# Patient Record
Sex: FEMALE | Race: WHITE | Employment: OTHER | URBAN - METROPOLITAN AREA
[De-identification: names, ages, dates, MRNs, and addresses within clinical notes are randomized per-mention and may not be internally consistent; named-entity substitution may affect disease eponyms.]

---

## 2018-07-18 ENCOUNTER — TELEPHONE (OUTPATIENT)
Dept: NON INVASIVE DIAGNOSTICS | Age: 83
End: 2018-07-18

## 2018-08-14 NOTE — TELEPHONE ENCOUNTER
Patient lives in [place] now and per her Rose Omer, she is getting device follow up in [place] and will not be coming back to this office.

## 2018-09-25 ENCOUNTER — HOSPITAL ENCOUNTER (INPATIENT)
Age: 83
LOS: 14 days | Discharge: NURSING, REHAB OR LONG-TERM CARE | DRG: 330 | End: 2018-10-09
Attending: EMERGENCY MEDICINE | Admitting: HOSPITALIST
Payer: MEDICARE

## 2018-09-25 ENCOUNTER — APPOINTMENT (OUTPATIENT)
Dept: CT IMAGING | Age: 83
DRG: 330 | End: 2018-09-25
Payer: MEDICARE

## 2018-09-25 DIAGNOSIS — Z79.01 CHRONIC ANTICOAGULATION: ICD-10-CM

## 2018-09-25 DIAGNOSIS — K92.2 GASTROINTESTINAL HEMORRHAGE, UNSPECIFIED GASTROINTESTINAL HEMORRHAGE TYPE: ICD-10-CM

## 2018-09-25 DIAGNOSIS — K63.89 COLONIC MASS: ICD-10-CM

## 2018-09-25 DIAGNOSIS — R10.84 GENERALIZED ABDOMINAL PAIN: ICD-10-CM

## 2018-09-25 DIAGNOSIS — R07.1 CHEST PAIN ON BREATHING: ICD-10-CM

## 2018-09-25 DIAGNOSIS — R79.1 SUPRATHERAPEUTIC INR: ICD-10-CM

## 2018-09-25 DIAGNOSIS — K50.011 TERMINAL ILEITIS WITH RECTAL BLEEDING (HCC): Primary | ICD-10-CM

## 2018-09-25 DIAGNOSIS — Z86.79 HISTORY OF ATRIAL FIBRILLATION: ICD-10-CM

## 2018-09-25 LAB
ALBUMIN SERPL-MCNC: 3.8 G/DL (ref 3.5–5.2)
ALP BLD-CCNC: 96 U/L (ref 35–104)
ALT SERPL-CCNC: 12 U/L (ref 0–32)
ANION GAP SERPL CALCULATED.3IONS-SCNC: 15 MMOL/L (ref 7–16)
AST SERPL-CCNC: 17 U/L (ref 0–31)
BASOPHILS ABSOLUTE: 0.04 E9/L (ref 0–0.2)
BASOPHILS RELATIVE PERCENT: 0.6 % (ref 0–2)
BILIRUB SERPL-MCNC: 0.4 MG/DL (ref 0–1.2)
BUN BLDV-MCNC: 25 MG/DL (ref 8–23)
CALCIUM SERPL-MCNC: 9.3 MG/DL (ref 8.6–10.2)
CHLORIDE BLD-SCNC: 104 MMOL/L (ref 98–107)
CO2: 20 MMOL/L (ref 22–29)
CREAT SERPL-MCNC: 0.8 MG/DL (ref 0.5–1)
EKG ATRIAL RATE: 144 BPM
EKG Q-T INTERVAL: 372 MS
EKG QRS DURATION: 98 MS
EKG QTC CALCULATION (BAZETT): 429 MS
EKG R AXIS: 130 DEGREES
EKG T AXIS: 17 DEGREES
EKG VENTRICULAR RATE: 80 BPM
EOSINOPHILS ABSOLUTE: 0 E9/L (ref 0.05–0.5)
EOSINOPHILS RELATIVE PERCENT: 0 % (ref 0–6)
GFR AFRICAN AMERICAN: >60
GFR NON-AFRICAN AMERICAN: >60 ML/MIN/1.73
GLUCOSE BLD-MCNC: 160 MG/DL (ref 74–109)
HCT VFR BLD CALC: 33.4 % (ref 34–48)
HEMOGLOBIN: 10.5 G/DL (ref 11.5–15.5)
IMMATURE GRANULOCYTES #: 0.02 E9/L
IMMATURE GRANULOCYTES %: 0.3 % (ref 0–5)
INR BLD: 5.3
LACTIC ACID: 1.2 MMOL/L (ref 0.5–2.2)
LIPASE: 11 U/L (ref 13–60)
LYMPHOCYTES ABSOLUTE: 1.09 E9/L (ref 1.5–4)
LYMPHOCYTES RELATIVE PERCENT: 15.9 % (ref 20–42)
MCH RBC QN AUTO: 30.3 PG (ref 26–35)
MCHC RBC AUTO-ENTMCNC: 31.4 % (ref 32–34.5)
MCV RBC AUTO: 96.5 FL (ref 80–99.9)
MONOCYTES ABSOLUTE: 1.3 E9/L (ref 0.1–0.95)
MONOCYTES RELATIVE PERCENT: 19 % (ref 2–12)
NEUTROPHILS ABSOLUTE: 4.4 E9/L (ref 1.8–7.3)
NEUTROPHILS RELATIVE PERCENT: 64.2 % (ref 43–80)
PDW BLD-RTO: 15 FL (ref 11.5–15)
PLATELET # BLD: 249 E9/L (ref 130–450)
PMV BLD AUTO: 10.2 FL (ref 7–12)
POTASSIUM SERPL-SCNC: 4.3 MMOL/L (ref 3.5–5)
PROTHROMBIN TIME: 59.6 SEC (ref 9.3–12.4)
RBC # BLD: 3.46 E12/L (ref 3.5–5.5)
SODIUM BLD-SCNC: 139 MMOL/L (ref 132–146)
TOTAL PROTEIN: 7.3 G/DL (ref 6.4–8.3)
WBC # BLD: 6.9 E9/L (ref 4.5–11.5)

## 2018-09-25 PROCEDURE — 6370000000 HC RX 637 (ALT 250 FOR IP): Performed by: EMERGENCY MEDICINE

## 2018-09-25 PROCEDURE — 80053 COMPREHEN METABOLIC PANEL: CPT

## 2018-09-25 PROCEDURE — 74176 CT ABD & PELVIS W/O CONTRAST: CPT

## 2018-09-25 PROCEDURE — 96374 THER/PROPH/DIAG INJ IV PUSH: CPT

## 2018-09-25 PROCEDURE — 2580000003 HC RX 258: Performed by: EMERGENCY MEDICINE

## 2018-09-25 PROCEDURE — 99285 EMERGENCY DEPT VISIT HI MDM: CPT

## 2018-09-25 PROCEDURE — 6360000002 HC RX W HCPCS

## 2018-09-25 PROCEDURE — 96375 TX/PRO/DX INJ NEW DRUG ADDON: CPT

## 2018-09-25 PROCEDURE — 83690 ASSAY OF LIPASE: CPT

## 2018-09-25 PROCEDURE — 85610 PROTHROMBIN TIME: CPT

## 2018-09-25 PROCEDURE — 85025 COMPLETE CBC W/AUTO DIFF WBC: CPT

## 2018-09-25 PROCEDURE — 2060000000 HC ICU INTERMEDIATE R&B

## 2018-09-25 PROCEDURE — 83605 ASSAY OF LACTIC ACID: CPT

## 2018-09-25 PROCEDURE — 6370000000 HC RX 637 (ALT 250 FOR IP): Performed by: HOSPITALIST

## 2018-09-25 PROCEDURE — 2580000003 HC RX 258: Performed by: HOSPITALIST

## 2018-09-25 PROCEDURE — 93005 ELECTROCARDIOGRAM TRACING: CPT | Performed by: EMERGENCY MEDICINE

## 2018-09-25 PROCEDURE — 6360000002 HC RX W HCPCS: Performed by: EMERGENCY MEDICINE

## 2018-09-25 RX ORDER — ACETAMINOPHEN 325 MG/1
650 TABLET ORAL EVERY 4 HOURS PRN
Status: DISCONTINUED | OUTPATIENT
Start: 2018-09-25 | End: 2018-10-09 | Stop reason: HOSPADM

## 2018-09-25 RX ORDER — SODIUM CHLORIDE 0.9 % (FLUSH) 0.9 %
10 SYRINGE (ML) INJECTION EVERY 12 HOURS SCHEDULED
Status: DISCONTINUED | OUTPATIENT
Start: 2018-09-25 | End: 2018-09-27 | Stop reason: SDUPTHER

## 2018-09-25 RX ORDER — ONDANSETRON 2 MG/ML
INJECTION INTRAMUSCULAR; INTRAVENOUS
Status: COMPLETED
Start: 2018-09-25 | End: 2018-09-25

## 2018-09-25 RX ORDER — M-VIT,TX,IRON,MINS/CALC/FOLIC 27MG-0.4MG
1 TABLET ORAL DAILY
Status: ON HOLD | COMMUNITY

## 2018-09-25 RX ORDER — TRAZODONE HYDROCHLORIDE 100 MG/1
100 TABLET ORAL NIGHTLY
COMMUNITY
End: 2022-11-02 | Stop reason: ALTCHOICE

## 2018-09-25 RX ORDER — ACETAMINOPHEN 160 MG
1 TABLET,DISINTEGRATING ORAL NIGHTLY
Status: ON HOLD | COMMUNITY
End: 2018-10-09 | Stop reason: HOSPADM

## 2018-09-25 RX ORDER — SODIUM CHLORIDE 0.9 % (FLUSH) 0.9 %
10 SYRINGE (ML) INJECTION PRN
Status: DISCONTINUED | OUTPATIENT
Start: 2018-09-25 | End: 2018-09-27 | Stop reason: SDUPTHER

## 2018-09-25 RX ORDER — MORPHINE SULFATE 15 MG/1
15 TABLET ORAL 2 TIMES DAILY
Status: ON HOLD | COMMUNITY
End: 2018-10-09

## 2018-09-25 RX ORDER — ACETAMINOPHEN 325 MG/1
650 TABLET ORAL ONCE
Status: COMPLETED | OUTPATIENT
Start: 2018-09-25 | End: 2018-09-25

## 2018-09-25 RX ORDER — ONDANSETRON 2 MG/ML
4 INJECTION INTRAMUSCULAR; INTRAVENOUS ONCE
Status: COMPLETED | OUTPATIENT
Start: 2018-09-25 | End: 2018-09-25

## 2018-09-25 RX ORDER — HYDROCODONE BITARTRATE AND ACETAMINOPHEN 10; 325 MG/1; MG/1
1 TABLET ORAL EVERY 6 HOURS PRN
Status: ON HOLD | COMMUNITY
End: 2018-10-09

## 2018-09-25 RX ORDER — 0.9 % SODIUM CHLORIDE 0.9 %
500 INTRAVENOUS SOLUTION INTRAVENOUS ONCE
Status: COMPLETED | OUTPATIENT
Start: 2018-09-25 | End: 2018-09-25

## 2018-09-25 RX ORDER — BIOTIN 1 MG
1 TABLET ORAL NIGHTLY
Status: ON HOLD | COMMUNITY
End: 2018-10-09 | Stop reason: HOSPADM

## 2018-09-25 RX ORDER — KETOROLAC TROMETHAMINE 30 MG/ML
15 INJECTION, SOLUTION INTRAMUSCULAR; INTRAVENOUS ONCE
Status: DISCONTINUED | OUTPATIENT
Start: 2018-09-25 | End: 2018-09-25

## 2018-09-25 RX ORDER — MORPHINE SULFATE 2 MG/ML
4 INJECTION, SOLUTION INTRAMUSCULAR; INTRAVENOUS ONCE
Status: COMPLETED | OUTPATIENT
Start: 2018-09-25 | End: 2018-09-25

## 2018-09-25 RX ADMIN — ACETAMINOPHEN 650 MG: 325 TABLET ORAL at 19:21

## 2018-09-25 RX ADMIN — MORPHINE SULFATE 4 MG: 2 INJECTION, SOLUTION INTRAMUSCULAR; INTRAVENOUS at 17:37

## 2018-09-25 RX ADMIN — ONDANSETRON 4 MG: 2 INJECTION INTRAMUSCULAR; INTRAVENOUS at 17:39

## 2018-09-25 RX ADMIN — ONDANSETRON HYDROCHLORIDE 4 MG: 2 INJECTION, SOLUTION INTRAMUSCULAR; INTRAVENOUS at 17:39

## 2018-09-25 RX ADMIN — Medication 10 ML: at 22:00

## 2018-09-25 RX ADMIN — SODIUM CHLORIDE 500 ML: 9 INJECTION, SOLUTION INTRAVENOUS at 15:56

## 2018-09-25 RX ADMIN — ACETAMINOPHEN 650 MG: 325 TABLET ORAL at 22:42

## 2018-09-25 ASSESSMENT — ENCOUNTER SYMPTOMS
VOMITING: 0
CONSTIPATION: 0
NAUSEA: 1
HEMATEMESIS: 0
SHORTNESS OF BREATH: 0
DIARRHEA: 1
ABDOMINAL PAIN: 1
HEMATOCHEZIA: 1
ANAL BLEEDING: 1
COUGH: 0
BLOOD IN STOOL: 0
BACK PAIN: 0

## 2018-09-25 ASSESSMENT — PAIN DESCRIPTION - DESCRIPTORS
DESCRIPTORS: ACHING;DISCOMFORT
DESCRIPTORS: CRAMPING;DISCOMFORT;SORE

## 2018-09-25 ASSESSMENT — PAIN SCALES - GENERAL
PAINLEVEL_OUTOF10: 7
PAINLEVEL_OUTOF10: 10
PAINLEVEL_OUTOF10: 10
PAINLEVEL_OUTOF10: 2
PAINLEVEL_OUTOF10: 9
PAINLEVEL_OUTOF10: 9
PAINLEVEL_OUTOF10: 7
PAINLEVEL_OUTOF10: 1

## 2018-09-25 ASSESSMENT — PAIN DESCRIPTION - FREQUENCY: FREQUENCY: CONTINUOUS

## 2018-09-25 ASSESSMENT — PAIN DESCRIPTION - ORIENTATION
ORIENTATION: RIGHT;LEFT
ORIENTATION: RIGHT;LEFT;MID

## 2018-09-25 ASSESSMENT — PAIN DESCRIPTION - PROGRESSION
CLINICAL_PROGRESSION: NOT CHANGED
CLINICAL_PROGRESSION: RAPIDLY IMPROVING

## 2018-09-25 ASSESSMENT — PAIN DESCRIPTION - LOCATION
LOCATION: ABDOMEN

## 2018-09-25 ASSESSMENT — PAIN DESCRIPTION - PAIN TYPE
TYPE: ACUTE PAIN

## 2018-09-25 ASSESSMENT — PAIN DESCRIPTION - ONSET: ONSET: ON-GOING

## 2018-09-25 NOTE — ED PROVIDER NOTES
70-year-old female from [place] with history of A fibrillation and pacemaker on Coumadin presents to the ED with chief complaint of abdominal pain and bloody stool. Patient has chronic abdominal pain. She was visiting her daughter who lives in [facility]. Daughters state that her pain is much worse than usual and she has had blood in the stool since yesterday. States she is nauseous as well. Abdominal Pain   Pain location:  Generalized  Pain quality: aching and cramping    Pain radiates to:  Does not radiate  Pain severity:  Moderate  Onset quality:  Unable to specify  Timing:  Constant  Progression:  Worsening  Chronicity:  Recurrent  Context: not recent illness, not recent travel, not sick contacts and not trauma    Relieved by:  Nothing  Worsened by:  Palpation  Ineffective treatments: Heating pad, morphine. Associated symptoms: anorexia, diarrhea, hematochezia and nausea    Associated symptoms: no chest pain, no chills, no constipation, no cough, no dysuria, no fatigue, no fever, no hematemesis, no hematuria, no melena, no shortness of breath, no vaginal bleeding and no vomiting    Risk factors: being elderly        Review of Systems   Constitutional: Negative for chills, fatigue and fever. Respiratory: Negative for cough and shortness of breath. Cardiovascular: Negative for chest pain. Gastrointestinal: Positive for abdominal pain, anal bleeding, anorexia, diarrhea, hematochezia and nausea. Negative for blood in stool, constipation, hematemesis, melena and vomiting. Genitourinary: Negative for dysuria, frequency, hematuria and vaginal bleeding. Musculoskeletal: Negative for back pain. Skin: Negative for rash. Neurological: Negative for dizziness, weakness, light-headedness, numbness and headaches. All other systems reviewed and are negative. Physical Exam   Constitutional: She is oriented to person, place, and time. She appears well-developed and well-nourished.    HENT:   Head: Dop Color Flow (5/23/2012); Coronary artery bypass graft; Coronary angioplasty with stent (2009); Colonoscopy; Tonsillectomy; Appendectomy; Hysterectomy; Endoscopy, colon, diagnostic; Cardioversion (06/28/2012); Cardiac catheterization (03/13/2014); Breast surgery; eye surgery; and pacemaker placement. Social History:  reports that she has never smoked. She has never used smokeless tobacco. She reports that she does not drink alcohol or use drugs. Family History: family history includes Cancer in her sister; Heart Disease in her brother, father, and mother; Stroke in her brother and father. The patients home medications have been reviewed. Allergies: Sulfa antibiotics; Adhesive tape;  Bactrim; and Pcn [penicillins]    -------------------------------------------------- RESULTS -------------------------------------------------    LABS:  Results for orders placed or performed during the hospital encounter of 09/25/18   Comprehensive Metabolic Panel   Result Value Ref Range    Sodium 139 132 - 146 mmol/L    Potassium 4.3 3.5 - 5.0 mmol/L    Chloride 104 98 - 107 mmol/L    CO2 20 (L) 22 - 29 mmol/L    Anion Gap 15 7 - 16 mmol/L    Glucose 160 (H) 74 - 109 mg/dL    BUN 25 (H) 8 - 23 mg/dL    CREATININE 0.8 0.5 - 1.0 mg/dL    GFR Non-African American >60 >=60 mL/min/1.73    GFR African American >60     Calcium 9.3 8.6 - 10.2 mg/dL    Total Protein 7.3 6.4 - 8.3 g/dL    Alb 3.8 3.5 - 5.2 g/dL    Total Bilirubin 0.4 0.0 - 1.2 mg/dL    Alkaline Phosphatase 96 35 - 104 U/L    ALT 12 0 - 32 U/L    AST 17 0 - 31 U/L   CBC Auto Differential   Result Value Ref Range    WBC 6.9 4.5 - 11.5 E9/L    RBC 3.46 (L) 3.50 - 5.50 E12/L    Hemoglobin 10.5 (L) 11.5 - 15.5 g/dL    Hematocrit 33.4 (L) 34.0 - 48.0 %    MCV 96.5 80.0 - 99.9 fL    MCH 30.3 26.0 - 35.0 pg    MCHC 31.4 (L) 32.0 - 34.5 %    RDW 15.0 11.5 - 15.0 fL    Platelets 277 276 - 364 E9/L    MPV 10.2 7.0 - 12.0 fL    Neutrophils % 64.2 43.0 - 80.0 %    Immature

## 2018-09-26 PROBLEM — R11.2 NAUSEA AND VOMITING: Status: ACTIVE | Noted: 2018-09-26

## 2018-09-26 PROBLEM — I50.32 CHRONIC DIASTOLIC CHF (CONGESTIVE HEART FAILURE) (HCC): Status: ACTIVE | Noted: 2018-09-26

## 2018-09-26 PROBLEM — E03.9 HYPOTHYROIDISM: Chronic | Status: ACTIVE | Noted: 2018-09-26

## 2018-09-26 PROBLEM — R79.1 SUPRATHERAPEUTIC INR: Status: ACTIVE | Noted: 2018-09-26

## 2018-09-26 PROBLEM — E78.5 HYPERLIPIDEMIA: Chronic | Status: ACTIVE | Noted: 2018-09-26

## 2018-09-26 PROBLEM — R09.89 ABNORMAL CHEST SOUNDS: Status: ACTIVE | Noted: 2018-09-26

## 2018-09-26 PROBLEM — N18.9 CKD (CHRONIC KIDNEY DISEASE): Chronic | Status: ACTIVE | Noted: 2018-09-26

## 2018-09-26 PROBLEM — I10 ESSENTIAL HYPERTENSION: Chronic | Status: ACTIVE | Noted: 2018-09-26

## 2018-09-26 PROBLEM — K62.5 BRBPR (BRIGHT RED BLOOD PER RECTUM): Status: ACTIVE | Noted: 2018-09-26

## 2018-09-26 PROBLEM — R19.7 DIARRHEA: Status: ACTIVE | Noted: 2018-09-26

## 2018-09-26 LAB
ABO/RH: NORMAL
ANION GAP SERPL CALCULATED.3IONS-SCNC: 14 MMOL/L (ref 7–16)
ANISOCYTOSIS: ABNORMAL
ANTIBODY SCREEN: NORMAL
BACTERIA: ABNORMAL /HPF
BASOPHILS ABSOLUTE: 0 E9/L (ref 0–0.2)
BASOPHILS RELATIVE PERCENT: 0 % (ref 0–2)
BILIRUBIN URINE: NEGATIVE
BLOOD, URINE: ABNORMAL
BUN BLDV-MCNC: 40 MG/DL (ref 8–23)
BURR CELLS: ABNORMAL
C-REACTIVE PROTEIN: 5.2 MG/DL (ref 0–0.4)
CALCIUM SERPL-MCNC: 8.6 MG/DL (ref 8.6–10.2)
CHLORIDE BLD-SCNC: 107 MMOL/L (ref 98–107)
CLARITY: CLEAR
CO2: 20 MMOL/L (ref 22–29)
COLOR: YELLOW
CREAT SERPL-MCNC: 0.9 MG/DL (ref 0.5–1)
EOSINOPHILS ABSOLUTE: 0 E9/L (ref 0.05–0.5)
EOSINOPHILS RELATIVE PERCENT: 0 % (ref 0–6)
GFR AFRICAN AMERICAN: >60
GFR NON-AFRICAN AMERICAN: 59 ML/MIN/1.73
GLUCOSE BLD-MCNC: 122 MG/DL (ref 74–109)
GLUCOSE URINE: NEGATIVE MG/DL
HCT VFR BLD CALC: 25.3 % (ref 34–48)
HCT VFR BLD CALC: 29.4 % (ref 34–48)
HCT VFR BLD CALC: 31.1 % (ref 34–48)
HEMOGLOBIN: 7.7 G/DL (ref 11.5–15.5)
HEMOGLOBIN: 8.8 G/DL (ref 11.5–15.5)
HEMOGLOBIN: 9.3 G/DL (ref 11.5–15.5)
INR BLD: 3.7
INR BLD: 9.2
KETONES, URINE: NEGATIVE MG/DL
LEUKOCYTE ESTERASE, URINE: ABNORMAL
LYMPHOCYTES ABSOLUTE: 0.78 E9/L (ref 1.5–4)
LYMPHOCYTES RELATIVE PERCENT: 8 % (ref 20–42)
MCH RBC QN AUTO: 29.6 PG (ref 26–35)
MCHC RBC AUTO-ENTMCNC: 29.9 % (ref 32–34.5)
MCV RBC AUTO: 99 FL (ref 80–99.9)
METAMYELOCYTES RELATIVE PERCENT: 1 % (ref 0–1)
MONOCYTES ABSOLUTE: 0.97 E9/L (ref 0.1–0.95)
MONOCYTES RELATIVE PERCENT: 10 % (ref 2–12)
NEUTROPHILS ABSOLUTE: 7.95 E9/L (ref 1.8–7.3)
NEUTROPHILS RELATIVE PERCENT: 81 % (ref 43–80)
NITRITE, URINE: POSITIVE
NUCLEATED RED BLOOD CELLS: 0 /100 WBC
PDW BLD-RTO: 15.3 FL (ref 11.5–15)
PH UA: 6 (ref 5–9)
PLATELET # BLD: 227 E9/L (ref 130–450)
PMV BLD AUTO: 9.7 FL (ref 7–12)
POIKILOCYTES: ABNORMAL
POLYCHROMASIA: ABNORMAL
POTASSIUM SERPL-SCNC: 3.6 MMOL/L (ref 3.5–5)
PROCALCITONIN: 0.1 NG/ML (ref 0–0.08)
PROTEIN UA: NEGATIVE MG/DL
PROTHROMBIN TIME: 41.8 SEC (ref 9.3–12.4)
PROTHROMBIN TIME: 99.6 SEC (ref 9.3–12.4)
RBC # BLD: 3.14 E12/L (ref 3.5–5.5)
RBC UA: ABNORMAL /HPF (ref 0–2)
SEDIMENTATION RATE, ERYTHROCYTE: 39 MM/HR (ref 0–20)
SODIUM BLD-SCNC: 141 MMOL/L (ref 132–146)
SPECIFIC GRAVITY UA: 1.02 (ref 1–1.03)
SPHEROCYTES: ABNORMAL
TEAR DROP CELLS: ABNORMAL
UROBILINOGEN, URINE: 0.2 E.U./DL
WBC # BLD: 9.7 E9/L (ref 4.5–11.5)
WBC UA: ABNORMAL /HPF (ref 0–5)

## 2018-09-26 PROCEDURE — 86850 RBC ANTIBODY SCREEN: CPT

## 2018-09-26 PROCEDURE — 84145 PROCALCITONIN (PCT): CPT

## 2018-09-26 PROCEDURE — 2500000003 HC RX 250 WO HCPCS: Performed by: INTERNAL MEDICINE

## 2018-09-26 PROCEDURE — 87450 HC DIRECT STREP B ANTIGEN: CPT

## 2018-09-26 PROCEDURE — 87088 URINE BACTERIA CULTURE: CPT

## 2018-09-26 PROCEDURE — 85018 HEMOGLOBIN: CPT

## 2018-09-26 PROCEDURE — 94640 AIRWAY INHALATION TREATMENT: CPT

## 2018-09-26 PROCEDURE — 6360000002 HC RX W HCPCS: Performed by: INTERNAL MEDICINE

## 2018-09-26 PROCEDURE — 86901 BLOOD TYPING SEROLOGIC RH(D): CPT

## 2018-09-26 PROCEDURE — 85651 RBC SED RATE NONAUTOMATED: CPT

## 2018-09-26 PROCEDURE — 87324 CLOSTRIDIUM AG IA: CPT

## 2018-09-26 PROCEDURE — 2580000003 HC RX 258: Performed by: INTERNAL MEDICINE

## 2018-09-26 PROCEDURE — 81001 URINALYSIS AUTO W/SCOPE: CPT

## 2018-09-26 PROCEDURE — 94664 DEMO&/EVAL PT USE INHALER: CPT

## 2018-09-26 PROCEDURE — 36415 COLL VENOUS BLD VENIPUNCTURE: CPT

## 2018-09-26 PROCEDURE — 2060000000 HC ICU INTERMEDIATE R&B

## 2018-09-26 PROCEDURE — 85014 HEMATOCRIT: CPT

## 2018-09-26 PROCEDURE — 6370000000 HC RX 637 (ALT 250 FOR IP): Performed by: INTERNAL MEDICINE

## 2018-09-26 PROCEDURE — 85610 PROTHROMBIN TIME: CPT

## 2018-09-26 PROCEDURE — 80048 BASIC METABOLIC PNL TOTAL CA: CPT

## 2018-09-26 PROCEDURE — 86140 C-REACTIVE PROTEIN: CPT

## 2018-09-26 PROCEDURE — P9059 PLASMA, FRZ BETWEEN 8-24HOUR: HCPCS

## 2018-09-26 PROCEDURE — 99223 1ST HOSP IP/OBS HIGH 75: CPT | Performed by: INTERNAL MEDICINE

## 2018-09-26 PROCEDURE — 86900 BLOOD TYPING SEROLOGIC ABO: CPT

## 2018-09-26 PROCEDURE — 85025 COMPLETE CBC W/AUTO DIFF WBC: CPT

## 2018-09-26 RX ORDER — METHYLPREDNISOLONE SODIUM SUCCINATE 40 MG/ML
20 INJECTION, POWDER, LYOPHILIZED, FOR SOLUTION INTRAMUSCULAR; INTRAVENOUS EVERY 12 HOURS
Status: DISCONTINUED | OUTPATIENT
Start: 2018-09-26 | End: 2018-10-03

## 2018-09-26 RX ORDER — SODIUM CHLORIDE 0.9 % (FLUSH) 0.9 %
10 SYRINGE (ML) INJECTION PRN
Status: DISCONTINUED | OUTPATIENT
Start: 2018-09-26 | End: 2018-09-30 | Stop reason: SDUPTHER

## 2018-09-26 RX ORDER — PRAVASTATIN SODIUM 20 MG
80 TABLET ORAL DAILY
Status: DISCONTINUED | OUTPATIENT
Start: 2018-09-26 | End: 2018-10-09

## 2018-09-26 RX ORDER — 0.9 % SODIUM CHLORIDE 0.9 %
250 INTRAVENOUS SOLUTION INTRAVENOUS ONCE
Status: COMPLETED | OUTPATIENT
Start: 2018-09-26 | End: 2018-09-27

## 2018-09-26 RX ORDER — LEVOTHYROXINE SODIUM 0.03 MG/1
25 TABLET ORAL DAILY
Status: DISCONTINUED | OUTPATIENT
Start: 2018-09-26 | End: 2018-10-09 | Stop reason: HOSPADM

## 2018-09-26 RX ORDER — METOPROLOL TARTRATE 50 MG/1
50 TABLET, FILM COATED ORAL 2 TIMES DAILY
Status: DISCONTINUED | OUTPATIENT
Start: 2018-09-26 | End: 2018-10-09

## 2018-09-26 RX ORDER — ORPHENADRINE CITRATE 30 MG/ML
60 INJECTION INTRAMUSCULAR; INTRAVENOUS EVERY 12 HOURS
Status: DISCONTINUED | OUTPATIENT
Start: 2018-09-26 | End: 2018-09-26

## 2018-09-26 RX ORDER — ISOSORBIDE MONONITRATE 60 MG/1
60 TABLET, EXTENDED RELEASE ORAL DAILY
Status: DISCONTINUED | OUTPATIENT
Start: 2018-09-26 | End: 2018-10-09 | Stop reason: HOSPADM

## 2018-09-26 RX ORDER — FUROSEMIDE 20 MG/1
20 TABLET ORAL 3 TIMES DAILY
Status: DISCONTINUED | OUTPATIENT
Start: 2018-09-26 | End: 2018-10-08

## 2018-09-26 RX ORDER — CYCLOBENZAPRINE HCL 10 MG
5 TABLET ORAL EVERY 8 HOURS
Status: DISCONTINUED | OUTPATIENT
Start: 2018-09-26 | End: 2018-10-09 | Stop reason: HOSPADM

## 2018-09-26 RX ORDER — MORPHINE SULFATE 15 MG/1
15 TABLET ORAL 2 TIMES DAILY
Status: DISCONTINUED | OUTPATIENT
Start: 2018-09-26 | End: 2018-09-26 | Stop reason: ALTCHOICE

## 2018-09-26 RX ORDER — POTASSIUM CHLORIDE 20 MEQ/1
20 TABLET, EXTENDED RELEASE ORAL 2 TIMES DAILY
Status: DISCONTINUED | OUTPATIENT
Start: 2018-09-26 | End: 2018-10-09

## 2018-09-26 RX ORDER — MORPHINE SULFATE 2 MG/ML
2 INJECTION, SOLUTION INTRAMUSCULAR; INTRAVENOUS
Status: DISCONTINUED | OUTPATIENT
Start: 2018-09-26 | End: 2018-09-27

## 2018-09-26 RX ORDER — MORPHINE SULFATE 2 MG/ML
1 INJECTION, SOLUTION INTRAMUSCULAR; INTRAVENOUS
Status: DISCONTINUED | OUTPATIENT
Start: 2018-09-26 | End: 2018-09-27

## 2018-09-26 RX ORDER — FAMOTIDINE 20 MG/1
20 TABLET, FILM COATED ORAL DAILY
Status: DISCONTINUED | OUTPATIENT
Start: 2018-09-26 | End: 2018-10-09 | Stop reason: HOSPADM

## 2018-09-26 RX ORDER — ONDANSETRON 2 MG/ML
4 INJECTION INTRAMUSCULAR; INTRAVENOUS EVERY 6 HOURS PRN
Status: DISCONTINUED | OUTPATIENT
Start: 2018-09-26 | End: 2018-10-09 | Stop reason: HOSPADM

## 2018-09-26 RX ORDER — FAMOTIDINE 20 MG/1
20 TABLET, FILM COATED ORAL 2 TIMES DAILY
Status: DISCONTINUED | OUTPATIENT
Start: 2018-09-26 | End: 2018-09-26 | Stop reason: DRUGHIGH

## 2018-09-26 RX ORDER — LEVOFLOXACIN 5 MG/ML
750 INJECTION, SOLUTION INTRAVENOUS
Status: DISCONTINUED | OUTPATIENT
Start: 2018-09-26 | End: 2018-09-26

## 2018-09-26 RX ORDER — TRAZODONE HYDROCHLORIDE 50 MG/1
100 TABLET ORAL NIGHTLY
Status: DISCONTINUED | OUTPATIENT
Start: 2018-09-26 | End: 2018-10-09 | Stop reason: HOSPADM

## 2018-09-26 RX ORDER — M-VIT,TX,IRON,MINS/CALC/FOLIC 27MG-0.4MG
1 TABLET ORAL
Status: DISCONTINUED | OUTPATIENT
Start: 2018-09-26 | End: 2018-10-09 | Stop reason: HOSPADM

## 2018-09-26 RX ORDER — SODIUM CHLORIDE 9 MG/ML
INJECTION, SOLUTION INTRAVENOUS CONTINUOUS
Status: ACTIVE | OUTPATIENT
Start: 2018-09-26 | End: 2018-09-27

## 2018-09-26 RX ORDER — SODIUM CHLORIDE 0.9 % (FLUSH) 0.9 %
10 SYRINGE (ML) INJECTION EVERY 12 HOURS SCHEDULED
Status: DISCONTINUED | OUTPATIENT
Start: 2018-09-26 | End: 2018-09-30 | Stop reason: SDUPTHER

## 2018-09-26 RX ADMIN — METOPROLOL TARTRATE 50 MG: 50 TABLET ORAL at 20:51

## 2018-09-26 RX ADMIN — POTASSIUM CHLORIDE 20 MEQ: 20 TABLET, EXTENDED RELEASE ORAL at 20:50

## 2018-09-26 RX ADMIN — MORPHINE SULFATE 2 MG: 2 INJECTION, SOLUTION INTRAMUSCULAR; INTRAVENOUS at 05:57

## 2018-09-26 RX ADMIN — FUROSEMIDE 20 MG: 20 TABLET ORAL at 20:51

## 2018-09-26 RX ADMIN — CYCLOBENZAPRINE HYDROCHLORIDE 5 MG: 10 TABLET, FILM COATED ORAL at 06:59

## 2018-09-26 RX ADMIN — CYCLOBENZAPRINE HYDROCHLORIDE 5 MG: 10 TABLET, FILM COATED ORAL at 16:29

## 2018-09-26 RX ADMIN — FUROSEMIDE 20 MG: 20 TABLET ORAL at 09:16

## 2018-09-26 RX ADMIN — LEVOTHYROXINE SODIUM 25 MCG: 25 TABLET ORAL at 05:58

## 2018-09-26 RX ADMIN — SODIUM CHLORIDE 250 ML: 9 INJECTION, SOLUTION INTRAVENOUS at 17:02

## 2018-09-26 RX ADMIN — MORPHINE SULFATE 2 MG: 2 INJECTION, SOLUTION INTRAMUSCULAR; INTRAVENOUS at 02:48

## 2018-09-26 RX ADMIN — SODIUM CHLORIDE: 9 INJECTION, SOLUTION INTRAVENOUS at 06:11

## 2018-09-26 RX ADMIN — ONDANSETRON HYDROCHLORIDE 4 MG: 2 INJECTION, SOLUTION INTRAMUSCULAR; INTRAVENOUS at 02:48

## 2018-09-26 RX ADMIN — FUROSEMIDE 20 MG: 20 TABLET ORAL at 16:28

## 2018-09-26 RX ADMIN — TRAZODONE HYDROCHLORIDE 100 MG: 50 TABLET ORAL at 20:51

## 2018-09-26 RX ADMIN — ONDANSETRON HYDROCHLORIDE 4 MG: 2 INJECTION, SOLUTION INTRAMUSCULAR; INTRAVENOUS at 05:57

## 2018-09-26 RX ADMIN — METRONIDAZOLE 500 MG: 500 INJECTION, SOLUTION INTRAVENOUS at 06:19

## 2018-09-26 RX ADMIN — METOPROLOL TARTRATE 50 MG: 50 TABLET ORAL at 09:16

## 2018-09-26 RX ADMIN — ISOSORBIDE MONONITRATE 60 MG: 60 TABLET, EXTENDED RELEASE ORAL at 09:16

## 2018-09-26 RX ADMIN — BECLOMETHASONE DIPROPIONATE HFA 1 PUFF: 40 AEROSOL, METERED RESPIRATORY (INHALATION) at 11:47

## 2018-09-26 RX ADMIN — POTASSIUM CHLORIDE 20 MEQ: 20 TABLET, EXTENDED RELEASE ORAL at 09:16

## 2018-09-26 RX ADMIN — PRAVASTATIN SODIUM 80 MG: 20 TABLET ORAL at 09:16

## 2018-09-26 RX ADMIN — MULTIPLE VITAMINS W/ MINERALS TAB 1 TABLET: TAB at 16:28

## 2018-09-26 RX ADMIN — CYCLOBENZAPRINE HYDROCHLORIDE 5 MG: 10 TABLET, FILM COATED ORAL at 23:55

## 2018-09-26 RX ADMIN — METHYLPREDNISOLONE SODIUM SUCCINATE 20 MG: 40 INJECTION, POWDER, LYOPHILIZED, FOR SOLUTION INTRAMUSCULAR; INTRAVENOUS at 16:29

## 2018-09-26 RX ADMIN — LEVOFLOXACIN 750 MG: 5 INJECTION, SOLUTION INTRAVENOUS at 07:30

## 2018-09-26 RX ADMIN — BECLOMETHASONE DIPROPIONATE HFA 1 PUFF: 40 AEROSOL, METERED RESPIRATORY (INHALATION) at 19:52

## 2018-09-26 RX ADMIN — FAMOTIDINE 20 MG: 20 TABLET ORAL at 09:16

## 2018-09-26 ASSESSMENT — PAIN SCALES - GENERAL
PAINLEVEL_OUTOF10: 5
PAINLEVEL_OUTOF10: 0
PAINLEVEL_OUTOF10: 3
PAINLEVEL_OUTOF10: 8
PAINLEVEL_OUTOF10: 0
PAINLEVEL_OUTOF10: 8

## 2018-09-26 NOTE — PROGRESS NOTES
Stool specimen collected and sent down at this time for C-diff.      Electronically signed by Mira Castle RN on 9/26/2018 at 11:30 AM

## 2018-09-27 ENCOUNTER — APPOINTMENT (OUTPATIENT)
Dept: GENERAL RADIOLOGY | Age: 83
DRG: 330 | End: 2018-09-27
Payer: MEDICARE

## 2018-09-27 LAB
ANION GAP SERPL CALCULATED.3IONS-SCNC: 14 MMOL/L (ref 7–16)
BASOPHILS ABSOLUTE: 0.01 E9/L (ref 0–0.2)
BASOPHILS RELATIVE PERCENT: 0.1 % (ref 0–2)
BLOOD BANK DISPENSE STATUS: NORMAL
BLOOD BANK PRODUCT CODE: NORMAL
BPU ID: NORMAL
BUN BLDV-MCNC: 32 MG/DL (ref 8–23)
C DIFFICILE TOXIN, EIA: NORMAL
C-REACTIVE PROTEIN: 4.9 MG/DL (ref 0–0.4)
CALCIUM SERPL-MCNC: 8.9 MG/DL (ref 8.6–10.2)
CHLORIDE BLD-SCNC: 107 MMOL/L (ref 98–107)
CO2: 20 MMOL/L (ref 22–29)
CREAT SERPL-MCNC: 0.9 MG/DL (ref 0.5–1)
DESCRIPTION BLOOD BANK: NORMAL
EOSINOPHILS ABSOLUTE: 0 E9/L (ref 0.05–0.5)
EOSINOPHILS RELATIVE PERCENT: 0 % (ref 0–6)
GFR AFRICAN AMERICAN: >60
GFR NON-AFRICAN AMERICAN: 59 ML/MIN/1.73
GLUCOSE BLD-MCNC: 136 MG/DL (ref 74–109)
HCT VFR BLD CALC: 29.1 % (ref 34–48)
HEMOGLOBIN: 8.7 G/DL (ref 11.5–15.5)
IMMATURE GRANULOCYTES #: 0.06 E9/L
IMMATURE GRANULOCYTES %: 0.7 % (ref 0–5)
INR BLD: 5.8
INR BLD: 5.9
L. PNEUMOPHILA SEROGP 1 UR AG: NORMAL
LYMPHOCYTES ABSOLUTE: 0.77 E9/L (ref 1.5–4)
LYMPHOCYTES RELATIVE PERCENT: 9 % (ref 20–42)
MCH RBC QN AUTO: 29.8 PG (ref 26–35)
MCHC RBC AUTO-ENTMCNC: 29.9 % (ref 32–34.5)
MCV RBC AUTO: 99.7 FL (ref 80–99.9)
MONOCYTES ABSOLUTE: 0.43 E9/L (ref 0.1–0.95)
MONOCYTES RELATIVE PERCENT: 5 % (ref 2–12)
NEUTROPHILS ABSOLUTE: 7.32 E9/L (ref 1.8–7.3)
NEUTROPHILS RELATIVE PERCENT: 85.2 % (ref 43–80)
PDW BLD-RTO: 15.4 FL (ref 11.5–15)
PLATELET # BLD: 213 E9/L (ref 130–450)
PMV BLD AUTO: 9.9 FL (ref 7–12)
POTASSIUM SERPL-SCNC: 3.5 MMOL/L (ref 3.5–5)
PROTHROMBIN TIME: 64.1 SEC (ref 9.3–12.4)
PROTHROMBIN TIME: 65.1 SEC (ref 9.3–12.4)
RBC # BLD: 2.92 E12/L (ref 3.5–5.5)
SEDIMENTATION RATE, ERYTHROCYTE: 30 MM/HR (ref 0–20)
SODIUM BLD-SCNC: 141 MMOL/L (ref 132–146)
STREP PNEUMONIAE ANTIGEN, URINE: NORMAL
URINE CULTURE, ROUTINE: NORMAL
WBC # BLD: 8.6 E9/L (ref 4.5–11.5)

## 2018-09-27 PROCEDURE — 6360000002 HC RX W HCPCS: Performed by: INTERNAL MEDICINE

## 2018-09-27 PROCEDURE — 94640 AIRWAY INHALATION TREATMENT: CPT

## 2018-09-27 PROCEDURE — P9059 PLASMA, FRZ BETWEEN 8-24HOUR: HCPCS

## 2018-09-27 PROCEDURE — 97535 SELF CARE MNGMENT TRAINING: CPT

## 2018-09-27 PROCEDURE — 2580000003 HC RX 258: Performed by: INTERNAL MEDICINE

## 2018-09-27 PROCEDURE — 6370000000 HC RX 637 (ALT 250 FOR IP): Performed by: INTERNAL MEDICINE

## 2018-09-27 PROCEDURE — 36415 COLL VENOUS BLD VENIPUNCTURE: CPT

## 2018-09-27 PROCEDURE — 99999 PR OFFICE/OUTPT VISIT,PROCEDURE ONLY: CPT | Performed by: INTERNAL MEDICINE

## 2018-09-27 PROCEDURE — G0328 FECAL BLOOD SCRN IMMUNOASSAY: HCPCS

## 2018-09-27 PROCEDURE — G8987 SELF CARE CURRENT STATUS: HCPCS

## 2018-09-27 PROCEDURE — 6370000000 HC RX 637 (ALT 250 FOR IP): Performed by: NURSE PRACTITIONER

## 2018-09-27 PROCEDURE — 97161 PT EVAL LOW COMPLEX 20 MIN: CPT

## 2018-09-27 PROCEDURE — 99233 SBSQ HOSP IP/OBS HIGH 50: CPT | Performed by: INTERNAL MEDICINE

## 2018-09-27 PROCEDURE — G8988 SELF CARE GOAL STATUS: HCPCS

## 2018-09-27 PROCEDURE — 71046 X-RAY EXAM CHEST 2 VIEWS: CPT

## 2018-09-27 PROCEDURE — APPSS30 APP SPLIT SHARED TIME 16-30 MINUTES: Performed by: NURSE PRACTITIONER

## 2018-09-27 PROCEDURE — 80048 BASIC METABOLIC PNL TOTAL CA: CPT

## 2018-09-27 PROCEDURE — 2060000000 HC ICU INTERMEDIATE R&B

## 2018-09-27 PROCEDURE — 85651 RBC SED RATE NONAUTOMATED: CPT

## 2018-09-27 PROCEDURE — 97165 OT EVAL LOW COMPLEX 30 MIN: CPT

## 2018-09-27 PROCEDURE — 97530 THERAPEUTIC ACTIVITIES: CPT

## 2018-09-27 PROCEDURE — 85610 PROTHROMBIN TIME: CPT

## 2018-09-27 PROCEDURE — 85025 COMPLETE CBC W/AUTO DIFF WBC: CPT

## 2018-09-27 PROCEDURE — 86140 C-REACTIVE PROTEIN: CPT

## 2018-09-27 RX ORDER — PHYTONADIONE 5 MG/1
5 TABLET ORAL ONCE
Status: COMPLETED | OUTPATIENT
Start: 2018-09-27 | End: 2018-09-27

## 2018-09-27 RX ORDER — MORPHINE SULFATE 15 MG/1
15 TABLET ORAL EVERY 12 HOURS
Status: DISCONTINUED | OUTPATIENT
Start: 2018-09-27 | End: 2018-10-09 | Stop reason: HOSPADM

## 2018-09-27 RX ORDER — HYDROCODONE BITARTRATE AND ACETAMINOPHEN 5; 325 MG/1; MG/1
1 TABLET ORAL EVERY 6 HOURS PRN
Status: DISCONTINUED | OUTPATIENT
Start: 2018-09-27 | End: 2018-10-09 | Stop reason: HOSPADM

## 2018-09-27 RX ADMIN — ISOSORBIDE MONONITRATE 60 MG: 60 TABLET, EXTENDED RELEASE ORAL at 08:50

## 2018-09-27 RX ADMIN — METOPROLOL TARTRATE 50 MG: 50 TABLET ORAL at 20:28

## 2018-09-27 RX ADMIN — BECLOMETHASONE DIPROPIONATE HFA 1 PUFF: 40 AEROSOL, METERED RESPIRATORY (INHALATION) at 09:56

## 2018-09-27 RX ADMIN — Medication 10 ML: at 08:49

## 2018-09-27 RX ADMIN — METHYLPREDNISOLONE SODIUM SUCCINATE 20 MG: 40 INJECTION, POWDER, LYOPHILIZED, FOR SOLUTION INTRAMUSCULAR; INTRAVENOUS at 16:54

## 2018-09-27 RX ADMIN — FUROSEMIDE 20 MG: 20 TABLET ORAL at 20:28

## 2018-09-27 RX ADMIN — Medication 10 ML: at 10:56

## 2018-09-27 RX ADMIN — POTASSIUM CHLORIDE 20 MEQ: 20 TABLET, EXTENDED RELEASE ORAL at 08:50

## 2018-09-27 RX ADMIN — BECLOMETHASONE DIPROPIONATE HFA 1 PUFF: 40 AEROSOL, METERED RESPIRATORY (INHALATION) at 21:09

## 2018-09-27 RX ADMIN — Medication 10 ML: at 14:39

## 2018-09-27 RX ADMIN — LEVOTHYROXINE SODIUM 25 MCG: 25 TABLET ORAL at 06:35

## 2018-09-27 RX ADMIN — TRAZODONE HYDROCHLORIDE 100 MG: 50 TABLET ORAL at 20:28

## 2018-09-27 RX ADMIN — METHYLPREDNISOLONE SODIUM SUCCINATE 20 MG: 40 INJECTION, POWDER, LYOPHILIZED, FOR SOLUTION INTRAMUSCULAR; INTRAVENOUS at 06:32

## 2018-09-27 RX ADMIN — MORPHINE SULFATE 2 MG: 2 INJECTION, SOLUTION INTRAMUSCULAR; INTRAVENOUS at 14:39

## 2018-09-27 RX ADMIN — CYCLOBENZAPRINE HYDROCHLORIDE 5 MG: 10 TABLET, FILM COATED ORAL at 16:54

## 2018-09-27 RX ADMIN — CYCLOBENZAPRINE HYDROCHLORIDE 5 MG: 10 TABLET, FILM COATED ORAL at 08:50

## 2018-09-27 RX ADMIN — FAMOTIDINE 20 MG: 20 TABLET ORAL at 08:49

## 2018-09-27 RX ADMIN — Medication 10 ML: at 16:54

## 2018-09-27 RX ADMIN — FUROSEMIDE 20 MG: 20 TABLET ORAL at 08:50

## 2018-09-27 RX ADMIN — PHYTONADIONE 5 MG: 5 TABLET ORAL at 11:51

## 2018-09-27 RX ADMIN — CYCLOBENZAPRINE HYDROCHLORIDE 5 MG: 10 TABLET, FILM COATED ORAL at 23:31

## 2018-09-27 RX ADMIN — MORPHINE SULFATE 15 MG: 30 TABLET ORAL at 20:27

## 2018-09-27 RX ADMIN — Medication 10 ML: at 20:28

## 2018-09-27 RX ADMIN — FUROSEMIDE 20 MG: 20 TABLET ORAL at 13:47

## 2018-09-27 RX ADMIN — POTASSIUM CHLORIDE 20 MEQ: 20 TABLET, EXTENDED RELEASE ORAL at 20:27

## 2018-09-27 RX ADMIN — MULTIPLE VITAMINS W/ MINERALS TAB 1 TABLET: TAB at 16:54

## 2018-09-27 RX ADMIN — PRAVASTATIN SODIUM 80 MG: 20 TABLET ORAL at 08:50

## 2018-09-27 RX ADMIN — METOPROLOL TARTRATE 50 MG: 50 TABLET ORAL at 08:50

## 2018-09-27 RX ADMIN — ONDANSETRON HYDROCHLORIDE 4 MG: 2 INJECTION, SOLUTION INTRAMUSCULAR; INTRAVENOUS at 10:56

## 2018-09-27 ASSESSMENT — PAIN DESCRIPTION - FREQUENCY: FREQUENCY: INTERMITTENT

## 2018-09-27 ASSESSMENT — PAIN SCALES - GENERAL
PAINLEVEL_OUTOF10: 0
PAINLEVEL_OUTOF10: 0
PAINLEVEL_OUTOF10: 10
PAINLEVEL_OUTOF10: 1
PAINLEVEL_OUTOF10: 0
PAINLEVEL_OUTOF10: 9

## 2018-09-27 ASSESSMENT — PAIN DESCRIPTION - LOCATION: LOCATION: LEG

## 2018-09-27 ASSESSMENT — PAIN DESCRIPTION - DESCRIPTORS: DESCRIPTORS: ACHING;CONSTANT;DISCOMFORT

## 2018-09-27 ASSESSMENT — PAIN DESCRIPTION - ONSET: ONSET: ON-GOING

## 2018-09-27 ASSESSMENT — PAIN DESCRIPTION - PAIN TYPE: TYPE: CHRONIC PAIN

## 2018-09-27 ASSESSMENT — PAIN DESCRIPTION - PROGRESSION: CLINICAL_PROGRESSION: NOT CHANGED

## 2018-09-27 ASSESSMENT — PAIN DESCRIPTION - ORIENTATION: ORIENTATION: RIGHT;LEFT

## 2018-09-27 NOTE — CONSULTS
splenic  enlargement. No ascites or dependent edema. RECTAL:  Her rectal exam was deferred. ASSESSMENT:  She has bled in the past on anticoagulant therapy. The  elevated BUN-to-creatinine ratio in the past has suggested an upper GI  source, which was never confirmed. Colonoscopy showed diverticular  disease. Her CAT scan in 2015 did not suggest small bowel problems or  small bowel dilatation. She has been given to  frequent bouts of abdominal pain  and diarrhea at home raising questions that the bleeding and the abdominal  distention and pain are related making at least Crohn's disease a  possibility. Small bowel Crohn's in the elderly would be unusual.It is usually   located in the colon in this age group. Joaquin Terrazas However, this is clearly small bowel issue. Not clear if the bleeding and the obstruction are related or independent . Smallbowel obstruction from adhesions from a previous hysterectomy and AV  malformation or whether there is a bleeding site related to the bowel  obstruction such as Crohn's disease. I would favor vitamin K to reverse the INR, but it looks like a single unit  of fresh frozens to be given. With an INR of 9,  will withhold the  nasogastric tube for fear of prompting epistaxis. We will empirically give  a couple of days of steroids. Small bowel series vs CT enetrogrphy  Colonoscopy with  an attempt to visualize the small bowel is a possibility as well, but for for now   will deal with the bleeding first..         Lea Mahoney MD    D: 09/26/2018 15:20:40       T: 09/26/2018 20:01:38     [email or web address]  Job#: [id number]     Doc#: [id number]    CC:

## 2018-09-27 NOTE — PROGRESS NOTES
Spoke briefly with Dr. Attila Simeon regarding patient's plan of care and inquired if patient is a good candidate for therapy. Per Dr. Attila Simeon we can have therapy see the patient. Also doctor would like a stool for blood sent for evaluation to see if patient is actively bleeding currently and he would like updated with results of today's INR to see if further treatment is needed.      Electronically signed by David Drake RN on 9/27/2018 at 9:13 AM

## 2018-09-27 NOTE — PROGRESS NOTES
Physical Therapy    Facility/Department: [facility] SURG  Initial Assessment    NAME: Jorge Kemp  : 1928  MRN: [id number]    Date of Service: 2018      Patient Diagnosis(es): The primary encounter diagnosis was Terminal ileitis with rectal bleeding ([address] Diagnoses of Supratherapeutic INR, Generalized abdominal pain, History of atrial fibrillation, Chronic anticoagulation, and Gastrointestinal hemorrhage, unspecified gastrointestinal hemorrhage type were also pertinent to this visit. has a past medical history of A-fib ([address] Arthritis; Blood transfusion; Blood transfusion reaction; CAD (coronary artery disease); Cancer [facility]); CHF (congestive heart failure) ([facility] Chronic back pain; Chronic kidney disease; GERD (gastroesophageal reflux disease); GI bleed; History of blood transfusion; Hyperlipidemia; Hypertension; Pacemaker; Psychiatric problem; Syncope; and Thyroid disease. has a past surgical history that includes Cardiac pacemaker placement (3/8/11); skin biopsy; ECHO Compl W Dop Color Flow (2012); Coronary artery bypass graft; Coronary angioplasty with stent (); Colonoscopy; Tonsillectomy; Appendectomy; Hysterectomy; Endoscopy, colon, diagnostic; Cardioversion (2012); Cardiac catheterization (2014); Breast surgery; eye surgery; and pacemaker placement. Evaluating Therapist: Jonathon Alvares PT     Room #:  989   DIAGNOSIS:  Terminal ilietis with rectal bleeding   PRECAUTIONS: falls, waffle cusion     Social:  Pt lives with  Daughter  in a  1  floor plan  2  steps and  1  rails to enter. Prior to admission pt walked with ww  Pt lives in [place],  here visiting      Initial Evaluation  Date:  18 Treatment      Short Term/ Long Term   Goals   Was pt agreeable to Eval/treatment?   yes      Does pt have pain?  none reported      Bed Mobility  Rolling:  Min assist   Supine to sit:  Min assist   Sit to supine:  NT   Scooting:  Min assist in sit    SBA    Transfers Sit to

## 2018-09-27 NOTE — PROGRESS NOTES
CARDIAC PACEMAKER PLACEMENT  3/8/11    CARDIOVERSION  06/28/2012    Dr Zimmer Half:  DCCV 1 shock 48 joules= successful    COLONOSCOPY      CORONARY ANGIOPLASTY WITH STENT PLACEMENT  2009     2 stents    CORONARY ARTERY BYPASS GRAFT      ECHO COMPL W DOP COLOR FLOW  5/23/2012         ENDOSCOPY, COLON, DIAGNOSTIC      EYE SURGERY      bilat cataracts    HYSTERECTOMY      PACEMAKER PLACEMENT      [facility] 5/2011    SKIN BIOPSY      TONSILLECTOMY       Precautions: falls risk; skin integrity; chair alarms     Home Living/PLOF: Patient currently visiting family in [facility]; patient lives in [place] with her daughter in a one-floor home. ADLs: Independent   IADLs: Needed assistance   Functional Transfers/Mobility: Independent (with walker)  Family/Caregiver Assistance Available: Patient noted that her daughter can assist with ADLs/IADLs. Pain Level: Patient denied experiencing pain. Hearing: WFL grossly  Vision: WFL grossly     Cognition: Patient alert and oriented grossly. Patient pleasant and cooperative. Problem Solving: Fair+  Safety Awareness: Fair    UE Strength/ROM:   Strength: ROM:  Comments:   R UE:  3+/5 grossly WFL grossly    L UE: 3+/5 grossly WFL grossly       Functional Assessment:   Initial Status:  9/27/2018  Comments:   Feeding: Setup    Grooming: Min A (standing at sink)   Upper Body Dressing: Min A  Min A to doff/don hospital gown while seated on BSC. Lower Body Dressing: Max A Max to doff/don socks. Max A to thread B LEs into disposable brief while seated on BSC; Mod A given to pull up and adjust disposable brief while standing. Cues given to maximize safety with use of walker while pulling up and arranging disposable brief. Bathing: Max A Max A for completion of lower body sponge bathing while seated following episode of bowel incontinence. Toileting: Max A Max A for completion of perineal hygiene following episode of bowel incontinence.    Bed Mobility: Supine-to-Sit:

## 2018-09-27 NOTE — PROGRESS NOTES
PATIENT'S C-DIFF SPECIMEN NEGATIVE, PATIENT TAKEN OUT OF ISOLATION FOR R/O C-DIFF.      Electronically signed by Chely Cabral RN on 9/27/2018 at 2:13 PM

## 2018-09-28 ENCOUNTER — APPOINTMENT (OUTPATIENT)
Dept: GENERAL RADIOLOGY | Age: 83
DRG: 330 | End: 2018-09-28
Payer: MEDICARE

## 2018-09-28 PROBLEM — E44.0 MODERATE PROTEIN-CALORIE MALNUTRITION (HCC): Chronic | Status: ACTIVE | Noted: 2018-09-28

## 2018-09-28 LAB
ANION GAP SERPL CALCULATED.3IONS-SCNC: 18 MMOL/L (ref 7–16)
BASOPHILS ABSOLUTE: 0.01 E9/L (ref 0–0.2)
BASOPHILS RELATIVE PERCENT: 0.1 % (ref 0–2)
BUN BLDV-MCNC: 38 MG/DL (ref 8–23)
CALCIUM SERPL-MCNC: 9.6 MG/DL (ref 8.6–10.2)
CHLORIDE BLD-SCNC: 101 MMOL/L (ref 98–107)
CO2: 21 MMOL/L (ref 22–29)
CREAT SERPL-MCNC: 1.1 MG/DL (ref 0.5–1)
EOSINOPHILS ABSOLUTE: 0 E9/L (ref 0.05–0.5)
EOSINOPHILS RELATIVE PERCENT: 0 % (ref 0–6)
GFR AFRICAN AMERICAN: 56
GFR NON-AFRICAN AMERICAN: 47 ML/MIN/1.73
GLUCOSE BLD-MCNC: 134 MG/DL (ref 74–109)
HCT VFR BLD CALC: 27.7 % (ref 34–48)
HEMOGLOBIN: 8.4 G/DL (ref 11.5–15.5)
IMMATURE GRANULOCYTES #: 0.29 E9/L
IMMATURE GRANULOCYTES %: 2.9 % (ref 0–5)
INR BLD: 2
LYMPHOCYTES ABSOLUTE: 0.7 E9/L (ref 1.5–4)
LYMPHOCYTES RELATIVE PERCENT: 7 % (ref 20–42)
MCH RBC QN AUTO: 29.6 PG (ref 26–35)
MCHC RBC AUTO-ENTMCNC: 30.3 % (ref 32–34.5)
MCV RBC AUTO: 97.5 FL (ref 80–99.9)
MONOCYTES ABSOLUTE: 0.25 E9/L (ref 0.1–0.95)
MONOCYTES RELATIVE PERCENT: 2.5 % (ref 2–12)
NEUTROPHILS ABSOLUTE: 8.77 E9/L (ref 1.8–7.3)
NEUTROPHILS RELATIVE PERCENT: 87.5 % (ref 43–80)
OCCULT BLOOD SCREENING: NORMAL
PDW BLD-RTO: 15.1 FL (ref 11.5–15)
PLATELET # BLD: 245 E9/L (ref 130–450)
PMV BLD AUTO: 10.1 FL (ref 7–12)
POTASSIUM SERPL-SCNC: 3.7 MMOL/L (ref 3.5–5)
PROTHROMBIN TIME: 22.6 SEC (ref 9.3–12.4)
RBC # BLD: 2.84 E12/L (ref 3.5–5.5)
SODIUM BLD-SCNC: 140 MMOL/L (ref 132–146)
WBC # BLD: 10 E9/L (ref 4.5–11.5)

## 2018-09-28 PROCEDURE — 85025 COMPLETE CBC W/AUTO DIFF WBC: CPT

## 2018-09-28 PROCEDURE — 97535 SELF CARE MNGMENT TRAINING: CPT

## 2018-09-28 PROCEDURE — 2060000000 HC ICU INTERMEDIATE R&B

## 2018-09-28 PROCEDURE — 94640 AIRWAY INHALATION TREATMENT: CPT

## 2018-09-28 PROCEDURE — 97530 THERAPEUTIC ACTIVITIES: CPT

## 2018-09-28 PROCEDURE — APPSS30 APP SPLIT SHARED TIME 16-30 MINUTES: Performed by: NURSE PRACTITIONER

## 2018-09-28 PROCEDURE — 6370000000 HC RX 637 (ALT 250 FOR IP): Performed by: INTERNAL MEDICINE

## 2018-09-28 PROCEDURE — 6370000000 HC RX 637 (ALT 250 FOR IP): Performed by: NURSE PRACTITIONER

## 2018-09-28 PROCEDURE — 74022 RADEX COMPL AQT ABD SERIES: CPT

## 2018-09-28 PROCEDURE — 85610 PROTHROMBIN TIME: CPT

## 2018-09-28 PROCEDURE — 2580000003 HC RX 258: Performed by: INTERNAL MEDICINE

## 2018-09-28 PROCEDURE — 36415 COLL VENOUS BLD VENIPUNCTURE: CPT

## 2018-09-28 PROCEDURE — 6360000002 HC RX W HCPCS: Performed by: INTERNAL MEDICINE

## 2018-09-28 PROCEDURE — 99232 SBSQ HOSP IP/OBS MODERATE 35: CPT | Performed by: INTERNAL MEDICINE

## 2018-09-28 PROCEDURE — 80048 BASIC METABOLIC PNL TOTAL CA: CPT

## 2018-09-28 RX ORDER — SODIUM CHLORIDE 9 MG/ML
INJECTION, SOLUTION INTRAVENOUS CONTINUOUS
Status: DISCONTINUED | OUTPATIENT
Start: 2018-09-28 | End: 2018-10-05

## 2018-09-28 RX ADMIN — SODIUM CHLORIDE: 9 INJECTION, SOLUTION INTRAVENOUS at 13:42

## 2018-09-28 RX ADMIN — HYDROCODONE BITARTRATE AND ACETAMINOPHEN 1 TABLET: 5; 325 TABLET ORAL at 04:20

## 2018-09-28 RX ADMIN — MORPHINE SULFATE 15 MG: 30 TABLET ORAL at 21:53

## 2018-09-28 RX ADMIN — METHYLPREDNISOLONE SODIUM SUCCINATE 20 MG: 40 INJECTION, POWDER, LYOPHILIZED, FOR SOLUTION INTRAMUSCULAR; INTRAVENOUS at 17:22

## 2018-09-28 RX ADMIN — MORPHINE SULFATE 15 MG: 30 TABLET ORAL at 09:37

## 2018-09-28 RX ADMIN — Medication 10 ML: at 05:44

## 2018-09-28 RX ADMIN — POTASSIUM CHLORIDE 20 MEQ: 20 TABLET, EXTENDED RELEASE ORAL at 09:36

## 2018-09-28 RX ADMIN — CYCLOBENZAPRINE HYDROCHLORIDE 5 MG: 10 TABLET, FILM COATED ORAL at 09:36

## 2018-09-28 RX ADMIN — POTASSIUM CHLORIDE 20 MEQ: 20 TABLET, EXTENDED RELEASE ORAL at 21:52

## 2018-09-28 RX ADMIN — PRAVASTATIN SODIUM 80 MG: 20 TABLET ORAL at 09:35

## 2018-09-28 RX ADMIN — CYCLOBENZAPRINE HYDROCHLORIDE 5 MG: 10 TABLET, FILM COATED ORAL at 23:30

## 2018-09-28 RX ADMIN — Medication 10 ML: at 14:27

## 2018-09-28 RX ADMIN — TRAZODONE HYDROCHLORIDE 100 MG: 50 TABLET ORAL at 21:52

## 2018-09-28 RX ADMIN — BECLOMETHASONE DIPROPIONATE HFA 1 PUFF: 40 AEROSOL, METERED RESPIRATORY (INHALATION) at 10:22

## 2018-09-28 RX ADMIN — ISOSORBIDE MONONITRATE 60 MG: 60 TABLET, EXTENDED RELEASE ORAL at 09:36

## 2018-09-28 RX ADMIN — METOPROLOL TARTRATE 50 MG: 50 TABLET ORAL at 09:36

## 2018-09-28 RX ADMIN — FUROSEMIDE 20 MG: 20 TABLET ORAL at 14:32

## 2018-09-28 RX ADMIN — FUROSEMIDE 20 MG: 20 TABLET ORAL at 21:52

## 2018-09-28 RX ADMIN — METHYLPREDNISOLONE SODIUM SUCCINATE 20 MG: 40 INJECTION, POWDER, LYOPHILIZED, FOR SOLUTION INTRAMUSCULAR; INTRAVENOUS at 05:43

## 2018-09-28 RX ADMIN — FAMOTIDINE 20 MG: 20 TABLET ORAL at 09:36

## 2018-09-28 RX ADMIN — FUROSEMIDE 20 MG: 20 TABLET ORAL at 09:36

## 2018-09-28 RX ADMIN — Medication 10 ML: at 09:36

## 2018-09-28 RX ADMIN — PETROLATUM: 42 OINTMENT TOPICAL at 14:40

## 2018-09-28 RX ADMIN — HYDROCODONE BITARTRATE AND ACETAMINOPHEN 1 TABLET: 5; 325 TABLET ORAL at 11:42

## 2018-09-28 RX ADMIN — LEVOTHYROXINE SODIUM 25 MCG: 25 TABLET ORAL at 05:39

## 2018-09-28 RX ADMIN — BECLOMETHASONE DIPROPIONATE HFA 1 PUFF: 40 AEROSOL, METERED RESPIRATORY (INHALATION) at 22:13

## 2018-09-28 RX ADMIN — MULTIPLE VITAMINS W/ MINERALS TAB 1 TABLET: TAB at 17:23

## 2018-09-28 RX ADMIN — HYDROCODONE BITARTRATE AND ACETAMINOPHEN 1 TABLET: 5; 325 TABLET ORAL at 17:32

## 2018-09-28 RX ADMIN — CYCLOBENZAPRINE HYDROCHLORIDE 5 MG: 10 TABLET, FILM COATED ORAL at 17:22

## 2018-09-28 RX ADMIN — METOPROLOL TARTRATE 50 MG: 50 TABLET ORAL at 21:53

## 2018-09-28 ASSESSMENT — PAIN DESCRIPTION - ORIENTATION
ORIENTATION: LOWER
ORIENTATION: LOWER

## 2018-09-28 ASSESSMENT — PAIN SCALES - GENERAL
PAINLEVEL_OUTOF10: 6
PAINLEVEL_OUTOF10: 9
PAINLEVEL_OUTOF10: 8
PAINLEVEL_OUTOF10: 9
PAINLEVEL_OUTOF10: 3
PAINLEVEL_OUTOF10: 0
PAINLEVEL_OUTOF10: 0
PAINLEVEL_OUTOF10: 7

## 2018-09-28 ASSESSMENT — PAIN DESCRIPTION - PAIN TYPE
TYPE: CHRONIC PAIN

## 2018-09-28 ASSESSMENT — PAIN DESCRIPTION - FREQUENCY
FREQUENCY: CONTINUOUS
FREQUENCY: CONTINUOUS

## 2018-09-28 ASSESSMENT — PAIN DESCRIPTION - ONSET
ONSET: ON-GOING
ONSET: ON-GOING

## 2018-09-28 ASSESSMENT — PAIN DESCRIPTION - LOCATION
LOCATION: BACK
LOCATION: BACK
LOCATION: ABDOMEN;BACK

## 2018-09-28 ASSESSMENT — PAIN DESCRIPTION - DESCRIPTORS
DESCRIPTORS: ACHING;CONSTANT;DISCOMFORT
DESCRIPTORS: ACHING;CONSTANT;DISCOMFORT

## 2018-09-28 ASSESSMENT — PAIN DESCRIPTION - PROGRESSION
CLINICAL_PROGRESSION: NOT CHANGED
CLINICAL_PROGRESSION: NOT CHANGED

## 2018-09-28 NOTE — PROGRESS NOTES
Physical Therapy    Facility/Department: [facility] MED SURG  Treatment note    NAME: Lilia Beck  : 1928  MRN: [id number]    Date of Service: 2018      Patient Diagnosis(es): The primary encounter diagnosis was Terminal ileitis with rectal bleeding ([facility] Diagnoses of Supratherapeutic INR, Generalized abdominal pain, History of atrial fibrillation, Chronic anticoagulation, and Gastrointestinal hemorrhage, unspecified gastrointestinal hemorrhage type were also pertinent to this visit. has a past medical history of A-fib ([facility] Arthritis; Blood transfusion; Blood transfusion reaction; CAD (coronary artery disease); Cancer [facility]); CHF (congestive heart failure) ([facility] Chronic back pain; Chronic kidney disease; GERD (gastroesophageal reflux disease); GI bleed; History of blood transfusion; Hyperlipidemia; Hypertension; Pacemaker; Psychiatric problem; Syncope; and Thyroid disease. has a past surgical history that includes Cardiac pacemaker placement (3/8/11); skin biopsy; ECHO Compl W Dop Color Flow (2012); Coronary artery bypass graft; Coronary angioplasty with stent (); Colonoscopy; Tonsillectomy; Appendectomy; Hysterectomy; Endoscopy, colon, diagnostic; Cardioversion (2012); Cardiac catheterization (2014); Breast surgery; eye surgery; and pacemaker placement. Evaluating Therapist: Eneida Lynn PT     Room #:  425   DIAGNOSIS:  Terminal ilietis with rectal bleeding   PRECAUTIONS: falls, waffle cusion     Social:  Pt lives with  Daughter  in a  1  floor plan  2  steps and  1  rails to enter. Prior to admission pt walked with ww  Pt lives in [place], is here visiting      Initial Evaluation  Date:  18 Treatment  18    Short Term/ Long Term   Goals   Was pt agreeable to Eval/treatment?   yes  yes    Does pt have pain?  none reported  No complaints    Bed Mobility  Rolling:  Min assist   Supine to sit:  Min assist   Sit to supine:  NT   Scooting:  Min assist in sit  NT  SBA

## 2018-09-29 ENCOUNTER — APPOINTMENT (OUTPATIENT)
Dept: CT IMAGING | Age: 83
DRG: 330 | End: 2018-09-29
Payer: MEDICARE

## 2018-09-29 LAB
INR BLD: 2.2
PROTHROMBIN TIME: 24.8 SEC (ref 9.3–12.4)

## 2018-09-29 PROCEDURE — 85610 PROTHROMBIN TIME: CPT

## 2018-09-29 PROCEDURE — 2580000003 HC RX 258: Performed by: INTERNAL MEDICINE

## 2018-09-29 PROCEDURE — 94640 AIRWAY INHALATION TREATMENT: CPT

## 2018-09-29 PROCEDURE — 6370000000 HC RX 637 (ALT 250 FOR IP): Performed by: NURSE PRACTITIONER

## 2018-09-29 PROCEDURE — 2060000000 HC ICU INTERMEDIATE R&B

## 2018-09-29 PROCEDURE — 6370000000 HC RX 637 (ALT 250 FOR IP): Performed by: INTERNAL MEDICINE

## 2018-09-29 PROCEDURE — 6360000002 HC RX W HCPCS: Performed by: INTERNAL MEDICINE

## 2018-09-29 PROCEDURE — 74176 CT ABD & PELVIS W/O CONTRAST: CPT

## 2018-09-29 PROCEDURE — 36415 COLL VENOUS BLD VENIPUNCTURE: CPT

## 2018-09-29 PROCEDURE — 99232 SBSQ HOSP IP/OBS MODERATE 35: CPT | Performed by: INTERNAL MEDICINE

## 2018-09-29 PROCEDURE — 6360000004 HC RX CONTRAST MEDICATION: Performed by: RADIOLOGY

## 2018-09-29 RX ADMIN — HYDROCODONE BITARTRATE AND ACETAMINOPHEN 1 TABLET: 5; 325 TABLET ORAL at 09:02

## 2018-09-29 RX ADMIN — FUROSEMIDE 20 MG: 20 TABLET ORAL at 09:01

## 2018-09-29 RX ADMIN — PRAVASTATIN SODIUM 80 MG: 20 TABLET ORAL at 09:00

## 2018-09-29 RX ADMIN — ISOSORBIDE MONONITRATE 60 MG: 60 TABLET, EXTENDED RELEASE ORAL at 09:00

## 2018-09-29 RX ADMIN — HYDROCODONE BITARTRATE AND ACETAMINOPHEN 1 TABLET: 5; 325 TABLET ORAL at 21:39

## 2018-09-29 RX ADMIN — CYCLOBENZAPRINE HYDROCHLORIDE 5 MG: 10 TABLET, FILM COATED ORAL at 15:47

## 2018-09-29 RX ADMIN — METOPROLOL TARTRATE 50 MG: 50 TABLET ORAL at 20:39

## 2018-09-29 RX ADMIN — POTASSIUM CHLORIDE 20 MEQ: 20 TABLET, EXTENDED RELEASE ORAL at 20:39

## 2018-09-29 RX ADMIN — FUROSEMIDE 20 MG: 20 TABLET ORAL at 20:39

## 2018-09-29 RX ADMIN — LEVOTHYROXINE SODIUM 25 MCG: 25 TABLET ORAL at 07:03

## 2018-09-29 RX ADMIN — IOHEXOL 50 ML: 240 INJECTION, SOLUTION INTRATHECAL; INTRAVASCULAR; INTRAVENOUS; ORAL at 10:58

## 2018-09-29 RX ADMIN — MULTIPLE VITAMINS W/ MINERALS TAB 1 TABLET: TAB at 19:32

## 2018-09-29 RX ADMIN — METHYLPREDNISOLONE SODIUM SUCCINATE 20 MG: 40 INJECTION, POWDER, LYOPHILIZED, FOR SOLUTION INTRAMUSCULAR; INTRAVENOUS at 17:00

## 2018-09-29 RX ADMIN — Medication 10 ML: at 09:02

## 2018-09-29 RX ADMIN — TRAZODONE HYDROCHLORIDE 100 MG: 50 TABLET ORAL at 20:38

## 2018-09-29 RX ADMIN — METHYLPREDNISOLONE SODIUM SUCCINATE 20 MG: 40 INJECTION, POWDER, LYOPHILIZED, FOR SOLUTION INTRAMUSCULAR; INTRAVENOUS at 07:05

## 2018-09-29 RX ADMIN — FAMOTIDINE 20 MG: 20 TABLET ORAL at 09:01

## 2018-09-29 RX ADMIN — POTASSIUM CHLORIDE 20 MEQ: 20 TABLET, EXTENDED RELEASE ORAL at 09:01

## 2018-09-29 RX ADMIN — HYDROCODONE BITARTRATE AND ACETAMINOPHEN 1 TABLET: 5; 325 TABLET ORAL at 15:47

## 2018-09-29 RX ADMIN — METOPROLOL TARTRATE 50 MG: 50 TABLET ORAL at 09:01

## 2018-09-29 RX ADMIN — FUROSEMIDE 20 MG: 20 TABLET ORAL at 15:47

## 2018-09-29 RX ADMIN — MORPHINE SULFATE 15 MG: 30 TABLET ORAL at 09:02

## 2018-09-29 RX ADMIN — MORPHINE SULFATE 15 MG: 30 TABLET ORAL at 20:39

## 2018-09-29 RX ADMIN — BECLOMETHASONE DIPROPIONATE HFA 1 PUFF: 40 AEROSOL, METERED RESPIRATORY (INHALATION) at 19:45

## 2018-09-29 RX ADMIN — CYCLOBENZAPRINE HYDROCHLORIDE 5 MG: 10 TABLET, FILM COATED ORAL at 09:01

## 2018-09-29 ASSESSMENT — PAIN DESCRIPTION - ORIENTATION
ORIENTATION: LOWER;MID
ORIENTATION: LOWER
ORIENTATION: LEFT;LOWER
ORIENTATION: LOWER;MID

## 2018-09-29 ASSESSMENT — PAIN DESCRIPTION - DESCRIPTORS
DESCRIPTORS: ACHING;DISCOMFORT;DULL
DESCRIPTORS: ACHING;CONSTANT;DISCOMFORT;DULL
DESCRIPTORS: ACHING;DISCOMFORT;DULL;NAGGING
DESCRIPTORS: ACHING;DISCOMFORT
DESCRIPTORS: ACHING;DISCOMFORT

## 2018-09-29 ASSESSMENT — PAIN DESCRIPTION - LOCATION
LOCATION: BACK;HIP
LOCATION: BACK;HIP
LOCATION: ABDOMEN
LOCATION: BACK;HIP
LOCATION: ABDOMEN

## 2018-09-29 ASSESSMENT — PAIN SCALES - GENERAL
PAINLEVEL_OUTOF10: 0
PAINLEVEL_OUTOF10: 6
PAINLEVEL_OUTOF10: 8
PAINLEVEL_OUTOF10: 0

## 2018-09-29 ASSESSMENT — PAIN DESCRIPTION - ONSET
ONSET: ON-GOING

## 2018-09-29 ASSESSMENT — PAIN DESCRIPTION - FREQUENCY
FREQUENCY: CONTINUOUS

## 2018-09-29 ASSESSMENT — PAIN DESCRIPTION - PAIN TYPE
TYPE: CHRONIC PAIN

## 2018-09-29 ASSESSMENT — PAIN DESCRIPTION - PROGRESSION
CLINICAL_PROGRESSION: NOT CHANGED

## 2018-09-29 NOTE — PROGRESS NOTES
Progress Note    Admit Date: 9/25/2018      Subjective:     Says vomiting but family denies. In bed doing puzzles and family with her. Not having pain. .     Review of Systems  Constitutional: NAD and w/o fever  Respiratory: thinks she has sinus issues  Gastrointestinal: no evidence of ongoing bleeding and seems not in pain  Genitourinary:negative  Hematologic/lymphatic: no active bleeding  Neurological: alert and oreinted    Objective:     Patient Vitals for the past 24 hrs:   BP Temp Temp src Pulse Resp SpO2 Weight   09/28/18 1618 (!) 136/59 97.9 °F (36.6 °C) Axillary 88 18 95 % -   09/28/18 1045 (!) 155/68 - - 86 - - -   09/28/18 0850 (!) 182/82 98.1 °F (36.7 °C) - 97 18 97 % -   09/28/18 0524 - - - - - - 113 lb 3.2 oz (51.3 kg)   09/27/18 2329 132/79 98.1 °F (36.7 °C) Oral 89 16 94 % -   09/27/18 2223 (!) 149/67 98 °F (36.7 °C) Oral 71 16 95 % -   09/27/18 2148 (!) 145/65 98.4 °F (36.9 °C) Oral 94 16 95 % -       Physical Exam:  General appearance: pale and comfortable  HEENT: anicteric  Lungs: clear  Heart: nsr  Abdomen: less disrtended  Extremities: no edema  Neurologic: intact       Data Review CBC with Differential:    Recent Labs      09/26/18   0858   09/26/18   1810  09/27/18   0850  09/28/18   1032   WBC  9.7   --    --   8.6  10.0   HGB  9.3*   < >  7.7*  8.7*  8.4*   HCT  31.1*   < >  25.3*  29.1*  27.7*   MCV  99.0   --    --   99.7  97.5   PLT  227   --    --   213  245    < > = values in this interval not displayed. Recent Labs      09/26/18   0858  09/27/18   0850  09/28/18   1032   NA  141  141  140   K  3.6  3.5  3.7   CL  107  107  101   CO2  20*  20*  21*   BUN  40*  32*  38*   CREATININE  0.9  0.9  1.1*     No results for input(s): AST, ALT, ALB, BILIDIR, BILITOT, ALKPHOS in the last 72 hours. Assessment:   Abdominal films today w/o evidence of obstruction and no bleeding. Question of Crohn's raised, better on steroids but not clear improvement and steroids are related.         Plan:

## 2018-09-30 LAB
ANION GAP SERPL CALCULATED.3IONS-SCNC: 10 MMOL/L (ref 7–16)
BUN BLDV-MCNC: 38 MG/DL (ref 8–23)
CALCIUM SERPL-MCNC: 8.6 MG/DL (ref 8.6–10.2)
CHLORIDE BLD-SCNC: 101 MMOL/L (ref 98–107)
CO2: 25 MMOL/L (ref 22–29)
CREAT SERPL-MCNC: 1 MG/DL (ref 0.5–1)
GFR AFRICAN AMERICAN: >60
GFR NON-AFRICAN AMERICAN: 52 ML/MIN/1.73
GLUCOSE BLD-MCNC: 149 MG/DL (ref 74–109)
HCT VFR BLD CALC: 28.1 % (ref 34–48)
HEMOGLOBIN: 8.6 G/DL (ref 11.5–15.5)
INR BLD: 2.9
MCH RBC QN AUTO: 29.5 PG (ref 26–35)
MCHC RBC AUTO-ENTMCNC: 30.6 % (ref 32–34.5)
MCV RBC AUTO: 96.2 FL (ref 80–99.9)
PDW BLD-RTO: 14.9 FL (ref 11.5–15)
PLATELET # BLD: 213 E9/L (ref 130–450)
PMV BLD AUTO: 9.9 FL (ref 7–12)
POTASSIUM SERPL-SCNC: 4.1 MMOL/L (ref 3.5–5)
PROTHROMBIN TIME: 32.3 SEC (ref 9.3–12.4)
RBC # BLD: 2.92 E12/L (ref 3.5–5.5)
SODIUM BLD-SCNC: 136 MMOL/L (ref 132–146)
WBC # BLD: 7.8 E9/L (ref 4.5–11.5)

## 2018-09-30 PROCEDURE — 94640 AIRWAY INHALATION TREATMENT: CPT

## 2018-09-30 PROCEDURE — 85610 PROTHROMBIN TIME: CPT

## 2018-09-30 PROCEDURE — 85027 COMPLETE CBC AUTOMATED: CPT

## 2018-09-30 PROCEDURE — 80048 BASIC METABOLIC PNL TOTAL CA: CPT

## 2018-09-30 PROCEDURE — 36415 COLL VENOUS BLD VENIPUNCTURE: CPT

## 2018-09-30 PROCEDURE — 6370000000 HC RX 637 (ALT 250 FOR IP): Performed by: INTERNAL MEDICINE

## 2018-09-30 PROCEDURE — 2060000000 HC ICU INTERMEDIATE R&B

## 2018-09-30 PROCEDURE — 6370000000 HC RX 637 (ALT 250 FOR IP): Performed by: NURSE PRACTITIONER

## 2018-09-30 PROCEDURE — 05HB33Z INSERTION OF INFUSION DEVICE INTO RIGHT BASILIC VEIN, PERCUTANEOUS APPROACH: ICD-10-PCS | Performed by: HOSPITALIST

## 2018-09-30 PROCEDURE — 2580000003 HC RX 258: Performed by: FAMILY MEDICINE

## 2018-09-30 PROCEDURE — 99232 SBSQ HOSP IP/OBS MODERATE 35: CPT | Performed by: FAMILY MEDICINE

## 2018-09-30 PROCEDURE — 6360000002 HC RX W HCPCS: Performed by: FAMILY MEDICINE

## 2018-09-30 PROCEDURE — 2500000003 HC RX 250 WO HCPCS: Performed by: FAMILY MEDICINE

## 2018-09-30 PROCEDURE — 6360000002 HC RX W HCPCS: Performed by: INTERNAL MEDICINE

## 2018-09-30 RX ORDER — HEPARIN SODIUM (PORCINE) LOCK FLUSH IV SOLN 100 UNIT/ML 100 UNIT/ML
100 SOLUTION INTRAVENOUS PRN
Status: DISCONTINUED | OUTPATIENT
Start: 2018-09-30 | End: 2018-10-09 | Stop reason: HOSPADM

## 2018-09-30 RX ORDER — SODIUM CHLORIDE 0.9 % (FLUSH) 0.9 %
10 SYRINGE (ML) INJECTION PRN
Status: DISCONTINUED | OUTPATIENT
Start: 2018-09-30 | End: 2018-10-09 | Stop reason: HOSPADM

## 2018-09-30 RX ORDER — HEPARIN SODIUM (PORCINE) LOCK FLUSH IV SOLN 100 UNIT/ML 100 UNIT/ML
100 SOLUTION INTRAVENOUS EVERY 12 HOURS
Status: DISCONTINUED | OUTPATIENT
Start: 2018-09-30 | End: 2018-10-09 | Stop reason: HOSPADM

## 2018-09-30 RX ORDER — LIDOCAINE HYDROCHLORIDE 10 MG/ML
5 INJECTION, SOLUTION EPIDURAL; INFILTRATION; INTRACAUDAL; PERINEURAL ONCE
Status: COMPLETED | OUTPATIENT
Start: 2018-09-30 | End: 2018-09-30

## 2018-09-30 RX ORDER — SODIUM CHLORIDE 0.9 % (FLUSH) 0.9 %
10 SYRINGE (ML) INJECTION EVERY 12 HOURS SCHEDULED
Status: DISCONTINUED | OUTPATIENT
Start: 2018-09-30 | End: 2018-10-09 | Stop reason: HOSPADM

## 2018-09-30 RX ADMIN — Medication 10 ML: at 21:31

## 2018-09-30 RX ADMIN — METOPROLOL TARTRATE 50 MG: 50 TABLET ORAL at 09:00

## 2018-09-30 RX ADMIN — BECLOMETHASONE DIPROPIONATE HFA 1 PUFF: 40 AEROSOL, METERED RESPIRATORY (INHALATION) at 19:55

## 2018-09-30 RX ADMIN — ISOSORBIDE MONONITRATE 60 MG: 60 TABLET, EXTENDED RELEASE ORAL at 08:57

## 2018-09-30 RX ADMIN — MAGESIUM CITRATE 296 ML: 1.75 LIQUID ORAL at 14:08

## 2018-09-30 RX ADMIN — TRAZODONE HYDROCHLORIDE 100 MG: 50 TABLET ORAL at 21:30

## 2018-09-30 RX ADMIN — PRAVASTATIN SODIUM 80 MG: 20 TABLET ORAL at 08:57

## 2018-09-30 RX ADMIN — FUROSEMIDE 20 MG: 20 TABLET ORAL at 21:31

## 2018-09-30 RX ADMIN — CYCLOBENZAPRINE HYDROCHLORIDE 5 MG: 10 TABLET, FILM COATED ORAL at 23:41

## 2018-09-30 RX ADMIN — HYDROCODONE BITARTRATE AND ACETAMINOPHEN 1 TABLET: 5; 325 TABLET ORAL at 16:39

## 2018-09-30 RX ADMIN — CYCLOBENZAPRINE HYDROCHLORIDE 5 MG: 10 TABLET, FILM COATED ORAL at 16:39

## 2018-09-30 RX ADMIN — POTASSIUM CHLORIDE 20 MEQ: 20 TABLET, EXTENDED RELEASE ORAL at 21:31

## 2018-09-30 RX ADMIN — POTASSIUM CHLORIDE 20 MEQ: 20 TABLET, EXTENDED RELEASE ORAL at 08:59

## 2018-09-30 RX ADMIN — MORPHINE SULFATE 15 MG: 30 TABLET ORAL at 21:30

## 2018-09-30 RX ADMIN — FUROSEMIDE 20 MG: 20 TABLET ORAL at 14:09

## 2018-09-30 RX ADMIN — METOPROLOL TARTRATE 50 MG: 50 TABLET ORAL at 21:31

## 2018-09-30 RX ADMIN — MORPHINE SULFATE 15 MG: 30 TABLET ORAL at 08:56

## 2018-09-30 RX ADMIN — MULTIPLE VITAMINS W/ MINERALS TAB 1 TABLET: TAB at 16:39

## 2018-09-30 RX ADMIN — LEVOTHYROXINE SODIUM 25 MCG: 25 TABLET ORAL at 05:23

## 2018-09-30 RX ADMIN — METHYLPREDNISOLONE SODIUM SUCCINATE 20 MG: 40 INJECTION, POWDER, LYOPHILIZED, FOR SOLUTION INTRAMUSCULAR; INTRAVENOUS at 04:09

## 2018-09-30 RX ADMIN — CYCLOBENZAPRINE HYDROCHLORIDE 5 MG: 10 TABLET, FILM COATED ORAL at 08:56

## 2018-09-30 RX ADMIN — FUROSEMIDE 20 MG: 20 TABLET ORAL at 08:58

## 2018-09-30 RX ADMIN — METHYLPREDNISOLONE SODIUM SUCCINATE 20 MG: 40 INJECTION, POWDER, LYOPHILIZED, FOR SOLUTION INTRAMUSCULAR; INTRAVENOUS at 16:43

## 2018-09-30 RX ADMIN — SODIUM CHLORIDE, PRESERVATIVE FREE 300 UNITS: 5 INJECTION INTRAVENOUS at 16:39

## 2018-09-30 RX ADMIN — LIDOCAINE HYDROCHLORIDE 1 ML: 10 INJECTION, SOLUTION EPIDURAL; INFILTRATION; INTRACAUDAL; PERINEURAL at 16:03

## 2018-09-30 RX ADMIN — FAMOTIDINE 20 MG: 20 TABLET ORAL at 08:57

## 2018-09-30 ASSESSMENT — PAIN DESCRIPTION - ONSET: ONSET: ON-GOING

## 2018-09-30 ASSESSMENT — PAIN DESCRIPTION - PAIN TYPE
TYPE: CHRONIC PAIN
TYPE: CHRONIC PAIN

## 2018-09-30 ASSESSMENT — PAIN DESCRIPTION - FREQUENCY: FREQUENCY: CONTINUOUS

## 2018-09-30 ASSESSMENT — PAIN SCALES - GENERAL
PAINLEVEL_OUTOF10: 3
PAINLEVEL_OUTOF10: 8
PAINLEVEL_OUTOF10: 8
PAINLEVEL_OUTOF10: 0
PAINLEVEL_OUTOF10: 0
PAINLEVEL_OUTOF10: 8
PAINLEVEL_OUTOF10: 7

## 2018-09-30 ASSESSMENT — PAIN DESCRIPTION - LOCATION
LOCATION: ABDOMEN
LOCATION: BACK;HIP;KNEE

## 2018-09-30 ASSESSMENT — PAIN DESCRIPTION - DESCRIPTORS
DESCRIPTORS: ACHING;CONSTANT;DISCOMFORT;DULL
DESCRIPTORS: ACHING;DISCOMFORT

## 2018-09-30 ASSESSMENT — PAIN DESCRIPTION - ORIENTATION
ORIENTATION: RIGHT;LEFT;LOWER;MID
ORIENTATION: LOWER

## 2018-09-30 ASSESSMENT — PAIN DESCRIPTION - PROGRESSION
CLINICAL_PROGRESSION: NOT CHANGED
CLINICAL_PROGRESSION: NOT CHANGED

## 2018-10-01 LAB
INR BLD: 2.8
PROTHROMBIN TIME: 31.8 SEC (ref 9.3–12.4)

## 2018-10-01 PROCEDURE — 2580000003 HC RX 258: Performed by: FAMILY MEDICINE

## 2018-10-01 PROCEDURE — 1200000000 HC SEMI PRIVATE

## 2018-10-01 PROCEDURE — 36415 COLL VENOUS BLD VENIPUNCTURE: CPT

## 2018-10-01 PROCEDURE — 6370000000 HC RX 637 (ALT 250 FOR IP): Performed by: INTERNAL MEDICINE

## 2018-10-01 PROCEDURE — 6360000002 HC RX W HCPCS: Performed by: INTERNAL MEDICINE

## 2018-10-01 PROCEDURE — 99232 SBSQ HOSP IP/OBS MODERATE 35: CPT | Performed by: INTERNAL MEDICINE

## 2018-10-01 PROCEDURE — 97530 THERAPEUTIC ACTIVITIES: CPT

## 2018-10-01 PROCEDURE — 97535 SELF CARE MNGMENT TRAINING: CPT

## 2018-10-01 PROCEDURE — 94640 AIRWAY INHALATION TREATMENT: CPT

## 2018-10-01 PROCEDURE — 2580000003 HC RX 258: Performed by: INTERNAL MEDICINE

## 2018-10-01 PROCEDURE — 6370000000 HC RX 637 (ALT 250 FOR IP): Performed by: NURSE PRACTITIONER

## 2018-10-01 PROCEDURE — 85610 PROTHROMBIN TIME: CPT

## 2018-10-01 RX ADMIN — LEVOTHYROXINE SODIUM 25 MCG: 25 TABLET ORAL at 06:55

## 2018-10-01 RX ADMIN — FUROSEMIDE 20 MG: 20 TABLET ORAL at 20:21

## 2018-10-01 RX ADMIN — MULTIPLE VITAMINS W/ MINERALS TAB 1 TABLET: TAB at 17:09

## 2018-10-01 RX ADMIN — MORPHINE SULFATE 15 MG: 30 TABLET ORAL at 20:20

## 2018-10-01 RX ADMIN — BECLOMETHASONE DIPROPIONATE HFA 1 PUFF: 40 AEROSOL, METERED RESPIRATORY (INHALATION) at 10:10

## 2018-10-01 RX ADMIN — CYCLOBENZAPRINE HYDROCHLORIDE 5 MG: 10 TABLET, FILM COATED ORAL at 09:33

## 2018-10-01 RX ADMIN — MAGESIUM CITRATE 296 ML: 1.75 LIQUID ORAL at 15:49

## 2018-10-01 RX ADMIN — FUROSEMIDE 20 MG: 20 TABLET ORAL at 09:33

## 2018-10-01 RX ADMIN — METHYLPREDNISOLONE SODIUM SUCCINATE 20 MG: 40 INJECTION, POWDER, LYOPHILIZED, FOR SOLUTION INTRAMUSCULAR; INTRAVENOUS at 17:10

## 2018-10-01 RX ADMIN — PRAVASTATIN SODIUM 80 MG: 20 TABLET ORAL at 09:33

## 2018-10-01 RX ADMIN — CYCLOBENZAPRINE HYDROCHLORIDE 5 MG: 10 TABLET, FILM COATED ORAL at 17:15

## 2018-10-01 RX ADMIN — METOPROLOL TARTRATE 50 MG: 50 TABLET ORAL at 20:21

## 2018-10-01 RX ADMIN — MORPHINE SULFATE 15 MG: 30 TABLET ORAL at 09:32

## 2018-10-01 RX ADMIN — Medication 10 ML: at 09:33

## 2018-10-01 RX ADMIN — BECLOMETHASONE DIPROPIONATE HFA 1 PUFF: 40 AEROSOL, METERED RESPIRATORY (INHALATION) at 21:57

## 2018-10-01 RX ADMIN — CYCLOBENZAPRINE HYDROCHLORIDE 5 MG: 10 TABLET, FILM COATED ORAL at 23:57

## 2018-10-01 RX ADMIN — HYDROCODONE BITARTRATE AND ACETAMINOPHEN 1 TABLET: 5; 325 TABLET ORAL at 11:27

## 2018-10-01 RX ADMIN — METOPROLOL TARTRATE 50 MG: 50 TABLET ORAL at 09:32

## 2018-10-01 RX ADMIN — POTASSIUM CHLORIDE 20 MEQ: 20 TABLET, EXTENDED RELEASE ORAL at 20:21

## 2018-10-01 RX ADMIN — FAMOTIDINE 20 MG: 20 TABLET ORAL at 09:33

## 2018-10-01 RX ADMIN — METHYLPREDNISOLONE SODIUM SUCCINATE 20 MG: 40 INJECTION, POWDER, LYOPHILIZED, FOR SOLUTION INTRAMUSCULAR; INTRAVENOUS at 04:17

## 2018-10-01 RX ADMIN — Medication 10 ML: at 17:10

## 2018-10-01 RX ADMIN — TRAZODONE HYDROCHLORIDE 100 MG: 50 TABLET ORAL at 20:21

## 2018-10-01 RX ADMIN — FUROSEMIDE 20 MG: 20 TABLET ORAL at 15:39

## 2018-10-01 RX ADMIN — ISOSORBIDE MONONITRATE 60 MG: 60 TABLET, EXTENDED RELEASE ORAL at 09:33

## 2018-10-01 RX ADMIN — SODIUM CHLORIDE: 9 INJECTION, SOLUTION INTRAVENOUS at 19:33

## 2018-10-01 RX ADMIN — Medication 10 ML: at 20:21

## 2018-10-01 RX ADMIN — POTASSIUM CHLORIDE 20 MEQ: 20 TABLET, EXTENDED RELEASE ORAL at 09:33

## 2018-10-01 RX ADMIN — HYDROCODONE BITARTRATE AND ACETAMINOPHEN 1 TABLET: 5; 325 TABLET ORAL at 18:14

## 2018-10-01 ASSESSMENT — PAIN SCALES - GENERAL
PAINLEVEL_OUTOF10: 3
PAINLEVEL_OUTOF10: 0
PAINLEVEL_OUTOF10: 0
PAINLEVEL_OUTOF10: 8
PAINLEVEL_OUTOF10: 3
PAINLEVEL_OUTOF10: 9
PAINLEVEL_OUTOF10: 0
PAINLEVEL_OUTOF10: 8
PAINLEVEL_OUTOF10: 6

## 2018-10-01 ASSESSMENT — PAIN DESCRIPTION - ORIENTATION
ORIENTATION: LOWER

## 2018-10-01 ASSESSMENT — PAIN DESCRIPTION - FREQUENCY
FREQUENCY: CONTINUOUS

## 2018-10-01 ASSESSMENT — PAIN DESCRIPTION - PAIN TYPE
TYPE: ACUTE PAIN
TYPE: CHRONIC PAIN

## 2018-10-01 ASSESSMENT — PAIN DESCRIPTION - PROGRESSION
CLINICAL_PROGRESSION: NOT CHANGED

## 2018-10-01 ASSESSMENT — PAIN DESCRIPTION - LOCATION
LOCATION: ABDOMEN

## 2018-10-01 ASSESSMENT — PAIN DESCRIPTION - DESCRIPTORS
DESCRIPTORS: ACHING;DISCOMFORT;CRAMPING

## 2018-10-01 ASSESSMENT — PAIN DESCRIPTION - ONSET
ONSET: ON-GOING

## 2018-10-02 LAB
ANION GAP SERPL CALCULATED.3IONS-SCNC: 8 MMOL/L (ref 7–16)
BUN BLDV-MCNC: 21 MG/DL (ref 8–23)
CALCIUM SERPL-MCNC: 8.9 MG/DL (ref 8.6–10.2)
CHLORIDE BLD-SCNC: 97 MMOL/L (ref 98–107)
CO2: 33 MMOL/L (ref 22–29)
CREAT SERPL-MCNC: 0.7 MG/DL (ref 0.5–1)
GFR AFRICAN AMERICAN: >60
GFR NON-AFRICAN AMERICAN: >60 ML/MIN/1.73
GLUCOSE BLD-MCNC: 120 MG/DL (ref 74–109)
HCT VFR BLD CALC: 28.6 % (ref 34–48)
HEMOGLOBIN: 8.9 G/DL (ref 11.5–15.5)
INR BLD: 2.9
MCH RBC QN AUTO: 29.9 PG (ref 26–35)
MCHC RBC AUTO-ENTMCNC: 31.1 % (ref 32–34.5)
MCV RBC AUTO: 96 FL (ref 80–99.9)
PDW BLD-RTO: 15.2 FL (ref 11.5–15)
PLATELET # BLD: 242 E9/L (ref 130–450)
PMV BLD AUTO: 9.1 FL (ref 7–12)
POTASSIUM SERPL-SCNC: 4 MMOL/L (ref 3.5–5)
PROTHROMBIN TIME: 33.3 SEC (ref 9.3–12.4)
RBC # BLD: 2.98 E12/L (ref 3.5–5.5)
SODIUM BLD-SCNC: 138 MMOL/L (ref 132–146)
WBC # BLD: 11 E9/L (ref 4.5–11.5)

## 2018-10-02 PROCEDURE — 2580000003 HC RX 258: Performed by: FAMILY MEDICINE

## 2018-10-02 PROCEDURE — 6360000002 HC RX W HCPCS: Performed by: INTERNAL MEDICINE

## 2018-10-02 PROCEDURE — 2580000003 HC RX 258: Performed by: INTERNAL MEDICINE

## 2018-10-02 PROCEDURE — 85610 PROTHROMBIN TIME: CPT

## 2018-10-02 PROCEDURE — 97530 THERAPEUTIC ACTIVITIES: CPT

## 2018-10-02 PROCEDURE — 6360000002 HC RX W HCPCS: Performed by: FAMILY MEDICINE

## 2018-10-02 PROCEDURE — 6370000000 HC RX 637 (ALT 250 FOR IP): Performed by: HOSPITALIST

## 2018-10-02 PROCEDURE — 94640 AIRWAY INHALATION TREATMENT: CPT

## 2018-10-02 PROCEDURE — 85027 COMPLETE CBC AUTOMATED: CPT

## 2018-10-02 PROCEDURE — 6370000000 HC RX 637 (ALT 250 FOR IP): Performed by: INTERNAL MEDICINE

## 2018-10-02 PROCEDURE — 99223 1ST HOSP IP/OBS HIGH 75: CPT | Performed by: HOSPITALIST

## 2018-10-02 PROCEDURE — 80048 BASIC METABOLIC PNL TOTAL CA: CPT

## 2018-10-02 PROCEDURE — 36415 COLL VENOUS BLD VENIPUNCTURE: CPT

## 2018-10-02 PROCEDURE — 36592 COLLECT BLOOD FROM PICC: CPT

## 2018-10-02 PROCEDURE — 1200000000 HC SEMI PRIVATE

## 2018-10-02 PROCEDURE — 6370000000 HC RX 637 (ALT 250 FOR IP): Performed by: NURSE PRACTITIONER

## 2018-10-02 RX ORDER — HYDROCHLOROTHIAZIDE 25 MG/1
25 TABLET ORAL DAILY
Status: DISCONTINUED | OUTPATIENT
Start: 2018-10-02 | End: 2018-10-08

## 2018-10-02 RX ORDER — HYDRALAZINE HYDROCHLORIDE 20 MG/ML
20 INJECTION INTRAMUSCULAR; INTRAVENOUS EVERY 6 HOURS PRN
Status: DISCONTINUED | OUTPATIENT
Start: 2018-10-02 | End: 2018-10-09 | Stop reason: HOSPADM

## 2018-10-02 RX ORDER — LISINOPRIL 20 MG/1
20 TABLET ORAL 2 TIMES DAILY
Status: DISCONTINUED | OUTPATIENT
Start: 2018-10-02 | End: 2018-10-08

## 2018-10-02 RX ADMIN — CYCLOBENZAPRINE HYDROCHLORIDE 5 MG: 10 TABLET, FILM COATED ORAL at 09:40

## 2018-10-02 RX ADMIN — ISOSORBIDE MONONITRATE 60 MG: 60 TABLET, EXTENDED RELEASE ORAL at 09:40

## 2018-10-02 RX ADMIN — PRAVASTATIN SODIUM 80 MG: 20 TABLET ORAL at 09:39

## 2018-10-02 RX ADMIN — LISINOPRIL 20 MG: 20 TABLET ORAL at 09:44

## 2018-10-02 RX ADMIN — HYDROCODONE BITARTRATE AND ACETAMINOPHEN 1 TABLET: 5; 325 TABLET ORAL at 09:40

## 2018-10-02 RX ADMIN — BECLOMETHASONE DIPROPIONATE HFA 1 PUFF: 40 AEROSOL, METERED RESPIRATORY (INHALATION) at 21:14

## 2018-10-02 RX ADMIN — SODIUM CHLORIDE, PRESERVATIVE FREE 100 UNITS: 5 INJECTION INTRAVENOUS at 15:55

## 2018-10-02 RX ADMIN — METOPROLOL TARTRATE 50 MG: 50 TABLET ORAL at 21:05

## 2018-10-02 RX ADMIN — Medication 10 ML: at 21:06

## 2018-10-02 RX ADMIN — MORPHINE SULFATE 15 MG: 30 TABLET ORAL at 11:49

## 2018-10-02 RX ADMIN — SODIUM CHLORIDE: 9 INJECTION, SOLUTION INTRAVENOUS at 06:48

## 2018-10-02 RX ADMIN — FUROSEMIDE 20 MG: 20 TABLET ORAL at 13:11

## 2018-10-02 RX ADMIN — LEVOTHYROXINE SODIUM 25 MCG: 25 TABLET ORAL at 06:48

## 2018-10-02 RX ADMIN — MULTIPLE VITAMINS W/ MINERALS TAB 1 TABLET: TAB at 17:17

## 2018-10-02 RX ADMIN — HYDROCODONE BITARTRATE AND ACETAMINOPHEN 1 TABLET: 5; 325 TABLET ORAL at 17:17

## 2018-10-02 RX ADMIN — SODIUM CHLORIDE, PRESERVATIVE FREE 100 UNITS: 5 INJECTION INTRAVENOUS at 04:31

## 2018-10-02 RX ADMIN — POTASSIUM CHLORIDE 20 MEQ: 20 TABLET, EXTENDED RELEASE ORAL at 09:39

## 2018-10-02 RX ADMIN — POTASSIUM CHLORIDE 20 MEQ: 20 TABLET, EXTENDED RELEASE ORAL at 21:05

## 2018-10-02 RX ADMIN — METOPROLOL TARTRATE 50 MG: 50 TABLET ORAL at 09:39

## 2018-10-02 RX ADMIN — LISINOPRIL 20 MG: 20 TABLET ORAL at 21:05

## 2018-10-02 RX ADMIN — MORPHINE SULFATE 15 MG: 30 TABLET ORAL at 21:05

## 2018-10-02 RX ADMIN — HYDROCHLOROTHIAZIDE 25 MG: 25 TABLET ORAL at 09:44

## 2018-10-02 RX ADMIN — FUROSEMIDE 20 MG: 20 TABLET ORAL at 09:40

## 2018-10-02 RX ADMIN — FUROSEMIDE 20 MG: 20 TABLET ORAL at 21:05

## 2018-10-02 RX ADMIN — FAMOTIDINE 20 MG: 20 TABLET ORAL at 09:39

## 2018-10-02 RX ADMIN — METHYLPREDNISOLONE SODIUM SUCCINATE 20 MG: 40 INJECTION, POWDER, LYOPHILIZED, FOR SOLUTION INTRAMUSCULAR; INTRAVENOUS at 15:56

## 2018-10-02 RX ADMIN — TRAZODONE HYDROCHLORIDE 100 MG: 50 TABLET ORAL at 21:05

## 2018-10-02 RX ADMIN — METHYLPREDNISOLONE SODIUM SUCCINATE 20 MG: 40 INJECTION, POWDER, LYOPHILIZED, FOR SOLUTION INTRAMUSCULAR; INTRAVENOUS at 04:30

## 2018-10-02 RX ADMIN — CYCLOBENZAPRINE HYDROCHLORIDE 5 MG: 10 TABLET, FILM COATED ORAL at 15:54

## 2018-10-02 ASSESSMENT — PAIN DESCRIPTION - ORIENTATION
ORIENTATION: LOWER
ORIENTATION: RIGHT;LOWER
ORIENTATION: RIGHT;LOWER

## 2018-10-02 ASSESSMENT — PAIN DESCRIPTION - PROGRESSION
CLINICAL_PROGRESSION: NOT CHANGED
CLINICAL_PROGRESSION: NOT CHANGED
CLINICAL_PROGRESSION: GRADUALLY WORSENING

## 2018-10-02 ASSESSMENT — PAIN DESCRIPTION - PAIN TYPE
TYPE: ACUTE PAIN

## 2018-10-02 ASSESSMENT — PAIN SCALES - GENERAL
PAINLEVEL_OUTOF10: 6
PAINLEVEL_OUTOF10: 4
PAINLEVEL_OUTOF10: 0
PAINLEVEL_OUTOF10: 8
PAINLEVEL_OUTOF10: 8
PAINLEVEL_OUTOF10: 0
PAINLEVEL_OUTOF10: 9
PAINLEVEL_OUTOF10: 8

## 2018-10-02 ASSESSMENT — PAIN DESCRIPTION - DESCRIPTORS
DESCRIPTORS: CONSTANT;ACHING;DISCOMFORT
DESCRIPTORS: DISCOMFORT;DULL
DESCRIPTORS: DISCOMFORT;DULL

## 2018-10-02 ASSESSMENT — PAIN DESCRIPTION - FREQUENCY
FREQUENCY: INTERMITTENT
FREQUENCY: CONTINUOUS
FREQUENCY: CONTINUOUS

## 2018-10-02 ASSESSMENT — PAIN DESCRIPTION - LOCATION
LOCATION: ABDOMEN

## 2018-10-02 ASSESSMENT — PAIN DESCRIPTION - ONSET
ONSET: ON-GOING

## 2018-10-02 NOTE — PROGRESS NOTES
[facility] Hospitalist   Progress Note    Admitting Date and Time: 9/25/2018  3:02 PM  Admit Dx: Terminal ileitis with rectal bleeding ([facility] [K50.011]  Terminal ileitis with rectal bleeding ([facility] [K50.011]    Subjective:    Patient's colonoscopy was canceled secondary to elevated blood pressure. She denies chest pain, headache, vision or hearing changes. She was somewhat flushed. She denies any double vision, hearing changes, dysuria, hematuria, leg swelling  Pt denies fever, chills, cp, sob, n/v, or pain.  lisinopril  20 mg Oral BID    hydrochlorothiazide  25 mg Oral Daily    heparin flush  100 Units Intravenous Q12H    sodium chloride flush  10 mL Intravenous 2 times per day    morphine  15 mg Oral Q12H    furosemide  20 mg Oral TID    isosorbide mononitrate  60 mg Oral Daily    levothyroxine  25 mcg Oral Daily    metoprolol tartrate  50 mg Oral BID    therapeutic multivitamin-minerals  1 tablet Oral Dinner    potassium chloride  20 mEq Oral BID    pravastatin  80 mg Oral Daily    traZODone  100 mg Oral Nightly    famotidine  20 mg Oral Daily    beclomethasone  1 puff Inhalation BID    cyclobenzaprine  5 mg Oral Q8H    methylPREDNISolone  20 mg Intravenous Q12H       hydrALAZINE 20 mg Q6H PRN   heparin flush 100 Units PRN   sodium chloride flush 10 mL PRN   mineral oil-hydrophilic petrolatum  BID PRN   HYDROcodone 5 mg - acetaminophen 1 tablet Q6H PRN   ondansetron 4 mg Q6H PRN   magnesium hydroxide 30 mL Daily PRN   acetaminophen 650 mg Q4H PRN        Objective:    BP (!) 244/107   Pulse 85   Temp 97.7 °F (36.5 °C) (Oral)   Resp 16   Ht 5' (1.524 m)   Wt 123 lb 4.8 oz (55.9 kg)   SpO2 93%   BMI 24.08 kg/m²   Gen. : Awake, alert, no acute distress, younger than appearance  Skin: warm and dry, no rash or erythema  Pulmonary/Chest: clear to auscultation bilaterally- no wheezes, rales or rhonchi, normal air movement, no respiratory distress  Cardiovascular: Regular rate and

## 2018-10-02 NOTE — PROGRESS NOTES
Physical Therapy  Facility/Department: [facility] SURG  Treatment note  NAME: Mathieu Fung  : 1928  MRN: [id number]    Date of Service: 10/2/2018    Evaluating Therapist: Lynne Junior PT     Room #: 531   DIAGNOSIS:  Terminal ileitis with rectal bleeding   PRECAUTIONS: falls, waffle cushion     Social:  Pt lives with  Daughter  in a  1  floor plan  2  steps and  1  rails to enter. Prior to admission pt walked with ww  Pt lives in [facility], is here visiting      Initial Evaluation  Date:  18 Treatment    Short Term/ Long Term   Goals   Was pt agreeable to Eval/treatment? yes  yes    Does pt have pain?  none reported  No complaints    Bed Mobility  Rolling:  Min assist   Supine to sit:  Min assist   Sit to supine:  NT   Scooting:  Min assist in sit  NA, pt was found and left sitting up in chair  SBA    Transfers Sit to stand:  Min assist   Stand to sit: min assist   Stand pivot: min assist Sit to stand: MIN A  Stand to sit: MIN A  Stand Pivot: MIN A with ww SBA    Ambulation    15 feet x 1 and 60 feet x 1  with  ww  with  CGA/min assist  15 +150 feet with ww and MIN A  100 feet with  ww  with  SBA , no LOB    Stair negotiation: ascended and descended NT  NT, pt fatigued with amb  2-4  steps with  1 rail with  CGA    AM- PAC RAW score         BLE ROM is WFL  BLE Strength is grossly 4-/5 to 4/5  Balance: sitting on commode supervision and standing with ww SBA/MIN A    Patient education  Pt was educated on hand placement during transfers. Patient response to education:   Pt verbalized understanding Pt demonstrated skill Pt requires further education in this area   yes yes yes     Additional Comments: Pt walked with ww into BR and transferred on/off commode with MIN A/MOD A. LIPSCOMB assisted pt with hygiene care and washing her hands while standing at the sink. Pt then walked in the stewart with ww and had no LOB or SOB, but was fatigued which limited her distance.       Pt was left sitting up in

## 2018-10-02 NOTE — PROGRESS NOTES
Occupational Therapy  OCCUPATIONAL THERAPY DAILY NOTE    Date:10/2/2018  Patient Name: Zenobia Cruz  MRN: [id number]  : 1928  Room: [address] [place] [address]     Patient Active Problem List   Diagnosis    Generalized abdominal pain    Atrial fibrillation ([facility]    CAD (coronary artery disease)    Pacemaker    Chronic anticoagulation    Atrial tachycardia ([facility]    Chest pain    Valvular heart disease    Fall    Acute systolic CHF (congestive heart failure) [facility]    Coagulopathy ([facility])    Terminal ileitis with rectal bleeding ([facility])    BRBPR (bright red blood per rectum)    Diarrhea    Nausea and vomiting    Supratherapeutic PT INR    Essential hypertension    Chronic diastolic CHF (congestive heart failure) (HCC)    CKD (chronic kidney disease)    Hyperlipidemia    Hypothyroidism    Abnormal chest sounds - suspect LLL pneumonia    Moderate protein-calorie malnutrition ([facility])    Acidosis    Anemia associated with acute blood loss       Subjective:  Pt sitting in the chair. Daughters present. Precautions: falls  Chart Reviewed:  Yes          Independent Supervision Contact Guard Assist Minimal Assist Moderate Assist Maximum Assist Dependent   Feeding          Grooming    x      UE  Bathing          LE Bathing          UE Dressing          LE  Dressing                    Toileting             x      Comments:  Pt with slight incontinence when walking to the bathroom. Pt able to complete toilet hygiene independently. Min A to arrange clothing. Min A for balance during ADL activity. Pt stood at the sink to complete grooming with min A. Functional Transfers:  Transfer from chair and toilet surfaces min A. Cues for hand placement to increase safety and technique. Functional mobility using wheeled walker min A. Pt declined to return to bed. Requesting to remain in chair. Daughters present. Therapeutic Exercises:  Fair use of UE's during functional activity. Other:  Fatigue with activity.

## 2018-10-02 NOTE — PROGRESS NOTES
30.6*   RDW  14.9   PLT  213   MPV  9.9       PT/INR:    Lab Results   Component Value Date    PROTIME 31.8 10/01/2018    PROTIME 27.4 05/25/2012    INR 2.8 10/01/2018        Radiology:   CT ABDOMEN PELVIS WO CONTRAST Additional Contrast? Oral   Final Result   There are multiple diverticula seen    Thickening of the terminal ileum and the cecum, combination raises the   possibility of Crohn's disease   Mildly dilated loops of small bowel suspicious for partial small bowel   obstruction, improved from the previous study                                 XR Acute Abd Series Chest 1 VW   Final Result      Cannot excluded mild degree of ileus. There is however no pattern for   obstruction or constipation. Please correlate clinically. XR CHEST STANDARD (2 VW)   Final Result   Left-sided pleural effusion with associated left lower   lobe airspace opacity. Recommend clinical correlation. CT ABDOMEN PELVIS WO CONTRAST   Final Result      1. The terminal ileum appears thickened for a short segment at the   ileocecal junction, and proximal to this, there is prominent fluid   distention of ileum throughout, possibly indicating a functional   outflow obstruction versus ileus related to terminal ileal   inflammatory change. Small bowel measures up to 3.9 cm in the abdomen   elsewhere. A small amount of extraluminal fluid is noted in the right   lower abdomen, likely extending from the distal ileum. 2. The colon shows diffuse diverticulosis coli of the descending and   pelvic colonic segments without acute complications. 3. At present, there is no evidence of a perforated viscus. A small   amount of perihepatic fluid is identified. 4. There is very prominent cardiomegaly without evidence of acute   decompensation. 5. There is a trace right-sided pleural effusion, and density in the   retrocardiac left lower lobe is suspicious for early interstitial   pneumonia.

## 2018-10-03 ENCOUNTER — ANESTHESIA (OUTPATIENT)
Dept: ENDOSCOPY | Age: 83
DRG: 330 | End: 2018-10-03
Payer: MEDICARE

## 2018-10-03 ENCOUNTER — ANESTHESIA EVENT (OUTPATIENT)
Dept: ENDOSCOPY | Age: 83
DRG: 330 | End: 2018-10-03
Payer: MEDICARE

## 2018-10-03 ENCOUNTER — APPOINTMENT (OUTPATIENT)
Dept: GENERAL RADIOLOGY | Age: 83
DRG: 330 | End: 2018-10-03
Payer: MEDICARE

## 2018-10-03 VITALS
RESPIRATION RATE: 18 BRPM | SYSTOLIC BLOOD PRESSURE: 164 MMHG | OXYGEN SATURATION: 100 % | DIASTOLIC BLOOD PRESSURE: 69 MMHG

## 2018-10-03 LAB
ANION GAP SERPL CALCULATED.3IONS-SCNC: 8 MMOL/L (ref 7–16)
BUN BLDV-MCNC: 22 MG/DL (ref 8–23)
CALCIUM SERPL-MCNC: 8.9 MG/DL (ref 8.6–10.2)
CHLORIDE BLD-SCNC: 92 MMOL/L (ref 98–107)
CO2: 34 MMOL/L (ref 22–29)
CREAT SERPL-MCNC: 0.8 MG/DL (ref 0.5–1)
GFR AFRICAN AMERICAN: >60
GFR NON-AFRICAN AMERICAN: >60 ML/MIN/1.73
GLUCOSE BLD-MCNC: 123 MG/DL (ref 74–109)
INR BLD: 2
POTASSIUM SERPL-SCNC: 4.1 MMOL/L (ref 3.5–5)
PROTHROMBIN TIME: 22.9 SEC (ref 9.3–12.4)
SODIUM BLD-SCNC: 134 MMOL/L (ref 132–146)

## 2018-10-03 PROCEDURE — 2709999900 HC NON-CHARGEABLE SUPPLY: Performed by: INTERNAL MEDICINE

## 2018-10-03 PROCEDURE — 80048 BASIC METABOLIC PNL TOTAL CA: CPT

## 2018-10-03 PROCEDURE — 6360000002 HC RX W HCPCS: Performed by: FAMILY MEDICINE

## 2018-10-03 PROCEDURE — 2700000000 HC OXYGEN THERAPY PER DAY

## 2018-10-03 PROCEDURE — 0DBH8ZX EXCISION OF CECUM, VIA NATURAL OR ARTIFICIAL OPENING ENDOSCOPIC, DIAGNOSTIC: ICD-10-PCS | Performed by: INTERNAL MEDICINE

## 2018-10-03 PROCEDURE — 97110 THERAPEUTIC EXERCISES: CPT

## 2018-10-03 PROCEDURE — 7100000010 HC PHASE II RECOVERY - FIRST 15 MIN: Performed by: INTERNAL MEDICINE

## 2018-10-03 PROCEDURE — 94640 AIRWAY INHALATION TREATMENT: CPT

## 2018-10-03 PROCEDURE — 2580000003 HC RX 258: Performed by: NURSE ANESTHETIST, CERTIFIED REGISTERED

## 2018-10-03 PROCEDURE — 71045 X-RAY EXAM CHEST 1 VIEW: CPT

## 2018-10-03 PROCEDURE — 6370000000 HC RX 637 (ALT 250 FOR IP): Performed by: HOSPITALIST

## 2018-10-03 PROCEDURE — 2580000003 HC RX 258: Performed by: FAMILY MEDICINE

## 2018-10-03 PROCEDURE — 6360000002 HC RX W HCPCS: Performed by: INTERNAL MEDICINE

## 2018-10-03 PROCEDURE — APPSS30 APP SPLIT SHARED TIME 16-30 MINUTES: Performed by: NURSE PRACTITIONER

## 2018-10-03 PROCEDURE — 2580000003 HC RX 258: Performed by: INTERNAL MEDICINE

## 2018-10-03 PROCEDURE — 7100000011 HC PHASE II RECOVERY - ADDTL 15 MIN: Performed by: INTERNAL MEDICINE

## 2018-10-03 PROCEDURE — 6360000002 HC RX W HCPCS: Performed by: NURSE ANESTHETIST, CERTIFIED REGISTERED

## 2018-10-03 PROCEDURE — 6370000000 HC RX 637 (ALT 250 FOR IP): Performed by: INTERNAL MEDICINE

## 2018-10-03 PROCEDURE — 3700000000 HC ANESTHESIA ATTENDED CARE: Performed by: INTERNAL MEDICINE

## 2018-10-03 PROCEDURE — 88305 TISSUE EXAM BY PATHOLOGIST: CPT

## 2018-10-03 PROCEDURE — 99232 SBSQ HOSP IP/OBS MODERATE 35: CPT | Performed by: INTERNAL MEDICINE

## 2018-10-03 PROCEDURE — 3700000001 HC ADD 15 MINUTES (ANESTHESIA): Performed by: INTERNAL MEDICINE

## 2018-10-03 PROCEDURE — 36415 COLL VENOUS BLD VENIPUNCTURE: CPT

## 2018-10-03 PROCEDURE — 3609010300 HC COLONOSCOPY W/BIOPSY SINGLE/MULTIPLE: Performed by: INTERNAL MEDICINE

## 2018-10-03 PROCEDURE — 6370000000 HC RX 637 (ALT 250 FOR IP): Performed by: NURSE PRACTITIONER

## 2018-10-03 PROCEDURE — 1200000000 HC SEMI PRIVATE

## 2018-10-03 PROCEDURE — 85610 PROTHROMBIN TIME: CPT

## 2018-10-03 RX ORDER — SODIUM CHLORIDE 9 MG/ML
INJECTION, SOLUTION INTRAVENOUS CONTINUOUS PRN
Status: DISCONTINUED | OUTPATIENT
Start: 2018-10-03 | End: 2018-10-03 | Stop reason: SDUPTHER

## 2018-10-03 RX ORDER — PROPOFOL 10 MG/ML
INJECTION, EMULSION INTRAVENOUS PRN
Status: DISCONTINUED | OUTPATIENT
Start: 2018-10-03 | End: 2018-10-03 | Stop reason: SDUPTHER

## 2018-10-03 RX ORDER — PHYTONADIONE 5 MG/1
5 TABLET ORAL ONCE
Status: COMPLETED | OUTPATIENT
Start: 2018-10-03 | End: 2018-10-03

## 2018-10-03 RX ADMIN — FUROSEMIDE 20 MG: 20 TABLET ORAL at 09:15

## 2018-10-03 RX ADMIN — Medication 10 ML: at 09:14

## 2018-10-03 RX ADMIN — MORPHINE SULFATE 15 MG: 30 TABLET ORAL at 09:17

## 2018-10-03 RX ADMIN — ISOSORBIDE MONONITRATE 60 MG: 60 TABLET, EXTENDED RELEASE ORAL at 09:15

## 2018-10-03 RX ADMIN — PETROLATUM: 42 OINTMENT TOPICAL at 04:07

## 2018-10-03 RX ADMIN — LISINOPRIL 20 MG: 20 TABLET ORAL at 09:15

## 2018-10-03 RX ADMIN — FUROSEMIDE 20 MG: 20 TABLET ORAL at 14:39

## 2018-10-03 RX ADMIN — PRAVASTATIN SODIUM 80 MG: 20 TABLET ORAL at 09:14

## 2018-10-03 RX ADMIN — POTASSIUM CHLORIDE 20 MEQ: 20 TABLET, EXTENDED RELEASE ORAL at 22:00

## 2018-10-03 RX ADMIN — POTASSIUM CHLORIDE 20 MEQ: 20 TABLET, EXTENDED RELEASE ORAL at 09:14

## 2018-10-03 RX ADMIN — TRAZODONE HYDROCHLORIDE 100 MG: 50 TABLET ORAL at 22:00

## 2018-10-03 RX ADMIN — LEVOTHYROXINE SODIUM 25 MCG: 25 TABLET ORAL at 09:15

## 2018-10-03 RX ADMIN — PROPOFOL 20 MG: 10 INJECTION, EMULSION INTRAVENOUS at 07:09

## 2018-10-03 RX ADMIN — METHYLPREDNISOLONE SODIUM SUCCINATE 20 MG: 40 INJECTION, POWDER, LYOPHILIZED, FOR SOLUTION INTRAMUSCULAR; INTRAVENOUS at 04:07

## 2018-10-03 RX ADMIN — LISINOPRIL 20 MG: 20 TABLET ORAL at 22:01

## 2018-10-03 RX ADMIN — PROPOFOL 50 MG: 10 INJECTION, EMULSION INTRAVENOUS at 07:00

## 2018-10-03 RX ADMIN — CYCLOBENZAPRINE HYDROCHLORIDE 5 MG: 10 TABLET, FILM COATED ORAL at 23:46

## 2018-10-03 RX ADMIN — CYCLOBENZAPRINE HYDROCHLORIDE 5 MG: 10 TABLET, FILM COATED ORAL at 00:07

## 2018-10-03 RX ADMIN — MORPHINE SULFATE 15 MG: 30 TABLET ORAL at 22:00

## 2018-10-03 RX ADMIN — FUROSEMIDE 20 MG: 20 TABLET ORAL at 22:00

## 2018-10-03 RX ADMIN — HYDROCODONE BITARTRATE AND ACETAMINOPHEN 1 TABLET: 5; 325 TABLET ORAL at 14:40

## 2018-10-03 RX ADMIN — BECLOMETHASONE DIPROPIONATE HFA 1 PUFF: 40 AEROSOL, METERED RESPIRATORY (INHALATION) at 20:31

## 2018-10-03 RX ADMIN — FAMOTIDINE 20 MG: 20 TABLET ORAL at 09:17

## 2018-10-03 RX ADMIN — Medication 10 ML: at 04:07

## 2018-10-03 RX ADMIN — PHYTONADIONE 5 MG: 5 TABLET ORAL at 18:56

## 2018-10-03 RX ADMIN — METOPROLOL TARTRATE 50 MG: 50 TABLET ORAL at 22:00

## 2018-10-03 RX ADMIN — METOPROLOL TARTRATE 50 MG: 50 TABLET ORAL at 09:15

## 2018-10-03 RX ADMIN — PROPOFOL 10 MG: 10 INJECTION, EMULSION INTRAVENOUS at 07:04

## 2018-10-03 RX ADMIN — HYDROCHLOROTHIAZIDE 25 MG: 25 TABLET ORAL at 09:15

## 2018-10-03 RX ADMIN — SODIUM CHLORIDE, PRESERVATIVE FREE 100 UNITS: 5 INJECTION INTRAVENOUS at 17:45

## 2018-10-03 RX ADMIN — SODIUM CHLORIDE: 9 INJECTION, SOLUTION INTRAVENOUS at 03:29

## 2018-10-03 RX ADMIN — CYCLOBENZAPRINE HYDROCHLORIDE 5 MG: 10 TABLET, FILM COATED ORAL at 17:45

## 2018-10-03 RX ADMIN — SODIUM CHLORIDE: 9 INJECTION, SOLUTION INTRAVENOUS at 06:52

## 2018-10-03 RX ADMIN — MULTIPLE VITAMINS W/ MINERALS TAB 1 TABLET: TAB at 17:45

## 2018-10-03 ASSESSMENT — PAIN SCALES - GENERAL
PAINLEVEL_OUTOF10: 0
PAINLEVEL_OUTOF10: 7
PAINLEVEL_OUTOF10: 8

## 2018-10-03 ASSESSMENT — PAIN DESCRIPTION - FREQUENCY
FREQUENCY: INTERMITTENT
FREQUENCY: CONTINUOUS

## 2018-10-03 ASSESSMENT — PAIN DESCRIPTION - PAIN TYPE
TYPE: ACUTE PAIN
TYPE: ACUTE PAIN

## 2018-10-03 ASSESSMENT — PAIN DESCRIPTION - ONSET
ONSET: ON-GOING
ONSET: ON-GOING

## 2018-10-03 ASSESSMENT — PAIN DESCRIPTION - ORIENTATION
ORIENTATION: RIGHT;LOWER
ORIENTATION: RIGHT;LEFT

## 2018-10-03 ASSESSMENT — PAIN DESCRIPTION - LOCATION
LOCATION: ABDOMEN
LOCATION: ABDOMEN

## 2018-10-03 ASSESSMENT — PAIN DESCRIPTION - DESCRIPTORS
DESCRIPTORS: ACHING;CONSTANT;DISCOMFORT
DESCRIPTORS: SHARP;SHOOTING

## 2018-10-03 ASSESSMENT — PAIN DESCRIPTION - PROGRESSION
CLINICAL_PROGRESSION: NOT CHANGED
CLINICAL_PROGRESSION: NOT CHANGED

## 2018-10-03 ASSESSMENT — ENCOUNTER SYMPTOMS: SHORTNESS OF BREATH: 0

## 2018-10-03 NOTE — PROGRESS NOTES
obstruction or constipation. Please correlate clinically. XR CHEST STANDARD (2 VW)   Final Result   Left-sided pleural effusion with associated left lower   lobe airspace opacity. Recommend clinical correlation. CT ABDOMEN PELVIS WO CONTRAST   Final Result      1. The terminal ileum appears thickened for a short segment at the   ileocecal junction, and proximal to this, there is prominent fluid   distention of ileum throughout, possibly indicating a functional   outflow obstruction versus ileus related to terminal ileal   inflammatory change. Small bowel measures up to 3.9 cm in the abdomen   elsewhere. A small amount of extraluminal fluid is noted in the right   lower abdomen, likely extending from the distal ileum. 2. The colon shows diffuse diverticulosis coli of the descending and   pelvic colonic segments without acute complications. 3. At present, there is no evidence of a perforated viscus. A small   amount of perihepatic fluid is identified. 4. There is very prominent cardiomegaly without evidence of acute   decompensation. 5. There is a trace right-sided pleural effusion, and density in the   retrocardiac left lower lobe is suspicious for early interstitial   pneumonia.                             Assessment:    Principal Problem:    Terminal ileitis with rectal bleeding (HCC)  Active Problems:    BRBPR (bright red blood per rectum)    Nausea and vomiting    Supratherapeutic PT INR    Abnormal chest sounds - suspect LLL pneumonia    Essential hypertension    Chronic diastolic CHF (congestive heart failure) (HCC)    CKD (chronic kidney disease)    Generalized abdominal pain    Atrial fibrillation (HCC)    CAD (coronary artery disease)    Pacemaker    Chronic anticoagulation    Valvular heart disease    Diarrhea    Hyperlipidemia    Hypothyroidism    Moderate protein-calorie malnutrition (HCC)    Acidosis    Anemia associated with acute blood loss  Resolved Problems:    * No resolved

## 2018-10-04 ENCOUNTER — ANESTHESIA EVENT (OUTPATIENT)
Dept: OPERATING ROOM | Age: 83
DRG: 330 | End: 2018-10-04
Payer: MEDICARE

## 2018-10-04 LAB
ANION GAP SERPL CALCULATED.3IONS-SCNC: 9 MMOL/L (ref 7–16)
APTT: 44.2 SEC (ref 24.5–35.1)
APTT: <20 SEC (ref 24.5–35.1)
BUN BLDV-MCNC: 34 MG/DL (ref 8–23)
CALCIUM SERPL-MCNC: 8.2 MG/DL (ref 8.6–10.2)
CHLORIDE BLD-SCNC: 92 MMOL/L (ref 98–107)
CO2: 31 MMOL/L (ref 22–29)
CREAT SERPL-MCNC: 1.1 MG/DL (ref 0.5–1)
GFR AFRICAN AMERICAN: 56
GFR NON-AFRICAN AMERICAN: 47 ML/MIN/1.73
GLUCOSE BLD-MCNC: 119 MG/DL (ref 74–109)
INR BLD: 1.3
POTASSIUM SERPL-SCNC: 3.7 MMOL/L (ref 3.5–5)
PROTHROMBIN TIME: 15.1 SEC (ref 9.3–12.4)
SODIUM BLD-SCNC: 132 MMOL/L (ref 132–146)

## 2018-10-04 PROCEDURE — 6370000000 HC RX 637 (ALT 250 FOR IP): Performed by: HOSPITALIST

## 2018-10-04 PROCEDURE — 6370000000 HC RX 637 (ALT 250 FOR IP): Performed by: STUDENT IN AN ORGANIZED HEALTH CARE EDUCATION/TRAINING PROGRAM

## 2018-10-04 PROCEDURE — 97530 THERAPEUTIC ACTIVITIES: CPT

## 2018-10-04 PROCEDURE — 36415 COLL VENOUS BLD VENIPUNCTURE: CPT

## 2018-10-04 PROCEDURE — 6370000000 HC RX 637 (ALT 250 FOR IP): Performed by: NURSE PRACTITIONER

## 2018-10-04 PROCEDURE — 6360000002 HC RX W HCPCS: Performed by: INTERNAL MEDICINE

## 2018-10-04 PROCEDURE — 85730 THROMBOPLASTIN TIME PARTIAL: CPT

## 2018-10-04 PROCEDURE — 6370000000 HC RX 637 (ALT 250 FOR IP): Performed by: INTERNAL MEDICINE

## 2018-10-04 PROCEDURE — 1200000000 HC SEMI PRIVATE

## 2018-10-04 PROCEDURE — 2580000003 HC RX 258: Performed by: INTERNAL MEDICINE

## 2018-10-04 PROCEDURE — 85610 PROTHROMBIN TIME: CPT

## 2018-10-04 PROCEDURE — 99232 SBSQ HOSP IP/OBS MODERATE 35: CPT | Performed by: INTERNAL MEDICINE

## 2018-10-04 PROCEDURE — 80048 BASIC METABOLIC PNL TOTAL CA: CPT

## 2018-10-04 PROCEDURE — 94640 AIRWAY INHALATION TREATMENT: CPT

## 2018-10-04 RX ORDER — HEPARIN SODIUM 1000 [USP'U]/ML
60 INJECTION, SOLUTION INTRAVENOUS; SUBCUTANEOUS PRN
Status: DISCONTINUED | OUTPATIENT
Start: 2018-10-04 | End: 2018-10-05

## 2018-10-04 RX ORDER — HEPARIN SODIUM 10000 [USP'U]/100ML
12 INJECTION, SOLUTION INTRAVENOUS CONTINUOUS
Status: DISCONTINUED | OUTPATIENT
Start: 2018-10-04 | End: 2018-10-05

## 2018-10-04 RX ORDER — HEPARIN SODIUM 1000 [USP'U]/ML
30 INJECTION, SOLUTION INTRAVENOUS; SUBCUTANEOUS PRN
Status: DISCONTINUED | OUTPATIENT
Start: 2018-10-04 | End: 2018-10-05

## 2018-10-04 RX ADMIN — LEVOTHYROXINE SODIUM 25 MCG: 25 TABLET ORAL at 06:23

## 2018-10-04 RX ADMIN — TRAZODONE HYDROCHLORIDE 100 MG: 50 TABLET ORAL at 21:31

## 2018-10-04 RX ADMIN — FAMOTIDINE 20 MG: 20 TABLET ORAL at 09:08

## 2018-10-04 RX ADMIN — MULTIPLE VITAMINS W/ MINERALS TAB 1 TABLET: TAB at 16:06

## 2018-10-04 RX ADMIN — LISINOPRIL 20 MG: 20 TABLET ORAL at 21:31

## 2018-10-04 RX ADMIN — METOPROLOL TARTRATE 50 MG: 50 TABLET ORAL at 21:31

## 2018-10-04 RX ADMIN — ISOSORBIDE MONONITRATE 60 MG: 60 TABLET, EXTENDED RELEASE ORAL at 09:09

## 2018-10-04 RX ADMIN — HYDROCHLOROTHIAZIDE 25 MG: 25 TABLET ORAL at 09:10

## 2018-10-04 RX ADMIN — HYDROCODONE BITARTRATE AND ACETAMINOPHEN 1 TABLET: 5; 325 TABLET ORAL at 18:45

## 2018-10-04 RX ADMIN — BECLOMETHASONE DIPROPIONATE HFA 1 PUFF: 40 AEROSOL, METERED RESPIRATORY (INHALATION) at 08:37

## 2018-10-04 RX ADMIN — METOPROLOL TARTRATE 50 MG: 50 TABLET ORAL at 09:11

## 2018-10-04 RX ADMIN — CYCLOBENZAPRINE HYDROCHLORIDE 5 MG: 10 TABLET, FILM COATED ORAL at 09:10

## 2018-10-04 RX ADMIN — PETROLATUM: 42 OINTMENT TOPICAL at 09:11

## 2018-10-04 RX ADMIN — SODIUM CHLORIDE: 9 INJECTION, SOLUTION INTRAVENOUS at 16:11

## 2018-10-04 RX ADMIN — HEPARIN SODIUM 1680 UNITS: 1000 INJECTION, SOLUTION INTRAVENOUS; SUBCUTANEOUS at 18:44

## 2018-10-04 RX ADMIN — MORPHINE SULFATE 15 MG: 30 TABLET ORAL at 09:09

## 2018-10-04 RX ADMIN — FUROSEMIDE 20 MG: 20 TABLET ORAL at 14:35

## 2018-10-04 RX ADMIN — HEPARIN SODIUM AND DEXTROSE 12 UNITS/KG/HR: 10000; 5 INJECTION INTRAVENOUS at 11:26

## 2018-10-04 RX ADMIN — POLYETHYLENE GLYCOL-3350 AND ELECTROLYTES 4000 ML: 236; 6.74; 5.86; 2.97; 22.74 POWDER, FOR SOLUTION ORAL at 16:09

## 2018-10-04 RX ADMIN — LISINOPRIL 20 MG: 20 TABLET ORAL at 09:10

## 2018-10-04 RX ADMIN — HYDROCODONE BITARTRATE AND ACETAMINOPHEN 1 TABLET: 5; 325 TABLET ORAL at 10:12

## 2018-10-04 RX ADMIN — FUROSEMIDE 20 MG: 20 TABLET ORAL at 21:31

## 2018-10-04 RX ADMIN — FUROSEMIDE 20 MG: 20 TABLET ORAL at 09:09

## 2018-10-04 RX ADMIN — POTASSIUM CHLORIDE 20 MEQ: 20 TABLET, EXTENDED RELEASE ORAL at 21:31

## 2018-10-04 RX ADMIN — PRAVASTATIN SODIUM 80 MG: 20 TABLET ORAL at 09:08

## 2018-10-04 RX ADMIN — CYCLOBENZAPRINE HYDROCHLORIDE 5 MG: 10 TABLET, FILM COATED ORAL at 16:06

## 2018-10-04 RX ADMIN — POTASSIUM CHLORIDE 20 MEQ: 20 TABLET, EXTENDED RELEASE ORAL at 09:10

## 2018-10-04 RX ADMIN — MORPHINE SULFATE 15 MG: 30 TABLET ORAL at 21:31

## 2018-10-04 ASSESSMENT — PAIN DESCRIPTION - PAIN TYPE
TYPE: ACUTE PAIN
TYPE: CHRONIC PAIN
TYPE: CHRONIC PAIN

## 2018-10-04 ASSESSMENT — PAIN SCALES - GENERAL
PAINLEVEL_OUTOF10: 8
PAINLEVEL_OUTOF10: 0
PAINLEVEL_OUTOF10: 8
PAINLEVEL_OUTOF10: 4
PAINLEVEL_OUTOF10: 6
PAINLEVEL_OUTOF10: 7
PAINLEVEL_OUTOF10: 8

## 2018-10-04 ASSESSMENT — PAIN DESCRIPTION - DESCRIPTORS
DESCRIPTORS: HEADACHE
DESCRIPTORS: CONSTANT;ACHING;DISCOMFORT
DESCRIPTORS: CONSTANT;ACHING;DISCOMFORT

## 2018-10-04 ASSESSMENT — PAIN DESCRIPTION - ONSET
ONSET: ON-GOING

## 2018-10-04 ASSESSMENT — PAIN DESCRIPTION - PROGRESSION
CLINICAL_PROGRESSION: NOT CHANGED
CLINICAL_PROGRESSION: NOT CHANGED

## 2018-10-04 ASSESSMENT — ENCOUNTER SYMPTOMS: SHORTNESS OF BREATH: 0

## 2018-10-04 ASSESSMENT — PAIN DESCRIPTION - LOCATION
LOCATION: ABDOMEN
LOCATION: HEAD
LOCATION: ABDOMEN

## 2018-10-04 ASSESSMENT — PAIN DESCRIPTION - ORIENTATION
ORIENTATION: LEFT;RIGHT
ORIENTATION: LEFT;RIGHT

## 2018-10-04 ASSESSMENT — PAIN DESCRIPTION - FREQUENCY
FREQUENCY: CONTINUOUS

## 2018-10-04 NOTE — PROGRESS NOTES
Dr. Marlys Art examined pt this morning for pre-op clearance. Pt is cleared from cardiology standpoint.  Dr. Marlys Art requested we obtain echo and cath results from [place] [facility]      Krupa Magallanes   Phone number [phone]  Fax number [phone]

## 2018-10-04 NOTE — CONSULTS
levothyroxine (SYNTHROID) 25 MCG tablet Take 25 mcg by mouth Daily. Yes Historical Provider, MD   warfarin (COUMADIN) 5 MG tablet Take 5 mg by mouth Six times weekly Every day except fridays   Yes Historical Provider, MD   warfarin (COUMADIN) 2.5 MG tablet Take 2.5 mg by mouth once a week Fridays   Yes Historical Provider, MD   pravastatin (PRAVACHOL) 80 MG tablet Take 80 mg by mouth daily. Yes Historical Provider, MD   CRANBERRY PO Take 475 mg by mouth Daily with supper    Yes Historical Provider, MD   nitroGLYCERIN (NITROSTAT) 0.4 MG SL tablet Place 0.4 mg under the tongue every 5 minutes as needed. Yes Historical Provider, MD   furosemide (LASIX) 20 MG tablet Take 20 mg by mouth 3 times daily    Yes Historical Provider, MD   potassium chloride SA (K-DUR;KLOR-CON) 20 MEQ tablet Take 20 mEq by mouth 2 times daily. Yes Historical Provider, MD       Allergies:  Sulfa antibiotics; Adhesive tape; Bactrim; and Pcn [penicillins]     Review of Systems:   · Constitutional: there has been weight loss over the past 6 months. · Eyes: No visual changes or diplopia. No scleral icterus. · ENT: No Headaches, hearing loss or vertigo. No mouth sores or sore throat. · Cardiovascular: No chest pain, dyspnea on exertion, palpitations or loss of consciousness. No cough, hemoptysis, pleuritic pain, or phlebitis. · Respiratory: No cough or wheezing, no sputum production. · Gastrointestinal: Positive for blood in stools, constipation and diarrhea. No hematemesis. · Genitourinary: No dysuria, trouble voiding, or hematuria. No change in bowel or bladder habits. · Musculoskeletal:  No gait disturbance, weakness or joint complaints. · Integumentary: No rash or pruritis. · Neurological: No headache, diplopia, change in muscle strength, numbness or tingling. No change in gait, balance, coordination, mood, affect, memory, mentation, behavior. · Psychiatric: No anxiety, or depression.   · Endocrine: No temperature coronary and aortic root calcification is documented. Multipolar pacemaker leads are documented. There is no evidence of acute cardiac decompensation. Eli Dinh LIVER: The liver is slightly diminished in size without focal parenchymal abnormalities. There is some perihepatic moderate density fluid suggesting relatively high protein content. GALLBLADDER AND BILIARY TREE: No evidence of acute inflammation, gallstones, or biliary ductal ectasia. SPLEEN: Normal ADRENALS:  Normal bilaterally PANCREAS: No evidence of acute inflammation. Fatty atrophy is noted. KIDNEYS/BLADDER: Noncontrast imaging shows limited visceral detail. There is some cortical atrophy, greater on the left. There is asymmetric prominence of the right renal pelvis and intrarenal collecting system, but ureters are similar in course and caliber. Bladder contours are normal. APPENDIX:  Surgically removed BOWEL:  Prominent fluid-filled distal ileal loops are noted throughout the abdomen, suggesting ileus versus distal partial small bowel obstruction. The terminal ileum is not distended with fluid the colon is likewise not distended. Diverticular involvement of the sigmoid colon is prominent throughout. There is no evidence of acute inflammatory complications, however. PELVIS: There is no dependent free pelvic fluid or intrapelvic adenopathy. The patient is post hysterectomy. LYMPHATICS: There is no mesenteric or retroperitoneal adenopathy. VASCULAR: There is dense and extensive atherosclerotic arterial calcification. SKELETAL: There is severe levo and levorotatory scoliotic curvature of the lumbar spine with the apex at the L3 level. 1. The terminal ileum appears thickened for a short segment at the ileocecal junction, and proximal to this, there is prominent fluid distention of ileum throughout, possibly indicating a functional outflow obstruction versus ileus related to terminal ileal inflammatory change.  Small bowel measures up to 3.9 cm in the abdomen during the perioperative period. Would also avoid fluid shifts perioperative period as well and ambulating the results of her two-dimensional echocardiogram she notes that she had within the past 6 months while residing in [place]. Should there be any cardiovascular complications I will make myself available on your request.    I have spent greater than 45 minutes face to face with Julieta Perla, with greater than 50% of this time instructing and counseling the patient and her daughter regarding my findings and recommendations and I have answered all questions as posed to me by Ms. Dionne Perez and her daughter who is present at her bedside. . Thank you, Mer Mackey, David Modi and Tory Rubalcava M.D. for allowing me to consult in the care of this patient. Elena Bales DO, FACP, [facility]    NOTE:  This report was transcribed using voice recognition software. Every effort was made to ensure accuracy; however, inadvertent computerized transcription errors may be present.

## 2018-10-04 NOTE — PROGRESS NOTES
was left in a bedside chair with call light in reach. Treatment time: 19 minutes  Time out: 1007    Pt is showing declining progress toward established Physical Therapy goals, unable to gait as far, required increased assist for balance, fatigues quickly and very deconditioned. Continue with physical therapy current plan of care.     [facility]   License Number: [id number]

## 2018-10-04 NOTE — PROGRESS NOTES
Armaan Neri Hospitalist   Progress Note    Admitting Date and Time: 9/25/2018  3:02 PM  Admit Dx: Terminal ileitis with rectal bleeding ([facility] [address] [K50.011]  Terminal ileitis with rectal bleeding ([facility] [address] [K50.011]    Subjective:    Pt feels better, No issues over night, No new complains  Per RN: Patient waiting for surgery-no needs. ROS: denies fever, chills, cp, sob, n/v, HA unless stated above.      polyethylene glycol  4,000 mL Oral Once    [START ON 10/5/2018] ceFAZolin  2 g Intravenous On Call to OR    [START ON 10/5/2018] metroNIDAZOLE  500 mg Intravenous On Call to OR    lisinopril  20 mg Oral BID    hydrochlorothiazide  25 mg Oral Daily    heparin flush  100 Units Intravenous Q12H    sodium chloride flush  10 mL Intravenous 2 times per day    morphine  15 mg Oral Q12H    furosemide  20 mg Oral TID    isosorbide mononitrate  60 mg Oral Daily    levothyroxine  25 mcg Oral Daily    metoprolol tartrate  50 mg Oral BID    therapeutic multivitamin-minerals  1 tablet Oral Dinner    potassium chloride  20 mEq Oral BID    pravastatin  80 mg Oral Daily    traZODone  100 mg Oral Nightly    famotidine  20 mg Oral Daily    beclomethasone  1 puff Inhalation BID    cyclobenzaprine  5 mg Oral Q8H       hydrALAZINE 20 mg Q6H PRN   heparin flush 100 Units PRN   sodium chloride flush 10 mL PRN   mineral oil-hydrophilic petrolatum  BID PRN   HYDROcodone 5 mg - acetaminophen 1 tablet Q6H PRN   ondansetron 4 mg Q6H PRN   magnesium hydroxide 30 mL Daily PRN   acetaminophen 650 mg Q4H PRN        Objective:    BP (!) 113/55   Pulse 64   Temp 98.1 °F (36.7 °C) (Oral)   Resp 16   Ht 5' (1.524 m)   Wt 123 lb 4.8 oz (55.9 kg)   SpO2 95%   BMI 24.08 kg/m²   General Appearance: alert and oriented to person, place and time, well-developed and well-nourished, in no acute distress  Skin: warm and dry, no rash or erythema  Head: normocephalic and atraumatic  Eyes: pupils equal, round, and reactive to

## 2018-10-05 ENCOUNTER — ANESTHESIA (OUTPATIENT)
Dept: OPERATING ROOM | Age: 83
DRG: 330 | End: 2018-10-05
Payer: MEDICARE

## 2018-10-05 VITALS — TEMPERATURE: 97.2 F | OXYGEN SATURATION: 100 % | DIASTOLIC BLOOD PRESSURE: 73 MMHG | SYSTOLIC BLOOD PRESSURE: 179 MMHG

## 2018-10-05 LAB
ABO/RH: NORMAL
ANION GAP SERPL CALCULATED.3IONS-SCNC: 9 MMOL/L (ref 7–16)
ANTIBODY SCREEN: NORMAL
APTT: 72.4 SEC (ref 24.5–35.1)
BUN BLDV-MCNC: 30 MG/DL (ref 8–23)
CALCIUM SERPL-MCNC: 8.5 MG/DL (ref 8.6–10.2)
CHLORIDE BLD-SCNC: 95 MMOL/L (ref 98–107)
CO2: 32 MMOL/L (ref 22–29)
CREAT SERPL-MCNC: 1.1 MG/DL (ref 0.5–1)
GFR AFRICAN AMERICAN: 56
GFR NON-AFRICAN AMERICAN: 47 ML/MIN/1.73
GLUCOSE BLD-MCNC: 107 MG/DL (ref 74–109)
HCT VFR BLD CALC: 30.3 % (ref 34–48)
HEMOGLOBIN: 9.4 G/DL (ref 11.5–15.5)
INR BLD: 1.2
MCH RBC QN AUTO: 30.1 PG (ref 26–35)
MCHC RBC AUTO-ENTMCNC: 31 % (ref 32–34.5)
MCV RBC AUTO: 97.1 FL (ref 80–99.9)
PDW BLD-RTO: 15.9 FL (ref 11.5–15)
PLATELET # BLD: 225 E9/L (ref 130–450)
PMV BLD AUTO: 9.5 FL (ref 7–12)
POTASSIUM SERPL-SCNC: 4 MMOL/L (ref 3.5–5)
PROTHROMBIN TIME: 13.3 SEC (ref 9.3–12.4)
RBC # BLD: 3.12 E12/L (ref 3.5–5.5)
SODIUM BLD-SCNC: 136 MMOL/L (ref 132–146)
WBC # BLD: 16.7 E9/L (ref 4.5–11.5)

## 2018-10-05 PROCEDURE — 85730 THROMBOPLASTIN TIME PARTIAL: CPT

## 2018-10-05 PROCEDURE — 2700000000 HC OXYGEN THERAPY PER DAY

## 2018-10-05 PROCEDURE — 2500000003 HC RX 250 WO HCPCS: Performed by: NURSE ANESTHETIST, CERTIFIED REGISTERED

## 2018-10-05 PROCEDURE — 99232 SBSQ HOSP IP/OBS MODERATE 35: CPT | Performed by: INTERNAL MEDICINE

## 2018-10-05 PROCEDURE — 6360000002 HC RX W HCPCS: Performed by: STUDENT IN AN ORGANIZED HEALTH CARE EDUCATION/TRAINING PROGRAM

## 2018-10-05 PROCEDURE — 6360000002 HC RX W HCPCS: Performed by: NURSE ANESTHETIST, CERTIFIED REGISTERED

## 2018-10-05 PROCEDURE — 85027 COMPLETE CBC AUTOMATED: CPT

## 2018-10-05 PROCEDURE — 6370000000 HC RX 637 (ALT 250 FOR IP): Performed by: INTERNAL MEDICINE

## 2018-10-05 PROCEDURE — 36415 COLL VENOUS BLD VENIPUNCTURE: CPT

## 2018-10-05 PROCEDURE — 6370000000 HC RX 637 (ALT 250 FOR IP): Performed by: NURSE PRACTITIONER

## 2018-10-05 PROCEDURE — 3700000001 HC ADD 15 MINUTES (ANESTHESIA): Performed by: SURGERY

## 2018-10-05 PROCEDURE — 3600000013 HC SURGERY LEVEL 3 ADDTL 15MIN: Performed by: SURGERY

## 2018-10-05 PROCEDURE — 85610 PROTHROMBIN TIME: CPT

## 2018-10-05 PROCEDURE — 88309 TISSUE EXAM BY PATHOLOGIST: CPT

## 2018-10-05 PROCEDURE — 2580000003 HC RX 258: Performed by: STUDENT IN AN ORGANIZED HEALTH CARE EDUCATION/TRAINING PROGRAM

## 2018-10-05 PROCEDURE — 7100000000 HC PACU RECOVERY - FIRST 15 MIN: Performed by: SURGERY

## 2018-10-05 PROCEDURE — 2720000010 HC SURG SUPPLY STERILE: Performed by: SURGERY

## 2018-10-05 PROCEDURE — 3700000000 HC ANESTHESIA ATTENDED CARE: Performed by: SURGERY

## 2018-10-05 PROCEDURE — 86901 BLOOD TYPING SEROLOGIC RH(D): CPT

## 2018-10-05 PROCEDURE — 80048 BASIC METABOLIC PNL TOTAL CA: CPT

## 2018-10-05 PROCEDURE — 3600000003 HC SURGERY LEVEL 3 BASE: Performed by: SURGERY

## 2018-10-05 PROCEDURE — 6360000002 HC RX W HCPCS: Performed by: ANESTHESIOLOGY

## 2018-10-05 PROCEDURE — 87081 CULTURE SCREEN ONLY: CPT

## 2018-10-05 PROCEDURE — 0DTF4ZZ RESECTION OF RIGHT LARGE INTESTINE, PERCUTANEOUS ENDOSCOPIC APPROACH: ICD-10-PCS | Performed by: SURGERY

## 2018-10-05 PROCEDURE — 86850 RBC ANTIBODY SCREEN: CPT

## 2018-10-05 PROCEDURE — 2060000000 HC ICU INTERMEDIATE R&B

## 2018-10-05 PROCEDURE — 86900 BLOOD TYPING SEROLOGIC ABO: CPT

## 2018-10-05 PROCEDURE — 2709999900 HC NON-CHARGEABLE SUPPLY: Performed by: SURGERY

## 2018-10-05 PROCEDURE — 6370000000 HC RX 637 (ALT 250 FOR IP): Performed by: HOSPITALIST

## 2018-10-05 PROCEDURE — 2500000003 HC RX 250 WO HCPCS: Performed by: STUDENT IN AN ORGANIZED HEALTH CARE EDUCATION/TRAINING PROGRAM

## 2018-10-05 PROCEDURE — 2580000003 HC RX 258: Performed by: FAMILY MEDICINE

## 2018-10-05 PROCEDURE — 2580000003 HC RX 258: Performed by: INTERNAL MEDICINE

## 2018-10-05 RX ORDER — PROPOFOL 10 MG/ML
INJECTION, EMULSION INTRAVENOUS PRN
Status: DISCONTINUED | OUTPATIENT
Start: 2018-10-05 | End: 2018-10-05 | Stop reason: SDUPTHER

## 2018-10-05 RX ORDER — ROCURONIUM BROMIDE 10 MG/ML
INJECTION, SOLUTION INTRAVENOUS PRN
Status: DISCONTINUED | OUTPATIENT
Start: 2018-10-05 | End: 2018-10-05 | Stop reason: SDUPTHER

## 2018-10-05 RX ORDER — NEOSTIGMINE METHYLSULFATE 1 MG/ML
INJECTION, SOLUTION INTRAVENOUS PRN
Status: DISCONTINUED | OUTPATIENT
Start: 2018-10-05 | End: 2018-10-05 | Stop reason: SDUPTHER

## 2018-10-05 RX ORDER — MORPHINE SULFATE 2 MG/ML
2 INJECTION, SOLUTION INTRAMUSCULAR; INTRAVENOUS
Status: CANCELLED | OUTPATIENT
Start: 2018-10-05

## 2018-10-05 RX ORDER — SODIUM CHLORIDE, SODIUM LACTATE, POTASSIUM CHLORIDE, CALCIUM CHLORIDE 600; 310; 30; 20 MG/100ML; MG/100ML; MG/100ML; MG/100ML
INJECTION, SOLUTION INTRAVENOUS CONTINUOUS
Status: DISCONTINUED | OUTPATIENT
Start: 2018-10-05 | End: 2018-10-08

## 2018-10-05 RX ORDER — ONDANSETRON 2 MG/ML
INJECTION INTRAMUSCULAR; INTRAVENOUS PRN
Status: DISCONTINUED | OUTPATIENT
Start: 2018-10-05 | End: 2018-10-05 | Stop reason: SDUPTHER

## 2018-10-05 RX ORDER — FENTANYL CITRATE 50 UG/ML
INJECTION, SOLUTION INTRAMUSCULAR; INTRAVENOUS PRN
Status: DISCONTINUED | OUTPATIENT
Start: 2018-10-05 | End: 2018-10-05 | Stop reason: SDUPTHER

## 2018-10-05 RX ORDER — LABETALOL HYDROCHLORIDE 5 MG/ML
INJECTION, SOLUTION INTRAVENOUS PRN
Status: DISCONTINUED | OUTPATIENT
Start: 2018-10-05 | End: 2018-10-05 | Stop reason: SDUPTHER

## 2018-10-05 RX ORDER — DEXAMETHASONE SODIUM PHOSPHATE 4 MG/ML
INJECTION, SOLUTION INTRA-ARTICULAR; INTRALESIONAL; INTRAMUSCULAR; INTRAVENOUS; SOFT TISSUE PRN
Status: DISCONTINUED | OUTPATIENT
Start: 2018-10-05 | End: 2018-10-05 | Stop reason: SDUPTHER

## 2018-10-05 RX ORDER — LIDOCAINE HYDROCHLORIDE 20 MG/ML
INJECTION, SOLUTION INFILTRATION; PERINEURAL PRN
Status: DISCONTINUED | OUTPATIENT
Start: 2018-10-05 | End: 2018-10-05 | Stop reason: SDUPTHER

## 2018-10-05 RX ORDER — FENTANYL CITRATE 50 UG/ML
25 INJECTION, SOLUTION INTRAMUSCULAR; INTRAVENOUS EVERY 5 MIN PRN
Status: COMPLETED | OUTPATIENT
Start: 2018-10-05 | End: 2018-10-05

## 2018-10-05 RX ORDER — MORPHINE SULFATE 4 MG/ML
4 INJECTION, SOLUTION INTRAMUSCULAR; INTRAVENOUS
Status: DISCONTINUED | OUTPATIENT
Start: 2018-10-05 | End: 2018-10-09 | Stop reason: HOSPADM

## 2018-10-05 RX ORDER — GLYCOPYRROLATE 0.2 MG/ML
INJECTION INTRAMUSCULAR; INTRAVENOUS PRN
Status: DISCONTINUED | OUTPATIENT
Start: 2018-10-05 | End: 2018-10-05 | Stop reason: SDUPTHER

## 2018-10-05 RX ORDER — MORPHINE SULFATE 2 MG/ML
2 INJECTION, SOLUTION INTRAMUSCULAR; INTRAVENOUS
Status: DISCONTINUED | OUTPATIENT
Start: 2018-10-05 | End: 2018-10-09 | Stop reason: HOSPADM

## 2018-10-05 RX ADMIN — ONDANSETRON HYDROCHLORIDE 4 MG: 2 INJECTION, SOLUTION INTRAMUSCULAR; INTRAVENOUS at 11:24

## 2018-10-05 RX ADMIN — HYDROMORPHONE HYDROCHLORIDE 0.25 MG: 1 INJECTION, SOLUTION INTRAMUSCULAR; INTRAVENOUS; SUBCUTANEOUS at 12:30

## 2018-10-05 RX ADMIN — ROCURONIUM BROMIDE 25 MG: 10 SOLUTION INTRAVENOUS at 10:21

## 2018-10-05 RX ADMIN — ROCURONIUM BROMIDE 15 MG: 10 SOLUTION INTRAVENOUS at 10:45

## 2018-10-05 RX ADMIN — LIDOCAINE HYDROCHLORIDE 40 MG: 20 INJECTION, SOLUTION INFILTRATION; PERINEURAL at 10:21

## 2018-10-05 RX ADMIN — CYCLOBENZAPRINE HYDROCHLORIDE 5 MG: 10 TABLET, FILM COATED ORAL at 01:06

## 2018-10-05 RX ADMIN — TRAZODONE HYDROCHLORIDE 100 MG: 50 TABLET ORAL at 21:53

## 2018-10-05 RX ADMIN — SODIUM CHLORIDE: 9 INJECTION, SOLUTION INTRAVENOUS at 10:08

## 2018-10-05 RX ADMIN — FENTANYL CITRATE 25 MCG: 50 INJECTION, SOLUTION INTRAMUSCULAR; INTRAVENOUS at 10:21

## 2018-10-05 RX ADMIN — LISINOPRIL 20 MG: 20 TABLET ORAL at 21:53

## 2018-10-05 RX ADMIN — FUROSEMIDE 20 MG: 20 TABLET ORAL at 21:53

## 2018-10-05 RX ADMIN — DEXAMETHASONE SODIUM PHOSPHATE 10 MG: 4 INJECTION, SOLUTION INTRAMUSCULAR; INTRAVENOUS at 10:21

## 2018-10-05 RX ADMIN — HYDROMORPHONE HYDROCHLORIDE 0.25 MG: 1 INJECTION, SOLUTION INTRAMUSCULAR; INTRAVENOUS; SUBCUTANEOUS at 12:25

## 2018-10-05 RX ADMIN — FENTANYL CITRATE 25 MCG: 50 INJECTION INTRAMUSCULAR; INTRAVENOUS at 12:03

## 2018-10-05 RX ADMIN — METRONIDAZOLE 500 MG: 500 INJECTION, SOLUTION INTRAVENOUS at 10:08

## 2018-10-05 RX ADMIN — Medication 10 ML: at 19:58

## 2018-10-05 RX ADMIN — METOPROLOL TARTRATE 50 MG: 50 TABLET ORAL at 09:02

## 2018-10-05 RX ADMIN — CEFAZOLIN SODIUM 2 G: 10 INJECTION, POWDER, FOR SOLUTION INTRAVENOUS at 10:30

## 2018-10-05 RX ADMIN — LABETALOL HYDROCHLORIDE 2.5 MG: 5 INJECTION, SOLUTION INTRAVENOUS at 10:51

## 2018-10-05 RX ADMIN — PROPOFOL 50 MG: 10 INJECTION, EMULSION INTRAVENOUS at 10:21

## 2018-10-05 RX ADMIN — POTASSIUM CHLORIDE 20 MEQ: 20 TABLET, EXTENDED RELEASE ORAL at 21:53

## 2018-10-05 RX ADMIN — MORPHINE SULFATE 15 MG: 30 TABLET ORAL at 21:53

## 2018-10-05 RX ADMIN — MORPHINE SULFATE 4 MG: 4 INJECTION, SOLUTION INTRAMUSCULAR; INTRAVENOUS at 17:02

## 2018-10-05 RX ADMIN — MORPHINE SULFATE 4 MG: 4 INJECTION, SOLUTION INTRAMUSCULAR; INTRAVENOUS at 14:29

## 2018-10-05 RX ADMIN — FENTANYL CITRATE 25 MCG: 50 INJECTION INTRAMUSCULAR; INTRAVENOUS at 12:08

## 2018-10-05 RX ADMIN — FENTANYL CITRATE 25 MCG: 50 INJECTION INTRAMUSCULAR; INTRAVENOUS at 12:13

## 2018-10-05 RX ADMIN — GLYCOPYRROLATE 0.6 MG: 0.2 INJECTION, SOLUTION INTRAMUSCULAR; INTRAVENOUS at 11:31

## 2018-10-05 RX ADMIN — METOPROLOL TARTRATE 50 MG: 50 TABLET ORAL at 21:53

## 2018-10-05 RX ADMIN — FENTANYL CITRATE 25 MCG: 50 INJECTION INTRAMUSCULAR; INTRAVENOUS at 12:18

## 2018-10-05 RX ADMIN — MORPHINE SULFATE 4 MG: 4 INJECTION, SOLUTION INTRAMUSCULAR; INTRAVENOUS at 19:58

## 2018-10-05 RX ADMIN — SODIUM CHLORIDE, POTASSIUM CHLORIDE, SODIUM LACTATE AND CALCIUM CHLORIDE: 600; 310; 30; 20 INJECTION, SOLUTION INTRAVENOUS at 14:28

## 2018-10-05 RX ADMIN — FENTANYL CITRATE 75 MCG: 50 INJECTION, SOLUTION INTRAMUSCULAR; INTRAVENOUS at 10:46

## 2018-10-05 RX ADMIN — Medication 3 MG: at 11:31

## 2018-10-05 ASSESSMENT — PULMONARY FUNCTION TESTS
PIF_VALUE: 20
PIF_VALUE: 0
PIF_VALUE: 3
PIF_VALUE: 1
PIF_VALUE: 22
PIF_VALUE: 22
PIF_VALUE: 0
PIF_VALUE: 15
PIF_VALUE: 37
PIF_VALUE: 23
PIF_VALUE: 23
PIF_VALUE: 0
PIF_VALUE: 23
PIF_VALUE: 23
PIF_VALUE: 22
PIF_VALUE: 15
PIF_VALUE: 15
PIF_VALUE: 32
PIF_VALUE: 20
PIF_VALUE: 0
PIF_VALUE: 22
PIF_VALUE: 2
PIF_VALUE: 21
PIF_VALUE: 37
PIF_VALUE: 1
PIF_VALUE: 23
PIF_VALUE: 15
PIF_VALUE: 40
PIF_VALUE: 23
PIF_VALUE: 15
PIF_VALUE: 2
PIF_VALUE: 24
PIF_VALUE: 36
PIF_VALUE: 0
PIF_VALUE: 8
PIF_VALUE: 2
PIF_VALUE: 1
PIF_VALUE: 0
PIF_VALUE: 21
PIF_VALUE: 1
PIF_VALUE: 36
PIF_VALUE: 15
PIF_VALUE: 33
PIF_VALUE: 22
PIF_VALUE: 20
PIF_VALUE: 0
PIF_VALUE: 30
PIF_VALUE: 22
PIF_VALUE: 36
PIF_VALUE: 15
PIF_VALUE: 22
PIF_VALUE: 36
PIF_VALUE: 37
PIF_VALUE: 39
PIF_VALUE: 38
PIF_VALUE: 0
PIF_VALUE: 1
PIF_VALUE: 40
PIF_VALUE: 1
PIF_VALUE: 22
PIF_VALUE: 37
PIF_VALUE: 37
PIF_VALUE: 32
PIF_VALUE: 0
PIF_VALUE: 34
PIF_VALUE: 23
PIF_VALUE: 25
PIF_VALUE: 37
PIF_VALUE: 0
PIF_VALUE: 0
PIF_VALUE: 39
PIF_VALUE: 37
PIF_VALUE: 22
PIF_VALUE: 22
PIF_VALUE: 38
PIF_VALUE: 1
PIF_VALUE: 15
PIF_VALUE: 23
PIF_VALUE: 35
PIF_VALUE: 37
PIF_VALUE: 37
PIF_VALUE: 21
PIF_VALUE: 37
PIF_VALUE: 22
PIF_VALUE: 23
PIF_VALUE: 23
PIF_VALUE: 3
PIF_VALUE: 38
PIF_VALUE: 0
PIF_VALUE: 0
PIF_VALUE: 23
PIF_VALUE: 20
PIF_VALUE: 37
PIF_VALUE: 37
PIF_VALUE: 36
PIF_VALUE: 15
PIF_VALUE: 40
PIF_VALUE: 0
PIF_VALUE: 22
PIF_VALUE: 24
PIF_VALUE: 1
PIF_VALUE: 10
PIF_VALUE: 37
PIF_VALUE: 2
PIF_VALUE: 20
PIF_VALUE: 23
PIF_VALUE: 1

## 2018-10-05 ASSESSMENT — PAIN SCALES - WONG BAKER
WONGBAKER_NUMERICALRESPONSE: 4
WONGBAKER_NUMERICALRESPONSE: 4
WONGBAKER_NUMERICALRESPONSE: 6
WONGBAKER_NUMERICALRESPONSE: 2

## 2018-10-05 ASSESSMENT — PAIN SCALES - GENERAL
PAINLEVEL_OUTOF10: 8
PAINLEVEL_OUTOF10: 10
PAINLEVEL_OUTOF10: 9

## 2018-10-05 ASSESSMENT — PAIN DESCRIPTION - LOCATION
LOCATION: ABDOMEN;BACK
LOCATION: ABDOMEN

## 2018-10-05 ASSESSMENT — PAIN DESCRIPTION - DESCRIPTORS
DESCRIPTORS: ACHING
DESCRIPTORS: SORE
DESCRIPTORS: ACHING;DISCOMFORT
DESCRIPTORS: ACHING;DISCOMFORT
DESCRIPTORS: ACHING
DESCRIPTORS: SORE

## 2018-10-05 ASSESSMENT — PAIN DESCRIPTION - PAIN TYPE
TYPE: SURGICAL PAIN
TYPE: ACUTE PAIN;SURGICAL PAIN
TYPE: ACUTE PAIN;SURGICAL PAIN
TYPE: SURGICAL PAIN
TYPE: SURGICAL PAIN

## 2018-10-05 ASSESSMENT — PAIN DESCRIPTION - FREQUENCY
FREQUENCY: CONTINUOUS
FREQUENCY: CONTINUOUS

## 2018-10-05 ASSESSMENT — PAIN - FUNCTIONAL ASSESSMENT
PAIN_FUNCTIONAL_ASSESSMENT: 0-10
PAIN_FUNCTIONAL_ASSESSMENT: 0-10

## 2018-10-05 NOTE — ANESTHESIA PRE PROCEDURE
heparin flush 100 UNIT/ML injection 100 Units  100 Units Intravenous PRN Diamond Tavera, DO        sodium chloride flush 0.9 % injection 10 mL  10 mL Intravenous 2 times per day Diamond Tavera, DO   10 mL at 10/03/18 0914    sodium chloride flush 0.9 % injection 10 mL  10 mL Intravenous PRN Adan Alma Deliabhupinder Crawford, DO   10 mL at 10/03/18 0407    mineral oil-hydrophilic petrolatum (AQUAPHOR) ointment   Topical BID PRN Gurinder Hric, DO        0.9 % sodium chloride infusion   Intravenous Continuous Gurinder Hric, DO 50 mL/hr at 10/04/18 1611      HYDROcodone-acetaminophen (NORCO) 5-325 MG per tablet 1 tablet  1 tablet Oral Q6H PRN April NeryPRISCILLA Poe - CNP   1 tablet at 10/04/18 1845    morphine (MSIR) tablet 15 mg  15 mg Oral Q12H April NeryPRISCILLA Poe - CNP   15 mg at 10/04/18 2131    ondansetron (ZOFRAN) injection 4 mg  4 mg Intravenous Q6H PRN Juan Carlos Rocha MD   4 mg at 09/27/18 1056    furosemide (LASIX) tablet 20 mg  20 mg Oral TID Juan Carlos Rocha MD   20 mg at 10/04/18 2131    isosorbide mononitrate (IMDUR) extended release tablet 60 mg  60 mg Oral Daily Juan Carlos Rocha MD   60 mg at 10/04/18 8507    levothyroxine (SYNTHROID) tablet 25 mcg  25 mcg Oral Daily Juan Carlos Rocha MD   25 mcg at 10/04/18 8146    metoprolol tartrate (LOPRESSOR) tablet 50 mg  50 mg Oral BID Juan Carlos Rocha MD   50 mg at 10/05/18 4950    therapeutic multivitamin-minerals 1 tablet  1 tablet Oral Dinner Juan Carlos Rocha MD   1 tablet at 10/04/18 1606    potassium chloride (KLOR-CON M) extended release tablet 20 mEq  20 mEq Oral BID Juan Carlos Rocha MD   20 mEq at 10/04/18 2131    pravastatin (PRAVACHOL) tablet 80 mg  80 mg Oral Daily Juan Carlos Rocha MD   80 mg at 10/04/18 0908    traZODone (DESYREL) tablet 100 mg  100 mg Oral Nightly Juan Carlos Rocha MD   100 mg at 10/04/18 2131    magnesium hydroxide (MILK OF MAGNESIA) 400 MG/5ML suspension 30 mL  30 mL Oral Daily

## 2018-10-05 NOTE — PROGRESS NOTES
Nursing Transfer Note    Data:  Summary of patients progress: Dr Magdalena Gramajo bowel resection  Reason for transfer: next level of care    Action:  Explained reason for transfer to Patient and Family. Report given to: Ni Marks RN, using RN Handoff Navigator.   Mode of transportation: bed    Response:  RN Recommendations: pain mgt

## 2018-10-05 NOTE — PROGRESS NOTES
atraumatic  Eyes: pupils equal, round, and reactive to light, extraocular eye movements intact, conjunctivae normal  ENT: tympanic membrane, external ear and ear canal normal bilaterally, oropharynx clear and moist with normal mucous membranes  Neck: neck supple and non tender without mass, no thyromegaly or thyroid nodules, no cervical lymphadenopathy   Pulmonary/Chest: clear to auscultation bilaterally- no wheezes, rales or rhonchi, normal air movement, no respiratory distress  Cardiovascular: normal rate, normal S1 and S2, no gallops, intact distal pulses and no carotid bruits      Recent Labs      10/03/18   0410  10/04/18   0436  10/05/18   0100   NA  134  132  136   K  4.1  3.7  4.0   CL  92*  92*  95*   CO2  34*  31*  32*   BUN  22  34*  30*   CREATININE  0.8  1.1*  1.1*   GLUCOSE  123*  119*  107   CALCIUM  8.9  8.2*  8.5*       Recent Labs      10/05/18   0100   WBC  16.7*   RBC  3.12*   HGB  9.4*   HCT  30.3*   MCV  97.1   MCH  30.1   MCHC  31.0*   RDW  15.9*   PLT  225   MPV  9.5       PT/INR:    Lab Results   Component Value Date    PROTIME 13.3 10/05/2018    PROTIME 27.4 05/25/2012    INR 1.2 10/05/2018        Radiology:   XR CHEST PORTABLE   Final Result   1. Indeterminate airspace disease in left hilar and bilateral lung   bases. Clinical correlation recommended. Findings were present on the   ninth 2018 examination. 2. Stable, enlarged cardiomediastinal silhouette. .   3. Suspected underlying mild central pulmonary vascular congestion. 4. Possible venous catheter overlying the right axillary region,   clinical correlation recommended for desired location.       CT ABDOMEN PELVIS WO CONTRAST Additional Contrast? Oral   Final Result   There are multiple diverticula seen    Thickening of the terminal ileum and the cecum, combination raises the   possibility of Crohn's disease   Mildly dilated loops of small bowel suspicious for partial small bowel   obstruction, improved from the previous study XR Acute Abd Series Chest 1 VW   Final Result      Cannot excluded mild degree of ileus. There is however no pattern for   obstruction or constipation. Please correlate clinically. XR CHEST STANDARD (2 VW)   Final Result   Left-sided pleural effusion with associated left lower   lobe airspace opacity. Recommend clinical correlation. CT ABDOMEN PELVIS WO CONTRAST   Final Result      1. The terminal ileum appears thickened for a short segment at the   ileocecal junction, and proximal to this, there is prominent fluid   distention of ileum throughout, possibly indicating a functional   outflow obstruction versus ileus related to terminal ileal   inflammatory change. Small bowel measures up to 3.9 cm in the abdomen   elsewhere. A small amount of extraluminal fluid is noted in the right   lower abdomen, likely extending from the distal ileum. 2. The colon shows diffuse diverticulosis coli of the descending and   pelvic colonic segments without acute complications. 3. At present, there is no evidence of a perforated viscus. A small   amount of perihepatic fluid is identified. 4. There is very prominent cardiomegaly without evidence of acute   decompensation. 5. There is a trace right-sided pleural effusion, and density in the   retrocardiac left lower lobe is suspicious for early interstitial   pneumonia.                             Assessment:    Principal Problem:    Terminal ileitis with rectal bleeding (HCC)  Active Problems:    Generalized abdominal pain    Atrial fibrillation (HCC)    CAD (coronary artery disease)    Pacemaker    Chronic anticoagulation    Valvular heart disease    BRBPR (bright red blood per rectum)    Diarrhea    Nausea and vomiting    Supratherapeutic PT INR    Essential hypertension    Chronic diastolic CHF (congestive heart failure) (HCC)    CKD (chronic kidney disease)    Hyperlipidemia    Hypothyroidism    Abnormal chest sounds - suspect LLL

## 2018-10-06 LAB
ANION GAP SERPL CALCULATED.3IONS-SCNC: 9 MMOL/L (ref 7–16)
BASOPHILS ABSOLUTE: 0.01 E9/L (ref 0–0.2)
BASOPHILS RELATIVE PERCENT: 0.1 % (ref 0–2)
BUN BLDV-MCNC: 16 MG/DL (ref 8–23)
CALCIUM SERPL-MCNC: 8.8 MG/DL (ref 8.6–10.2)
CHLORIDE BLD-SCNC: 97 MMOL/L (ref 98–107)
CO2: 30 MMOL/L (ref 22–29)
CREAT SERPL-MCNC: 0.7 MG/DL (ref 0.5–1)
EOSINOPHILS ABSOLUTE: 0 E9/L (ref 0.05–0.5)
EOSINOPHILS RELATIVE PERCENT: 0 % (ref 0–6)
GFR AFRICAN AMERICAN: >60
GFR NON-AFRICAN AMERICAN: >60 ML/MIN/1.73
GLUCOSE BLD-MCNC: 147 MG/DL (ref 74–109)
HCT VFR BLD CALC: 27.7 % (ref 34–48)
HEMOGLOBIN: 8.5 G/DL (ref 11.5–15.5)
IMMATURE GRANULOCYTES #: 0.23 E9/L
IMMATURE GRANULOCYTES %: 1.5 % (ref 0–5)
INR BLD: 1
LYMPHOCYTES ABSOLUTE: 0.86 E9/L (ref 1.5–4)
LYMPHOCYTES RELATIVE PERCENT: 5.5 % (ref 20–42)
MCH RBC QN AUTO: 29.7 PG (ref 26–35)
MCHC RBC AUTO-ENTMCNC: 30.7 % (ref 32–34.5)
MCV RBC AUTO: 96.9 FL (ref 80–99.9)
MONOCYTES ABSOLUTE: 0.72 E9/L (ref 0.1–0.95)
MONOCYTES RELATIVE PERCENT: 4.6 % (ref 2–12)
MRSA CULTURE ONLY: NORMAL
NEUTROPHILS ABSOLUTE: 13.9 E9/L (ref 1.8–7.3)
NEUTROPHILS RELATIVE PERCENT: 88.3 % (ref 43–80)
PDW BLD-RTO: 15.9 FL (ref 11.5–15)
PLATELET # BLD: 199 E9/L (ref 130–450)
PMV BLD AUTO: 9.5 FL (ref 7–12)
POTASSIUM SERPL-SCNC: 4.4 MMOL/L (ref 3.5–5)
PROTHROMBIN TIME: 12 SEC (ref 9.3–12.4)
RBC # BLD: 2.86 E12/L (ref 3.5–5.5)
SODIUM BLD-SCNC: 136 MMOL/L (ref 132–146)
WBC # BLD: 15.7 E9/L (ref 4.5–11.5)

## 2018-10-06 PROCEDURE — 36415 COLL VENOUS BLD VENIPUNCTURE: CPT

## 2018-10-06 PROCEDURE — 2580000003 HC RX 258: Performed by: FAMILY MEDICINE

## 2018-10-06 PROCEDURE — 6370000000 HC RX 637 (ALT 250 FOR IP): Performed by: SURGERY

## 2018-10-06 PROCEDURE — 2700000000 HC OXYGEN THERAPY PER DAY

## 2018-10-06 PROCEDURE — 85610 PROTHROMBIN TIME: CPT

## 2018-10-06 PROCEDURE — 99232 SBSQ HOSP IP/OBS MODERATE 35: CPT | Performed by: INTERNAL MEDICINE

## 2018-10-06 PROCEDURE — 6360000002 HC RX W HCPCS: Performed by: STUDENT IN AN ORGANIZED HEALTH CARE EDUCATION/TRAINING PROGRAM

## 2018-10-06 PROCEDURE — 85025 COMPLETE CBC W/AUTO DIFF WBC: CPT

## 2018-10-06 PROCEDURE — 80048 BASIC METABOLIC PNL TOTAL CA: CPT

## 2018-10-06 PROCEDURE — 2060000000 HC ICU INTERMEDIATE R&B

## 2018-10-06 PROCEDURE — 6370000000 HC RX 637 (ALT 250 FOR IP): Performed by: HOSPITALIST

## 2018-10-06 PROCEDURE — 94640 AIRWAY INHALATION TREATMENT: CPT

## 2018-10-06 PROCEDURE — 6360000002 HC RX W HCPCS: Performed by: SURGERY

## 2018-10-06 PROCEDURE — 36592 COLLECT BLOOD FROM PICC: CPT

## 2018-10-06 PROCEDURE — 6370000000 HC RX 637 (ALT 250 FOR IP): Performed by: NURSE PRACTITIONER

## 2018-10-06 PROCEDURE — 2580000003 HC RX 258: Performed by: STUDENT IN AN ORGANIZED HEALTH CARE EDUCATION/TRAINING PROGRAM

## 2018-10-06 PROCEDURE — 6370000000 HC RX 637 (ALT 250 FOR IP): Performed by: INTERNAL MEDICINE

## 2018-10-06 RX ORDER — DOCUSATE SODIUM 100 MG/1
100 CAPSULE, LIQUID FILLED ORAL 2 TIMES DAILY
Status: DISCONTINUED | OUTPATIENT
Start: 2018-10-06 | End: 2018-10-09 | Stop reason: HOSPADM

## 2018-10-06 RX ADMIN — MORPHINE SULFATE 4 MG: 4 INJECTION, SOLUTION INTRAMUSCULAR; INTRAVENOUS at 16:39

## 2018-10-06 RX ADMIN — MORPHINE SULFATE 4 MG: 4 INJECTION, SOLUTION INTRAMUSCULAR; INTRAVENOUS at 06:40

## 2018-10-06 RX ADMIN — FUROSEMIDE 20 MG: 20 TABLET ORAL at 20:27

## 2018-10-06 RX ADMIN — TRAZODONE HYDROCHLORIDE 100 MG: 50 TABLET ORAL at 20:28

## 2018-10-06 RX ADMIN — MORPHINE SULFATE 4 MG: 4 INJECTION, SOLUTION INTRAMUSCULAR; INTRAVENOUS at 04:16

## 2018-10-06 RX ADMIN — MORPHINE SULFATE 15 MG: 30 TABLET ORAL at 09:24

## 2018-10-06 RX ADMIN — Medication 10 ML: at 00:43

## 2018-10-06 RX ADMIN — ENOXAPARIN SODIUM 40 MG: 40 INJECTION, SOLUTION INTRAVENOUS; SUBCUTANEOUS at 16:39

## 2018-10-06 RX ADMIN — Medication 10 ML: at 06:40

## 2018-10-06 RX ADMIN — LEVOTHYROXINE SODIUM 25 MCG: 25 TABLET ORAL at 06:40

## 2018-10-06 RX ADMIN — Medication 10 ML: at 04:17

## 2018-10-06 RX ADMIN — CYCLOBENZAPRINE HYDROCHLORIDE 5 MG: 10 TABLET, FILM COATED ORAL at 16:39

## 2018-10-06 RX ADMIN — DOCUSATE SODIUM 100 MG: 100 CAPSULE, LIQUID FILLED ORAL at 20:27

## 2018-10-06 RX ADMIN — FUROSEMIDE 20 MG: 20 TABLET ORAL at 14:29

## 2018-10-06 RX ADMIN — FAMOTIDINE 20 MG: 20 TABLET ORAL at 09:24

## 2018-10-06 RX ADMIN — METOPROLOL TARTRATE 50 MG: 50 TABLET ORAL at 09:23

## 2018-10-06 RX ADMIN — SODIUM CHLORIDE, POTASSIUM CHLORIDE, SODIUM LACTATE AND CALCIUM CHLORIDE: 600; 310; 30; 20 INJECTION, SOLUTION INTRAVENOUS at 00:39

## 2018-10-06 RX ADMIN — HYDROCODONE BITARTRATE AND ACETAMINOPHEN 1 TABLET: 5; 325 TABLET ORAL at 12:08

## 2018-10-06 RX ADMIN — HYDROCHLOROTHIAZIDE 25 MG: 25 TABLET ORAL at 09:24

## 2018-10-06 RX ADMIN — PRAVASTATIN SODIUM 80 MG: 20 TABLET ORAL at 09:23

## 2018-10-06 RX ADMIN — SODIUM CHLORIDE, POTASSIUM CHLORIDE, SODIUM LACTATE AND CALCIUM CHLORIDE: 600; 310; 30; 20 INJECTION, SOLUTION INTRAVENOUS at 22:14

## 2018-10-06 RX ADMIN — MORPHINE SULFATE 15 MG: 30 TABLET ORAL at 20:27

## 2018-10-06 RX ADMIN — MORPHINE SULFATE 4 MG: 4 INJECTION, SOLUTION INTRAMUSCULAR; INTRAVENOUS at 00:42

## 2018-10-06 RX ADMIN — POTASSIUM CHLORIDE 20 MEQ: 20 TABLET, EXTENDED RELEASE ORAL at 20:27

## 2018-10-06 RX ADMIN — CYCLOBENZAPRINE HYDROCHLORIDE 5 MG: 10 TABLET, FILM COATED ORAL at 09:24

## 2018-10-06 RX ADMIN — LISINOPRIL 20 MG: 20 TABLET ORAL at 09:23

## 2018-10-06 RX ADMIN — SODIUM CHLORIDE, POTASSIUM CHLORIDE, SODIUM LACTATE AND CALCIUM CHLORIDE: 600; 310; 30; 20 INJECTION, SOLUTION INTRAVENOUS at 09:37

## 2018-10-06 RX ADMIN — POTASSIUM CHLORIDE 20 MEQ: 20 TABLET, EXTENDED RELEASE ORAL at 09:23

## 2018-10-06 RX ADMIN — ISOSORBIDE MONONITRATE 60 MG: 60 TABLET, EXTENDED RELEASE ORAL at 09:24

## 2018-10-06 RX ADMIN — MULTIPLE VITAMINS W/ MINERALS TAB 1 TABLET: TAB at 16:39

## 2018-10-06 RX ADMIN — CYCLOBENZAPRINE HYDROCHLORIDE 5 MG: 10 TABLET, FILM COATED ORAL at 00:31

## 2018-10-06 RX ADMIN — BECLOMETHASONE DIPROPIONATE HFA 1 PUFF: 40 AEROSOL, METERED RESPIRATORY (INHALATION) at 20:04

## 2018-10-06 RX ADMIN — FUROSEMIDE 20 MG: 20 TABLET ORAL at 09:24

## 2018-10-06 RX ADMIN — DOCUSATE SODIUM 100 MG: 100 CAPSULE, LIQUID FILLED ORAL at 09:23

## 2018-10-06 RX ADMIN — METOPROLOL TARTRATE 50 MG: 50 TABLET ORAL at 20:26

## 2018-10-06 ASSESSMENT — PAIN DESCRIPTION - LOCATION
LOCATION: BACK
LOCATION: ABDOMEN
LOCATION: NECK
LOCATION: ABDOMEN
LOCATION: ABDOMEN
LOCATION: BACK
LOCATION: ABDOMEN

## 2018-10-06 ASSESSMENT — PAIN DESCRIPTION - PAIN TYPE
TYPE: ACUTE PAIN;SURGICAL PAIN
TYPE: ACUTE PAIN;SURGICAL PAIN
TYPE: CHRONIC PAIN
TYPE: ACUTE PAIN;SURGICAL PAIN
TYPE: CHRONIC PAIN
TYPE: ACUTE PAIN;SURGICAL PAIN
TYPE: ACUTE PAIN;SURGICAL PAIN
TYPE: CHRONIC PAIN
TYPE: ACUTE PAIN;SURGICAL PAIN

## 2018-10-06 ASSESSMENT — PAIN SCALES - GENERAL
PAINLEVEL_OUTOF10: 9
PAINLEVEL_OUTOF10: 0
PAINLEVEL_OUTOF10: 9
PAINLEVEL_OUTOF10: 7
PAINLEVEL_OUTOF10: 9
PAINLEVEL_OUTOF10: 10
PAINLEVEL_OUTOF10: 8
PAINLEVEL_OUTOF10: 9
PAINLEVEL_OUTOF10: 8
PAINLEVEL_OUTOF10: 5
PAINLEVEL_OUTOF10: 7

## 2018-10-06 ASSESSMENT — PAIN DESCRIPTION - FREQUENCY
FREQUENCY: CONTINUOUS
FREQUENCY: INTERMITTENT
FREQUENCY: CONTINUOUS
FREQUENCY: CONTINUOUS
FREQUENCY: INTERMITTENT

## 2018-10-06 ASSESSMENT — PAIN DESCRIPTION - DESCRIPTORS
DESCRIPTORS: ACHING;DISCOMFORT
DESCRIPTORS: ACHING;DISCOMFORT
DESCRIPTORS: ACHING;DISCOMFORT;SHARP
DESCRIPTORS: ACHING;DISCOMFORT
DESCRIPTORS: ACHING;JABBING;SHARP
DESCRIPTORS: ACHING;DISCOMFORT
DESCRIPTORS: ACHING;DISCOMFORT

## 2018-10-06 NOTE — PROGRESS NOTES
terminal ileum and the cecum, combination raises the   possibility of Crohn's disease   Mildly dilated loops of small bowel suspicious for partial small bowel   obstruction, improved from the previous study                                 XR Acute Abd Series Chest 1 VW   Final Result      Cannot excluded mild degree of ileus. There is however no pattern for   obstruction or constipation. Please correlate clinically. XR CHEST STANDARD (2 VW)   Final Result   Left-sided pleural effusion with associated left lower   lobe airspace opacity. Recommend clinical correlation. CT ABDOMEN PELVIS WO CONTRAST   Final Result      1. The terminal ileum appears thickened for a short segment at the   ileocecal junction, and proximal to this, there is prominent fluid   distention of ileum throughout, possibly indicating a functional   outflow obstruction versus ileus related to terminal ileal   inflammatory change. Small bowel measures up to 3.9 cm in the abdomen   elsewhere. A small amount of extraluminal fluid is noted in the right   lower abdomen, likely extending from the distal ileum. 2. The colon shows diffuse diverticulosis coli of the descending and   pelvic colonic segments without acute complications. 3. At present, there is no evidence of a perforated viscus. A small   amount of perihepatic fluid is identified. 4. There is very prominent cardiomegaly without evidence of acute   decompensation. 5. There is a trace right-sided pleural effusion, and density in the   retrocardiac left lower lobe is suspicious for early interstitial   pneumonia.                             Assessment:    Principal Problem:    Terminal ileitis with rectal bleeding (HCC)  Active Problems:    Generalized abdominal pain    Atrial fibrillation (HCC)    CAD (coronary artery disease)    Pacemaker    Chronic anticoagulation    Valvular heart disease    BRBPR (bright red blood per rectum)    Diarrhea    Nausea and vomiting

## 2018-10-07 LAB
ANION GAP SERPL CALCULATED.3IONS-SCNC: 5 MMOL/L (ref 7–16)
BASOPHILS ABSOLUTE: 0.01 E9/L (ref 0–0.2)
BASOPHILS RELATIVE PERCENT: 0.1 % (ref 0–2)
BLOOD BANK DISPENSE STATUS: NORMAL
BLOOD BANK PRODUCT CODE: NORMAL
BPU ID: NORMAL
BUN BLDV-MCNC: 14 MG/DL (ref 8–23)
CALCIUM SERPL-MCNC: 8.2 MG/DL (ref 8.6–10.2)
CHLORIDE BLD-SCNC: 97 MMOL/L (ref 98–107)
CO2: 32 MMOL/L (ref 22–29)
CREAT SERPL-MCNC: 0.8 MG/DL (ref 0.5–1)
DESCRIPTION BLOOD BANK: NORMAL
EOSINOPHILS ABSOLUTE: 0.11 E9/L (ref 0.05–0.5)
EOSINOPHILS RELATIVE PERCENT: 0.8 % (ref 0–6)
GFR AFRICAN AMERICAN: >60
GFR NON-AFRICAN AMERICAN: >60 ML/MIN/1.73
GLUCOSE BLD-MCNC: 119 MG/DL (ref 74–109)
HCT VFR BLD CALC: 21 % (ref 34–48)
HCT VFR BLD CALC: 21.1 % (ref 34–48)
HEMOGLOBIN: 6.6 G/DL (ref 11.5–15.5)
HEMOGLOBIN: 6.6 G/DL (ref 11.5–15.5)
IMMATURE GRANULOCYTES #: 0.12 E9/L
IMMATURE GRANULOCYTES %: 0.9 % (ref 0–5)
INR BLD: 1.1
LYMPHOCYTES ABSOLUTE: 1.04 E9/L (ref 1.5–4)
LYMPHOCYTES RELATIVE PERCENT: 7.4 % (ref 20–42)
MCH RBC QN AUTO: 30.8 PG (ref 26–35)
MCHC RBC AUTO-ENTMCNC: 31.4 % (ref 32–34.5)
MCV RBC AUTO: 98.1 FL (ref 80–99.9)
MONOCYTES ABSOLUTE: 1.1 E9/L (ref 0.1–0.95)
MONOCYTES RELATIVE PERCENT: 7.8 % (ref 2–12)
NEUTROPHILS ABSOLUTE: 11.72 E9/L (ref 1.8–7.3)
NEUTROPHILS RELATIVE PERCENT: 83 % (ref 43–80)
PDW BLD-RTO: 16 FL (ref 11.5–15)
PLATELET # BLD: 151 E9/L (ref 130–450)
PMV BLD AUTO: 9.8 FL (ref 7–12)
POTASSIUM SERPL-SCNC: 3.8 MMOL/L (ref 3.5–5)
PROTHROMBIN TIME: 12.1 SEC (ref 9.3–12.4)
RBC # BLD: 2.14 E12/L (ref 3.5–5.5)
SODIUM BLD-SCNC: 134 MMOL/L (ref 132–146)
WBC # BLD: 14.1 E9/L (ref 4.5–11.5)

## 2018-10-07 PROCEDURE — 85025 COMPLETE CBC W/AUTO DIFF WBC: CPT

## 2018-10-07 PROCEDURE — 85018 HEMOGLOBIN: CPT

## 2018-10-07 PROCEDURE — 99233 SBSQ HOSP IP/OBS HIGH 50: CPT | Performed by: INTERNAL MEDICINE

## 2018-10-07 PROCEDURE — 80048 BASIC METABOLIC PNL TOTAL CA: CPT

## 2018-10-07 PROCEDURE — 36415 COLL VENOUS BLD VENIPUNCTURE: CPT

## 2018-10-07 PROCEDURE — 2060000000 HC ICU INTERMEDIATE R&B

## 2018-10-07 PROCEDURE — 36592 COLLECT BLOOD FROM PICC: CPT

## 2018-10-07 PROCEDURE — 2580000003 HC RX 258: Performed by: STUDENT IN AN ORGANIZED HEALTH CARE EDUCATION/TRAINING PROGRAM

## 2018-10-07 PROCEDURE — 36430 TRANSFUSION BLD/BLD COMPNT: CPT

## 2018-10-07 PROCEDURE — 6370000000 HC RX 637 (ALT 250 FOR IP): Performed by: INTERNAL MEDICINE

## 2018-10-07 PROCEDURE — 2700000000 HC OXYGEN THERAPY PER DAY

## 2018-10-07 PROCEDURE — 6370000000 HC RX 637 (ALT 250 FOR IP): Performed by: SURGERY

## 2018-10-07 PROCEDURE — 85610 PROTHROMBIN TIME: CPT

## 2018-10-07 PROCEDURE — 86923 COMPATIBILITY TEST ELECTRIC: CPT

## 2018-10-07 PROCEDURE — 2580000003 HC RX 258: Performed by: SURGERY

## 2018-10-07 PROCEDURE — P9016 RBC LEUKOCYTES REDUCED: HCPCS

## 2018-10-07 PROCEDURE — 85014 HEMATOCRIT: CPT

## 2018-10-07 PROCEDURE — 6370000000 HC RX 637 (ALT 250 FOR IP): Performed by: NURSE PRACTITIONER

## 2018-10-07 PROCEDURE — 6370000000 HC RX 637 (ALT 250 FOR IP): Performed by: HOSPITALIST

## 2018-10-07 PROCEDURE — 94640 AIRWAY INHALATION TREATMENT: CPT

## 2018-10-07 RX ORDER — 0.9 % SODIUM CHLORIDE 0.9 %
250 INTRAVENOUS SOLUTION INTRAVENOUS ONCE
Status: COMPLETED | OUTPATIENT
Start: 2018-10-07 | End: 2018-10-07

## 2018-10-07 RX ADMIN — FUROSEMIDE 20 MG: 20 TABLET ORAL at 09:34

## 2018-10-07 RX ADMIN — LEVOTHYROXINE SODIUM 25 MCG: 25 TABLET ORAL at 06:30

## 2018-10-07 RX ADMIN — TRAZODONE HYDROCHLORIDE 100 MG: 50 TABLET ORAL at 22:10

## 2018-10-07 RX ADMIN — POTASSIUM CHLORIDE 20 MEQ: 20 TABLET, EXTENDED RELEASE ORAL at 09:34

## 2018-10-07 RX ADMIN — HYDROCODONE BITARTRATE AND ACETAMINOPHEN 1 TABLET: 5; 325 TABLET ORAL at 15:09

## 2018-10-07 RX ADMIN — BECLOMETHASONE DIPROPIONATE HFA 1 PUFF: 40 AEROSOL, METERED RESPIRATORY (INHALATION) at 07:32

## 2018-10-07 RX ADMIN — FAMOTIDINE 20 MG: 20 TABLET ORAL at 09:34

## 2018-10-07 RX ADMIN — POTASSIUM CHLORIDE 20 MEQ: 20 TABLET, EXTENDED RELEASE ORAL at 22:11

## 2018-10-07 RX ADMIN — HYDROCHLOROTHIAZIDE 25 MG: 25 TABLET ORAL at 09:34

## 2018-10-07 RX ADMIN — DOCUSATE SODIUM 100 MG: 100 CAPSULE, LIQUID FILLED ORAL at 22:10

## 2018-10-07 RX ADMIN — FUROSEMIDE 20 MG: 20 TABLET ORAL at 15:04

## 2018-10-07 RX ADMIN — MORPHINE SULFATE 15 MG: 30 TABLET ORAL at 09:34

## 2018-10-07 RX ADMIN — SODIUM CHLORIDE, POTASSIUM CHLORIDE, SODIUM LACTATE AND CALCIUM CHLORIDE: 600; 310; 30; 20 INJECTION, SOLUTION INTRAVENOUS at 09:46

## 2018-10-07 RX ADMIN — DOCUSATE SODIUM 100 MG: 100 CAPSULE, LIQUID FILLED ORAL at 09:34

## 2018-10-07 RX ADMIN — METOPROLOL TARTRATE 50 MG: 50 TABLET ORAL at 09:34

## 2018-10-07 RX ADMIN — SODIUM CHLORIDE 250 ML: 9 INJECTION, SOLUTION INTRAVENOUS at 11:49

## 2018-10-07 RX ADMIN — METOPROLOL TARTRATE 50 MG: 50 TABLET ORAL at 22:11

## 2018-10-07 RX ADMIN — SODIUM CHLORIDE, POTASSIUM CHLORIDE, SODIUM LACTATE AND CALCIUM CHLORIDE: 600; 310; 30; 20 INJECTION, SOLUTION INTRAVENOUS at 23:46

## 2018-10-07 RX ADMIN — PRAVASTATIN SODIUM 80 MG: 20 TABLET ORAL at 09:34

## 2018-10-07 RX ADMIN — CYCLOBENZAPRINE HYDROCHLORIDE 5 MG: 10 TABLET, FILM COATED ORAL at 09:35

## 2018-10-07 RX ADMIN — FUROSEMIDE 20 MG: 20 TABLET ORAL at 22:11

## 2018-10-07 RX ADMIN — CYCLOBENZAPRINE HYDROCHLORIDE 5 MG: 10 TABLET, FILM COATED ORAL at 00:33

## 2018-10-07 RX ADMIN — CYCLOBENZAPRINE HYDROCHLORIDE 5 MG: 10 TABLET, FILM COATED ORAL at 16:34

## 2018-10-07 RX ADMIN — BECLOMETHASONE DIPROPIONATE HFA 1 PUFF: 40 AEROSOL, METERED RESPIRATORY (INHALATION) at 20:26

## 2018-10-07 RX ADMIN — MULTIPLE VITAMINS W/ MINERALS TAB 1 TABLET: TAB at 16:34

## 2018-10-07 RX ADMIN — HYDROCODONE BITARTRATE AND ACETAMINOPHEN 1 TABLET: 5; 325 TABLET ORAL at 00:33

## 2018-10-07 RX ADMIN — ISOSORBIDE MONONITRATE 60 MG: 60 TABLET, EXTENDED RELEASE ORAL at 09:46

## 2018-10-07 ASSESSMENT — PAIN DESCRIPTION - PAIN TYPE
TYPE: CHRONIC PAIN
TYPE: CHRONIC PAIN
TYPE: ACUTE PAIN;SURGICAL PAIN

## 2018-10-07 ASSESSMENT — PAIN SCALES - GENERAL
PAINLEVEL_OUTOF10: 0
PAINLEVEL_OUTOF10: 7
PAINLEVEL_OUTOF10: 10
PAINLEVEL_OUTOF10: 0
PAINLEVEL_OUTOF10: 5
PAINLEVEL_OUTOF10: 9

## 2018-10-07 ASSESSMENT — PAIN DESCRIPTION - DESCRIPTORS
DESCRIPTORS: ACHING;DISCOMFORT

## 2018-10-07 ASSESSMENT — PAIN DESCRIPTION - LOCATION
LOCATION: ABDOMEN
LOCATION: BACK
LOCATION: BACK

## 2018-10-07 ASSESSMENT — PAIN DESCRIPTION - FREQUENCY
FREQUENCY: INTERMITTENT

## 2018-10-07 NOTE — PROGRESS NOTES
S: pt reports no flatus or BM, but pain controlled    O: avss    Hgb 6.6    Abd: softly distended, wounds intact    Impression: s/p right hemicolectomy, acute blood loss anemia    Plan: transfuse 1 unit, soft diet, ambulate, hold lovenox, remove rivas

## 2018-10-07 NOTE — PROGRESS NOTES
Thickening of the terminal ileum and the cecum, combination raises the   possibility of Crohn's disease   Mildly dilated loops of small bowel suspicious for partial small bowel   obstruction, improved from the previous study                                 XR Acute Abd Series Chest 1 VW   Final Result      Cannot excluded mild degree of ileus. There is however no pattern for   obstruction or constipation. Please correlate clinically. XR CHEST STANDARD (2 VW)   Final Result   Left-sided pleural effusion with associated left lower   lobe airspace opacity. Recommend clinical correlation. CT ABDOMEN PELVIS WO CONTRAST   Final Result      1. The terminal ileum appears thickened for a short segment at the   ileocecal junction, and proximal to this, there is prominent fluid   distention of ileum throughout, possibly indicating a functional   outflow obstruction versus ileus related to terminal ileal   inflammatory change. Small bowel measures up to 3.9 cm in the abdomen   elsewhere. A small amount of extraluminal fluid is noted in the right   lower abdomen, likely extending from the distal ileum. 2. The colon shows diffuse diverticulosis coli of the descending and   pelvic colonic segments without acute complications. 3. At present, there is no evidence of a perforated viscus. A small   amount of perihepatic fluid is identified. 4. There is very prominent cardiomegaly without evidence of acute   decompensation. 5. There is a trace right-sided pleural effusion, and density in the   retrocardiac left lower lobe is suspicious for early interstitial   pneumonia.                             Assessment:    Principal Problem:    Terminal ileitis with rectal bleeding (HCC)  Active Problems:    Generalized abdominal pain    Atrial fibrillation (HCC)    CAD (coronary artery disease)    Pacemaker    Chronic anticoagulation    Valvular heart disease    BRBPR (bright red blood per rectum)    Diarrhea    Nausea

## 2018-10-08 LAB
ANION GAP SERPL CALCULATED.3IONS-SCNC: 8 MMOL/L (ref 7–16)
BASOPHILS ABSOLUTE: 0.01 E9/L (ref 0–0.2)
BASOPHILS RELATIVE PERCENT: 0.1 % (ref 0–2)
BUN BLDV-MCNC: 15 MG/DL (ref 8–23)
CALCIUM SERPL-MCNC: 8.5 MG/DL (ref 8.6–10.2)
CHLORIDE BLD-SCNC: 96 MMOL/L (ref 98–107)
CO2: 32 MMOL/L (ref 22–29)
CREAT SERPL-MCNC: 0.7 MG/DL (ref 0.5–1)
EOSINOPHILS ABSOLUTE: 0.19 E9/L (ref 0.05–0.5)
EOSINOPHILS RELATIVE PERCENT: 1.9 % (ref 0–6)
GFR AFRICAN AMERICAN: >60
GFR NON-AFRICAN AMERICAN: >60 ML/MIN/1.73
GLUCOSE BLD-MCNC: 108 MG/DL (ref 74–109)
HCT VFR BLD CALC: 26.3 % (ref 34–48)
HEMOGLOBIN: 8.2 G/DL (ref 11.5–15.5)
IMMATURE GRANULOCYTES #: 0.07 E9/L
IMMATURE GRANULOCYTES %: 0.7 % (ref 0–5)
INR BLD: 1.1
LYMPHOCYTES ABSOLUTE: 0.82 E9/L (ref 1.5–4)
LYMPHOCYTES RELATIVE PERCENT: 8.1 % (ref 20–42)
MCH RBC QN AUTO: 30.4 PG (ref 26–35)
MCHC RBC AUTO-ENTMCNC: 31.2 % (ref 32–34.5)
MCV RBC AUTO: 97.4 FL (ref 80–99.9)
MONOCYTES ABSOLUTE: 1.12 E9/L (ref 0.1–0.95)
MONOCYTES RELATIVE PERCENT: 11 % (ref 2–12)
NEUTROPHILS ABSOLUTE: 7.95 E9/L (ref 1.8–7.3)
NEUTROPHILS RELATIVE PERCENT: 78.2 % (ref 43–80)
PDW BLD-RTO: 15.9 FL (ref 11.5–15)
PLATELET # BLD: 137 E9/L (ref 130–450)
PMV BLD AUTO: 10.8 FL (ref 7–12)
POTASSIUM SERPL-SCNC: 4.5 MMOL/L (ref 3.5–5)
PROTHROMBIN TIME: 12.3 SEC (ref 9.3–12.4)
RBC # BLD: 2.7 E12/L (ref 3.5–5.5)
SODIUM BLD-SCNC: 136 MMOL/L (ref 132–146)
WBC # BLD: 10.2 E9/L (ref 4.5–11.5)

## 2018-10-08 PROCEDURE — 99232 SBSQ HOSP IP/OBS MODERATE 35: CPT | Performed by: INTERNAL MEDICINE

## 2018-10-08 PROCEDURE — 36592 COLLECT BLOOD FROM PICC: CPT

## 2018-10-08 PROCEDURE — 6370000000 HC RX 637 (ALT 250 FOR IP): Performed by: INTERNAL MEDICINE

## 2018-10-08 PROCEDURE — 85025 COMPLETE CBC W/AUTO DIFF WBC: CPT

## 2018-10-08 PROCEDURE — 6370000000 HC RX 637 (ALT 250 FOR IP): Performed by: SURGERY

## 2018-10-08 PROCEDURE — 94640 AIRWAY INHALATION TREATMENT: CPT

## 2018-10-08 PROCEDURE — 2580000003 HC RX 258: Performed by: FAMILY MEDICINE

## 2018-10-08 PROCEDURE — 6370000000 HC RX 637 (ALT 250 FOR IP): Performed by: NURSE PRACTITIONER

## 2018-10-08 PROCEDURE — 85610 PROTHROMBIN TIME: CPT

## 2018-10-08 PROCEDURE — 36415 COLL VENOUS BLD VENIPUNCTURE: CPT

## 2018-10-08 PROCEDURE — 6360000002 HC RX W HCPCS: Performed by: INTERNAL MEDICINE

## 2018-10-08 PROCEDURE — 2700000000 HC OXYGEN THERAPY PER DAY

## 2018-10-08 PROCEDURE — 1200000000 HC SEMI PRIVATE

## 2018-10-08 PROCEDURE — 6370000000 HC RX 637 (ALT 250 FOR IP): Performed by: HOSPITALIST

## 2018-10-08 PROCEDURE — 80048 BASIC METABOLIC PNL TOTAL CA: CPT

## 2018-10-08 PROCEDURE — APPSS30 APP SPLIT SHARED TIME 16-30 MINUTES: Performed by: NURSE PRACTITIONER

## 2018-10-08 PROCEDURE — 6360000002 HC RX W HCPCS: Performed by: FAMILY MEDICINE

## 2018-10-08 RX ADMIN — LEVOTHYROXINE SODIUM 25 MCG: 25 TABLET ORAL at 06:41

## 2018-10-08 RX ADMIN — PRAVASTATIN SODIUM 80 MG: 20 TABLET ORAL at 08:11

## 2018-10-08 RX ADMIN — Medication 10 ML: at 22:37

## 2018-10-08 RX ADMIN — FUROSEMIDE 20 MG: 20 TABLET ORAL at 08:11

## 2018-10-08 RX ADMIN — BECLOMETHASONE DIPROPIONATE HFA 1 PUFF: 40 AEROSOL, METERED RESPIRATORY (INHALATION) at 09:05

## 2018-10-08 RX ADMIN — METOPROLOL TARTRATE 50 MG: 50 TABLET ORAL at 08:11

## 2018-10-08 RX ADMIN — HYDROCODONE BITARTRATE AND ACETAMINOPHEN 1 TABLET: 5; 325 TABLET ORAL at 14:05

## 2018-10-08 RX ADMIN — MULTIPLE VITAMINS W/ MINERALS TAB 1 TABLET: TAB at 16:46

## 2018-10-08 RX ADMIN — FAMOTIDINE 20 MG: 20 TABLET ORAL at 08:11

## 2018-10-08 RX ADMIN — POTASSIUM CHLORIDE 20 MEQ: 20 TABLET, EXTENDED RELEASE ORAL at 22:37

## 2018-10-08 RX ADMIN — METOPROLOL TARTRATE 50 MG: 50 TABLET ORAL at 22:36

## 2018-10-08 RX ADMIN — DOCUSATE SODIUM 100 MG: 100 CAPSULE, LIQUID FILLED ORAL at 08:11

## 2018-10-08 RX ADMIN — HYDROCODONE BITARTRATE AND ACETAMINOPHEN 1 TABLET: 5; 325 TABLET ORAL at 03:14

## 2018-10-08 RX ADMIN — MORPHINE SULFATE 15 MG: 30 TABLET ORAL at 10:12

## 2018-10-08 RX ADMIN — BECLOMETHASONE DIPROPIONATE HFA 1 PUFF: 40 AEROSOL, METERED RESPIRATORY (INHALATION) at 20:28

## 2018-10-08 RX ADMIN — TRAZODONE HYDROCHLORIDE 100 MG: 50 TABLET ORAL at 22:35

## 2018-10-08 RX ADMIN — ISOSORBIDE MONONITRATE 60 MG: 60 TABLET, EXTENDED RELEASE ORAL at 08:11

## 2018-10-08 RX ADMIN — DOCUSATE SODIUM 100 MG: 100 CAPSULE, LIQUID FILLED ORAL at 22:36

## 2018-10-08 RX ADMIN — HYDROCHLOROTHIAZIDE 25 MG: 25 TABLET ORAL at 08:11

## 2018-10-08 RX ADMIN — POTASSIUM CHLORIDE 20 MEQ: 20 TABLET, EXTENDED RELEASE ORAL at 08:11

## 2018-10-08 RX ADMIN — ONDANSETRON HYDROCHLORIDE 4 MG: 2 INJECTION, SOLUTION INTRAMUSCULAR; INTRAVENOUS at 22:35

## 2018-10-08 RX ADMIN — SODIUM CHLORIDE, PRESERVATIVE FREE 100 UNITS: 5 INJECTION INTRAVENOUS at 16:46

## 2018-10-08 RX ADMIN — CYCLOBENZAPRINE HYDROCHLORIDE 5 MG: 10 TABLET, FILM COATED ORAL at 16:46

## 2018-10-08 ASSESSMENT — PAIN DESCRIPTION - DESCRIPTORS
DESCRIPTORS: ACHING;DISCOMFORT

## 2018-10-08 ASSESSMENT — PAIN SCALES - GENERAL
PAINLEVEL_OUTOF10: 0
PAINLEVEL_OUTOF10: 8
PAINLEVEL_OUTOF10: 0
PAINLEVEL_OUTOF10: 8
PAINLEVEL_OUTOF10: 0
PAINLEVEL_OUTOF10: 9
PAINLEVEL_OUTOF10: 0
PAINLEVEL_OUTOF10: 3
PAINLEVEL_OUTOF10: 0

## 2018-10-08 ASSESSMENT — PAIN DESCRIPTION - LOCATION
LOCATION: BACK;ABDOMEN
LOCATION: ABDOMEN
LOCATION: BACK

## 2018-10-08 ASSESSMENT — PAIN DESCRIPTION - PAIN TYPE
TYPE: ACUTE PAIN;SURGICAL PAIN
TYPE: CHRONIC PAIN;SURGICAL PAIN
TYPE: CHRONIC PAIN

## 2018-10-08 ASSESSMENT — PAIN DESCRIPTION - FREQUENCY: FREQUENCY: INTERMITTENT

## 2018-10-08 NOTE — PROGRESS NOTES
SpO2 98%   BMI 24.98 kg/m²   General Appearance: awake and alert, oriented, in no distress  Skin: warm and dry  Head: normocephalic and atraumatic  Pulmonary/Chest: clear throughout, respirations even and non-labored on 2L NC  Cardiovascular: regularly irregular, S1S2 present, no murmur ntoed  Abdomen: softly distended, mild tenderness, normal bowel sounds  Extremities: no edema  Neurologic: no cranial nerve deficit and speech normal        Recent Labs      10/06/18   0445  10/07/18   0434  10/08/18   0448   NA  136  134  136   K  4.4  3.8  4.5   CL  97*  97*  96*   CO2  30*  32*  32*   BUN  16  14  15   CREATININE  0.7  0.8  0.7   GLUCOSE  147*  119*  108   CALCIUM  8.8  8.2*  8.5*       Recent Labs      10/06/18   0445  10/07/18   0434  10/07/18   0630  10/08/18   0448   WBC  15.7*  14.1*   --   10.2   RBC  2.86*  2.14*   --   2.70*   HGB  8.5*  6.6*  6.6*  8.2*   HCT  27.7*  21.0*  21.1*  26.3*   MCV  96.9  98.1   --   97.4   MCH  29.7  30.8   --   30.4   MCHC  30.7*  31.4*   --   31.2*   RDW  15.9*  16.0*   --   15.9*   PLT  199  151   --   137   MPV  9.5  9.8   --   10.8       Radiology: None to Review today    Assessment:    Principal Problem:    Terminal ileitis with rectal bleeding (HCC)  Active Problems:    BRBPR (bright red blood per rectum)    Nausea and vomiting    Supratherapeutic PT INR    Abnormal chest sounds - suspect LLL pneumonia    Essential hypertension    Chronic diastolic CHF (congestive heart failure) (HCC)    CKD (chronic kidney disease)    Generalized abdominal pain    Atrial fibrillation (HCC)    CAD (coronary artery disease)    Pacemaker    Chronic anticoagulation    Valvular heart disease    Diarrhea    Hyperlipidemia    Hypothyroidism    Moderate protein-calorie malnutrition (HCC)    Acidosis    Anemia associated with acute blood loss    Colonic mass  Resolved Problems:    * No resolved hospital problems. *      Plan:  1.  Cecal Mass: s/p right hemicolectomy 10/5, continue post-op management per surgery, can stop IV fluids as tolerating oral intake  2. Acute Blood Loss Anemia Surgical: s/p PRBC, H&H has improved, continue to monitor  3. Essential Hypertension: continue medications and monitor BP closely, has been having hypotension even with IV fluids, will stop HCTZ/Lasix/Lisinopril and monitor with stopping IV fluids - can slowly re-introduce   4. Hypothyroidism: continue synthroid  5. Hyperlipidemia: continue statin  6. Atrial Fibrillation: Coumadin remains on hold - will need to check with surgery about when to restart anticoagulation  7. CAD: no anginal symptoms, telemetry remains stable, continue medications  8. Chronic Back Pain: continue home dose of MSIR, continue prn Norco and Flexeril  9. Currently on supplemental oxygen, no documented hypoxia, will start weaning oxygen as able    OK to transfer to general floor, will need to check with surgery on resuming anticoagulation, social work for discharge planning    Electronically signed by Jessica Multani.  Madhav Cuenca CNP on 10/8/2018 at 8:22 AM

## 2018-10-08 NOTE — ANESTHESIA POSTPROCEDURE EVALUATION
Department of Anesthesiology  Postprocedure Note    Patient: Estrella Gaspar  MRN: [id number]  YOB: 1928  Date of evaluation: 10/7/2018  Time:  8:59 PM     Procedure Summary     Date:  10/05/18 Room / Location:  [facility] OR 03 / [facility] OR    Anesthesia Start:  1008 Anesthesia Stop:  1158    Procedure:  LAPAROSCOPIC RIGHT FRANCY COLECTOMY (N/A ) Diagnosis:  (UNKNOWN)    Surgeon:  Don Vaughan MD Responsible Provider:  Luisa Ochoa MD    Anesthesia Type:  general ASA Status:  4          Anesthesia Type: general    Tsering Phase I: Tsering Score: 9    Tsering Phase II: Tsering Score: 9    Last vitals: Reviewed and per EMR flowsheets.        Anesthesia Post Evaluation    Patient location during evaluation: PACU  Level of consciousness: awake  Airway patency: patent  Nausea & Vomiting: no nausea and no vomiting  Complications: no  Cardiovascular status: hemodynamically stable  Respiratory status: acceptable  Hydration status: euvolemic

## 2018-10-09 VITALS
DIASTOLIC BLOOD PRESSURE: 49 MMHG | TEMPERATURE: 98.4 F | WEIGHT: 127.9 LBS | HEART RATE: 65 BPM | RESPIRATION RATE: 16 BRPM | BODY MASS INDEX: 25.11 KG/M2 | OXYGEN SATURATION: 93 % | SYSTOLIC BLOOD PRESSURE: 97 MMHG | HEIGHT: 60 IN

## 2018-10-09 LAB
ANION GAP SERPL CALCULATED.3IONS-SCNC: 10 MMOL/L (ref 7–16)
BASOPHILS ABSOLUTE: 0.01 E9/L (ref 0–0.2)
BASOPHILS RELATIVE PERCENT: 0.1 % (ref 0–2)
BUN BLDV-MCNC: 18 MG/DL (ref 8–23)
CALCIUM SERPL-MCNC: 9.3 MG/DL (ref 8.6–10.2)
CHLORIDE BLD-SCNC: 91 MMOL/L (ref 98–107)
CO2: 33 MMOL/L (ref 22–29)
CREAT SERPL-MCNC: 0.9 MG/DL (ref 0.5–1)
EOSINOPHILS ABSOLUTE: 0.16 E9/L (ref 0.05–0.5)
EOSINOPHILS RELATIVE PERCENT: 1.7 % (ref 0–6)
GFR AFRICAN AMERICAN: >60
GFR NON-AFRICAN AMERICAN: 59 ML/MIN/1.73
GLUCOSE BLD-MCNC: 158 MG/DL (ref 74–109)
HCT VFR BLD CALC: 32.2 % (ref 34–48)
HEMOGLOBIN: 9.7 G/DL (ref 11.5–15.5)
IMMATURE GRANULOCYTES #: 0.04 E9/L
IMMATURE GRANULOCYTES %: 0.4 % (ref 0–5)
INR BLD: 1.1
LYMPHOCYTES ABSOLUTE: 0.88 E9/L (ref 1.5–4)
LYMPHOCYTES RELATIVE PERCENT: 9.5 % (ref 20–42)
MCH RBC QN AUTO: 29.6 PG (ref 26–35)
MCHC RBC AUTO-ENTMCNC: 30.1 % (ref 32–34.5)
MCV RBC AUTO: 98.2 FL (ref 80–99.9)
MONOCYTES ABSOLUTE: 0.87 E9/L (ref 0.1–0.95)
MONOCYTES RELATIVE PERCENT: 9.4 % (ref 2–12)
NEUTROPHILS ABSOLUTE: 7.26 E9/L (ref 1.8–7.3)
NEUTROPHILS RELATIVE PERCENT: 78.9 % (ref 43–80)
PDW BLD-RTO: 15.6 FL (ref 11.5–15)
PLATELET # BLD: 160 E9/L (ref 130–450)
PMV BLD AUTO: 10.4 FL (ref 7–12)
POTASSIUM SERPL-SCNC: 3.8 MMOL/L (ref 3.5–5)
PROTHROMBIN TIME: 12.5 SEC (ref 9.3–12.4)
RBC # BLD: 3.28 E12/L (ref 3.5–5.5)
SODIUM BLD-SCNC: 134 MMOL/L (ref 132–146)
WBC # BLD: 9.2 E9/L (ref 4.5–11.5)

## 2018-10-09 PROCEDURE — 36415 COLL VENOUS BLD VENIPUNCTURE: CPT

## 2018-10-09 PROCEDURE — 97168 OT RE-EVAL EST PLAN CARE: CPT

## 2018-10-09 PROCEDURE — 6370000000 HC RX 637 (ALT 250 FOR IP): Performed by: NURSE PRACTITIONER

## 2018-10-09 PROCEDURE — 97535 SELF CARE MNGMENT TRAINING: CPT

## 2018-10-09 PROCEDURE — 97530 THERAPEUTIC ACTIVITIES: CPT

## 2018-10-09 PROCEDURE — 99239 HOSP IP/OBS DSCHRG MGMT >30: CPT | Performed by: INTERNAL MEDICINE

## 2018-10-09 PROCEDURE — 80048 BASIC METABOLIC PNL TOTAL CA: CPT

## 2018-10-09 PROCEDURE — 6370000000 HC RX 637 (ALT 250 FOR IP): Performed by: SURGERY

## 2018-10-09 PROCEDURE — G8987 SELF CARE CURRENT STATUS: HCPCS

## 2018-10-09 PROCEDURE — 85610 PROTHROMBIN TIME: CPT

## 2018-10-09 PROCEDURE — 85025 COMPLETE CBC W/AUTO DIFF WBC: CPT

## 2018-10-09 PROCEDURE — 6370000000 HC RX 637 (ALT 250 FOR IP): Performed by: INTERNAL MEDICINE

## 2018-10-09 PROCEDURE — APPSS45 APP SPLIT SHARED TIME 31-45 MINUTES: Performed by: NURSE PRACTITIONER

## 2018-10-09 PROCEDURE — 94640 AIRWAY INHALATION TREATMENT: CPT

## 2018-10-09 PROCEDURE — 6360000002 HC RX W HCPCS: Performed by: FAMILY MEDICINE

## 2018-10-09 PROCEDURE — G8988 SELF CARE GOAL STATUS: HCPCS

## 2018-10-09 PROCEDURE — 2580000003 HC RX 258: Performed by: FAMILY MEDICINE

## 2018-10-09 PROCEDURE — 97164 PT RE-EVAL EST PLAN CARE: CPT

## 2018-10-09 RX ORDER — SPIRONOLACTONE 25 MG/1
12.5 TABLET ORAL DAILY
Qty: 30 TABLET | Refills: 0 | Status: ON HOLD | OUTPATIENT
Start: 2018-10-10 | End: 2019-12-20 | Stop reason: HOSPADM

## 2018-10-09 RX ORDER — MORPHINE SULFATE 15 MG/1
15 TABLET ORAL 2 TIMES DAILY
Qty: 10 TABLET | Refills: 0 | Status: SHIPPED | OUTPATIENT
Start: 2018-10-09 | End: 2018-10-14

## 2018-10-09 RX ORDER — HYDROCODONE BITARTRATE AND ACETAMINOPHEN 10; 325 MG/1; MG/1
1 TABLET ORAL EVERY 6 HOURS PRN
Qty: 20 TABLET | Refills: 0 | Status: SHIPPED | OUTPATIENT
Start: 2018-10-09 | End: 2018-10-14

## 2018-10-09 RX ORDER — CYCLOBENZAPRINE HCL 5 MG
5 TABLET ORAL 3 TIMES DAILY PRN
Qty: 30 TABLET | Refills: 0 | Status: SHIPPED | OUTPATIENT
Start: 2018-10-09 | End: 2018-10-12

## 2018-10-09 RX ORDER — SPIRONOLACTONE 25 MG/1
12.5 TABLET ORAL DAILY
Status: DISCONTINUED | OUTPATIENT
Start: 2018-10-09 | End: 2018-10-09 | Stop reason: HOSPADM

## 2018-10-09 RX ORDER — WARFARIN SODIUM 5 MG/1
5 TABLET ORAL DAILY
Status: DISCONTINUED | OUTPATIENT
Start: 2018-10-09 | End: 2018-10-09

## 2018-10-09 RX ORDER — PRAVASTATIN SODIUM 40 MG
40 TABLET ORAL NIGHTLY
Qty: 30 TABLET | Refills: 0 | Status: SHIPPED | OUTPATIENT
Start: 2018-10-10 | End: 2022-11-02 | Stop reason: ALTCHOICE

## 2018-10-09 RX ORDER — PRAVASTATIN SODIUM 20 MG
40 TABLET ORAL NIGHTLY
Status: DISCONTINUED | OUTPATIENT
Start: 2018-10-10 | End: 2018-10-09 | Stop reason: HOSPADM

## 2018-10-09 RX ORDER — WARFARIN SODIUM 2.5 MG/1
TABLET ORAL
Qty: 30 TABLET | Refills: 3 | Status: ON HOLD | OUTPATIENT
Start: 2018-10-09 | End: 2019-12-20 | Stop reason: HOSPADM

## 2018-10-09 RX ORDER — FAMOTIDINE 20 MG/1
20 TABLET, FILM COATED ORAL DAILY
Qty: 30 TABLET | Refills: 3 | Status: ON HOLD | OUTPATIENT
Start: 2018-10-10 | End: 2019-12-20 | Stop reason: HOSPADM

## 2018-10-09 RX ORDER — WARFARIN SODIUM 2.5 MG/1
2.5 TABLET ORAL DAILY
Status: DISCONTINUED | OUTPATIENT
Start: 2018-10-09 | End: 2018-10-09 | Stop reason: HOSPADM

## 2018-10-09 RX ADMIN — MULTIPLE VITAMINS W/ MINERALS TAB 1 TABLET: TAB at 17:02

## 2018-10-09 RX ADMIN — PRAVASTATIN SODIUM 80 MG: 20 TABLET ORAL at 08:24

## 2018-10-09 RX ADMIN — FAMOTIDINE 20 MG: 20 TABLET ORAL at 08:24

## 2018-10-09 RX ADMIN — SPIRONOLACTONE 12.5 MG: 25 TABLET ORAL at 12:30

## 2018-10-09 RX ADMIN — DOCUSATE SODIUM 100 MG: 100 CAPSULE, LIQUID FILLED ORAL at 08:25

## 2018-10-09 RX ADMIN — BECLOMETHASONE DIPROPIONATE HFA 1 PUFF: 40 AEROSOL, METERED RESPIRATORY (INHALATION) at 07:53

## 2018-10-09 RX ADMIN — CYCLOBENZAPRINE HYDROCHLORIDE 5 MG: 10 TABLET, FILM COATED ORAL at 08:24

## 2018-10-09 RX ADMIN — LEVOTHYROXINE SODIUM 25 MCG: 25 TABLET ORAL at 06:16

## 2018-10-09 RX ADMIN — Medication 10 ML: at 08:24

## 2018-10-09 RX ADMIN — ISOSORBIDE MONONITRATE 60 MG: 60 TABLET, EXTENDED RELEASE ORAL at 08:25

## 2018-10-09 RX ADMIN — METOPROLOL TARTRATE 50 MG: 50 TABLET ORAL at 08:25

## 2018-10-09 RX ADMIN — CYCLOBENZAPRINE HYDROCHLORIDE 5 MG: 10 TABLET, FILM COATED ORAL at 00:08

## 2018-10-09 RX ADMIN — MORPHINE SULFATE 15 MG: 30 TABLET ORAL at 08:24

## 2018-10-09 RX ADMIN — WARFARIN SODIUM 2.5 MG: 2.5 TABLET ORAL at 17:02

## 2018-10-09 RX ADMIN — CYCLOBENZAPRINE HYDROCHLORIDE 5 MG: 10 TABLET, FILM COATED ORAL at 17:02

## 2018-10-09 RX ADMIN — POTASSIUM CHLORIDE 20 MEQ: 20 TABLET, EXTENDED RELEASE ORAL at 08:25

## 2018-10-09 RX ADMIN — SODIUM CHLORIDE, PRESERVATIVE FREE 100 UNITS: 5 INJECTION INTRAVENOUS at 04:29

## 2018-10-09 RX ADMIN — HYDROCODONE BITARTRATE AND ACETAMINOPHEN 1 TABLET: 5; 325 TABLET ORAL at 12:35

## 2018-10-09 ASSESSMENT — PAIN SCALES - GENERAL
PAINLEVEL_OUTOF10: 8
PAINLEVEL_OUTOF10: 1
PAINLEVEL_OUTOF10: 8
PAINLEVEL_OUTOF10: 0
PAINLEVEL_OUTOF10: 0

## 2018-10-09 ASSESSMENT — PAIN DESCRIPTION - PAIN TYPE
TYPE: CHRONIC PAIN
TYPE: CHRONIC PAIN

## 2018-10-09 ASSESSMENT — PAIN DESCRIPTION - PROGRESSION
CLINICAL_PROGRESSION: NOT CHANGED
CLINICAL_PROGRESSION: NOT CHANGED

## 2018-10-09 ASSESSMENT — PAIN DESCRIPTION - LOCATION
LOCATION: BACK
LOCATION: BACK

## 2018-10-09 ASSESSMENT — PAIN DESCRIPTION - ONSET
ONSET: ON-GOING
ONSET: ON-GOING

## 2018-10-09 ASSESSMENT — PAIN DESCRIPTION - DESCRIPTORS
DESCRIPTORS: ACHING;DISCOMFORT;SORE
DESCRIPTORS: ACHING;DISCOMFORT;SORE

## 2018-10-09 ASSESSMENT — PAIN DESCRIPTION - FREQUENCY
FREQUENCY: CONTINUOUS
FREQUENCY: CONTINUOUS

## 2018-10-09 NOTE — PROGRESS NOTES
PROGRESS NOTE       PATIENT PROBLEM LIST:  Principal Problem:    Terminal ileitis with rectal bleeding ([address]  Active Problems:    Generalized abdominal pain    Atrial fibrillation (HCC)    CAD (coronary artery disease)    Pacemaker    Chronic anticoagulation    Valvular heart disease    BRBPR (bright red blood per rectum)    Diarrhea    Nausea and vomiting    Supratherapeutic PT INR    Essential hypertension    Chronic diastolic CHF (congestive heart failure) (HCC)    CKD (chronic kidney disease)    Hyperlipidemia    Hypothyroidism    Abnormal chest sounds - suspect LLL pneumonia    Moderate protein-calorie malnutrition (HCC)    Acidosis    Anemia associated with acute blood loss    Colonic mass  Resolved Problems:    * No resolved hospital problems. *      SUBJECTIVE:  Jorge Kemp states she feels weak but is sitting up in her chair denies shortness of breath presently. REVIEW OF SYSTEMS:  General ROS: positive for fatigue, malaise,  weight gain or weight loss  Psychological ROS: negative for - anxiety , depression  Ophthalmic ROS: negative for - decreased vision or visual distortion. ENT ROS: negative  Allergy and Immunology ROS: negative  Hematological and Lymphatic ROS: negative  Endocrine: no heat or cold intolerance and no polyphagia, polydipsia, or polyuria  Respiratory ROS: positive for - shortness of breath  Cardiovascular ROS: positive for - irregular heartbeat, claudication. Gastrointestinal ROS: positive for-mild abdominal pain, change in bowel habits, black or bloody stools  Genito-Urinary ROS: no nocturia, dysuria, trouble voiding, frequency or hematuria  Musculoskeletal ROS: negative for- myalgias, arthralgias, or claudication  Neurological ROS: no TIA or stroke symptoms otherwise no significant change in symptoms or problems since yesterday as documented in previous progress notes.     SCHEDULED MEDICATIONS:   metoprolol tartrate  25 mg Oral BID    spironolactone  12.5 mg Oral Daily    related to terminal ileal inflammatory change. Small bowel measures up to 3.9 cm in the abdomen elsewhere. A small amount of extraluminal fluid is noted in the right lower abdomen, likely extending from the distal ileum. 2. The colon shows diffuse diverticulosis coli of the descending and pelvic colonic segments without acute complications. 3. At present, there is no evidence of a perforated viscus. A small amount of perihepatic fluid is identified. 4. There is very prominent cardiomegaly without evidence of acute decompensation. 5. There is a trace right-sided pleural effusion, and density in the retrocardiac left lower lobe is suspicious for early interstitial pneumonia. Xr Chest Standard (2 Vw)    Result Date: 2018  Patient MRN:  [id number] : 1928 Age: [address] years Gender: Female Order Date:  2018 7:00 AM EXAM: XR CHEST (2 VW) NUMBER OF IMAGES:  3 INDICATION:  F/u LLL pneumonia Technique: PA and lateral views of the chest obtained on 2018 7:00 AM Comparison:  Comparison is made to AP view of the chest dated 2015. Findings: The cardiomediastinal silhouette is enlarged with calcifications of the thoracic aorta. The hilar and mediastinal contours are normal. There is a left-sided pleural effusion with associated left lower lobe airspace opacity. There is no pneumothorax. The visualized osseous structures demonstrate degenerative changes. Midline sternotomy wires and metallic mediastinal hardware are identified. Lines and Tubes: A left-sided dual-lead cardiac pacer is in place, with leads terminating in satisfactory position. Left-sided pleural effusion with associated left lower lobe airspace opacity. Recommend clinical correlation.     Xr Acute Abd Series Chest 1 Vw    Result Date: 2018  Patient MRN:  [id number] : 1928 Age: [address] years Gender: Female Order Date:  2018 12:00 AM TECHNIQUE/NUMBER OF IMAGES/COMPARISON/CLINICAL HISTORY: Abdomen supine and upright views with

## 2018-10-09 NOTE — PROGRESS NOTES
BID    heparin flush  100 Units Intravenous Q12H    sodium chloride flush  10 mL Intravenous 2 times per day    morphine  15 mg Oral Q12H    isosorbide mononitrate  60 mg Oral Daily    levothyroxine  25 mcg Oral Daily    therapeutic multivitamin-minerals  1 tablet Oral Dinner    traZODone  100 mg Oral Nightly    famotidine  20 mg Oral Daily    beclomethasone  1 puff Inhalation BID    cyclobenzaprine  5 mg Oral Q8H       VITAL SIGNS:                                                                                                                          BP (!) 127/56   Pulse 62   Temp 98.7 °F (37.1 °C) (Oral)   Resp 16   Ht 5' (1.524 m)   Wt 127 lb 14.4 oz (58 kg)   SpO2 93%   BMI 24.98 kg/m²   Patient Vitals for the past 96 hrs (Last 3 readings):   Weight   10/08/18 0349 127 lb 14.4 oz (58 kg)   10/07/18 0414 130 lb 9.6 oz (59.2 kg)     OBJECTIVE:    HEENT: PERRL, EOM  Intact; sclera non-icteric, conjunctiva pink. Carotids are brisk in upstroke with normal contour. No carotid bruits. Normal jugular venous pulsation at 45°. No palpable cervical nor supraclavicular nodes. Thyroid not palpable. Trachea midline. Chest: Even excursion  Lungs: CTA B, no expiratory wheezes or rhonchi, no decreased tactile fremitus without inspiratory rales. Heart: Regular  rhythm; S1 > S2, no gallop or murmur. No clicks, rub, palpable thrills   or heaves. PMI nondisplaced, 5th intercostal space MCL. Abdomen: Soft, nontender, nondistended,  {Blank single:90015::\"scaphoid\",\"mildly protuberant\",\"moderately protuberant\",\"grossly protuberant\"}, no masses or organomegaly. Bowel sounds active. Extremities: Without clubbing, cyanosis or edema. Pulses present 3+ upper extermities bilaterally; {POSITIVE-NEGATIVE PULSES:98647} DP and {POSITIVE-NEGATIVE PULSES:07604} PT bilaterally.      Data:   Scheduled Meds: Reviewed  Continuous Infusions:     Intake/Output Summary (Last 24 hours) at 10/09/18 1138  Last data filed at 10/08/18 of ileum throughout, possibly indicating a functional outflow obstruction versus ileus related to terminal ileal inflammatory change. Small bowel measures up to 3.9 cm in the abdomen elsewhere. A small amount of extraluminal fluid is noted in the right lower abdomen, likely extending from the distal ileum. 2. The colon shows diffuse diverticulosis coli of the descending and pelvic colonic segments without acute complications. 3. At present, there is no evidence of a perforated viscus. A small amount of perihepatic fluid is identified. 4. There is very prominent cardiomegaly without evidence of acute decompensation. 5. There is a trace right-sided pleural effusion, and density in the retrocardiac left lower lobe is suspicious for early interstitial pneumonia. Xr Chest Standard (2 Vw)    Result Date: 2018  Patient MRN:  [id number] : 1928 Age: 80 years Gender: Female Order Date:  2018 7:00 AM EXAM: XR CHEST (2 VW) NUMBER OF IMAGES:  3 INDICATION:  F/u LLL pneumonia Technique: PA and lateral views of the chest obtained on 2018 7:00 AM Comparison:  Comparison is made to AP view of the chest dated 2015. Findings: The cardiomediastinal silhouette is enlarged with calcifications of the thoracic aorta. The hilar and mediastinal contours are normal. There is a left-sided pleural effusion with associated left lower lobe airspace opacity. There is no pneumothorax. The visualized osseous structures demonstrate degenerative changes. Midline sternotomy wires and metallic mediastinal hardware are identified. Lines and Tubes: A left-sided dual-lead cardiac pacer is in place, with leads terminating in satisfactory position. Left-sided pleural effusion with associated left lower lobe airspace opacity. Recommend clinical correlation.     Xr Acute Abd Series Chest 1 Vw    Result Date: 2018  Patient MRN:  [id number] : 1928 Age: 80 years Gender: Female Order Date:  2018 12:00 AM TECHNIQUE/NUMBER OF IMAGES/COMPARISON/CLINICAL HISTORY: Abdomen supine and upright views with frontal view of the chest. 3 images 4 views. History is follow obstruction. FINDINGS: Patient previous median sternotomy sternotomy. There is a permanent left-sided pacemaker with 2 wires. Cardiac area is enlarged. CTR: 16.6/24.7 cm. Small bilateral pleural effusions are seen. There is no free intraperitoneal air. There is some air distention of the bowel but there is no dilatation. There is some air-fluid levels in upright positioning the right-sided colon and possibly some of the small bowel segments. There is no pattern for significant degree of constipation oblique component of fecal rectal rotation cannot be excluded. Retroperitoneal fat planes are preserved. No calcifications seen in the projection for the kidneys. Cannot excluded mild degree of ileus. There is however no pattern for obstruction or constipation. Please correlate clinically. Xr Chest Portable    Result Date: 10/3/2018  Patient MRN: [id number] : 1928 Age:  80 years Gender: Female Order Date: 10/3/2018 1:30 PM Exam: XR CHEST PORTABLE Number of Views: 1 Indication:   Evaluation Comparison: Radiographs 2018 Findings: There is a stable, enlarged cardiomediastinal silhouette with abnormal airspace opacification in the left hilar left lung base as well as the right lung base. Suspected underlying mild central pulmonary vascular congestion. Vascular calcifications thoracic aorta. Median sternotomy wires with dual-lead cardiac device device. Osteopenia. There is a possible venous catheter noted overlying the right axillary region. [place] No pneumothorax. 1. Indeterminate airspace disease in left hilar and bilateral lung bases. Clinical correlation recommended. Findings were present on the 2018 examination. 2. Stable, enlarged cardiomediastinal silhouette. . 3. Suspected underlying mild central pulmonary vascular congestion.  4. Possible venous catheter overlying the right axillary region, clinical correlation recommended for desired location. EKG: See Report  Echo: See Report      IMPRESSIONS:  Principal Problem:    Terminal ileitis with rectal bleeding (HCC)  Active Problems:    Generalized abdominal pain    Atrial fibrillation (HCC)    CAD (coronary artery disease)    Pacemaker    Chronic anticoagulation    Valvular heart disease    BRBPR (bright red blood per rectum)    Diarrhea    Nausea and vomiting    Supratherapeutic PT INR    Essential hypertension    Chronic diastolic CHF (congestive heart failure) (HCC)    CKD (chronic kidney disease)    Hyperlipidemia    Hypothyroidism    Abnormal chest sounds - suspect LLL pneumonia    Moderate protein-calorie malnutrition (HCC)    Acidosis    Anemia associated with acute blood loss    Colonic mass  Resolved Problems:    * No resolved hospital problems. *      RECOMMENDATIONS:  ***    I have spent more than *** minutes face to face with Julieta Haring and reviewing notes and laboratory data, with greater than 50% of this time instructing and counseling the patient *** face to face regarding my findings and recommendations and I have answered all questions as posed to me by Ms. Dumont. Elena Bales, DO FACP,FACC,FSCAI      NOTE:  This report was transcribed using voice recognition software.   Every effort was made to ensure accuracy; however, inadvertent computerized transcription errors may be present

## 2018-10-09 NOTE — CARE COORDINATION
Social Work discharge planning  Pt accepted by [facility] skilled today per [employer]. Pt, and 2 dtrs in room [place] aware of plan for pt to go to [facility] skilled today around 6p. Sw called [facility] ambulance per [facility]'s advisement [phone]. Await their call back to see if they can transport pt today around 6p. If not, SW or RN can try [facility] [phone], [facility] [phone] or [facility] .   Electronically signed by Cee Martínez on 10/9/2018 at 3:28 PM

## 2018-10-09 NOTE — CARE COORDINATION
Social Work discharge planning   Pt and family now prefer [facility] instead of [facility] due to location. SW called refferal to [place] with [facility] snf. Await their eval for discharge today. If no bed at [facility], [facility] snf can accept pt today.   Electronically signed by Sujit Arevalo on 10/9/2018 at 11:38 AM

## 2018-10-09 NOTE — DISCHARGE SUMMARY
CONTRAST NUMBER OF IMAGES \ views:  377 INDICATION: Generalized abdominal pain with melena, IBS patient COMPARISON: Prior CT abdomen and pelvis with contrast November 17, 2015, and another April 10, 2015 TECHNIQUE: Axial computerized tomography sections of the abdomen with sagittal and coronal MPR reconstructions were obtained from the lower chest to the pelvic floor without intravenous contrast. Low-dose CT acquisition technique included one of following options; 1 . Automated exposure control, 2. Adjustment of MA and or KV according to patient's size or 3. Use of iterative reconstruction. FINDINGS: CHEST: The lung bases are remarkable for small dependent water density effusion on the right, and there is subtle interstitial density in the retrocardiac left lower lung, which may represent early pneumonia. There is prominent cardiomegaly with left-sided atrial and ventricular chamber predominance, and evidence of prior surgery with dense coronary and aortic root calcification is documented. Multipolar pacemaker leads are documented. There is no evidence of acute cardiac decompensation. Patti Belts LIVER: The liver is slightly diminished in size without focal parenchymal abnormalities. There is some perihepatic moderate density fluid suggesting relatively high protein content. GALLBLADDER AND BILIARY TREE: No evidence of acute inflammation, gallstones, or biliary ductal ectasia. SPLEEN: Normal ADRENALS:  Normal bilaterally PANCREAS: No evidence of acute inflammation. Fatty atrophy is noted. KIDNEYS/BLADDER: Noncontrast imaging shows limited visceral detail. There is some cortical atrophy, greater on the left. There is asymmetric prominence of the right renal pelvis and intrarenal collecting system, but ureters are similar in course and caliber.  Bladder contours are normal. APPENDIX:  Surgically removed BOWEL:  Prominent fluid-filled distal ileal loops are noted throughout the abdomen, suggesting ileus versus distal partial small bowel obstruction. The terminal ileum is not distended with fluid the colon is likewise not distended. Diverticular involvement of the sigmoid colon is prominent throughout. There is no evidence of acute inflammatory complications, however. PELVIS: There is no dependent free pelvic fluid or intrapelvic adenopathy. The patient is post hysterectomy. LYMPHATICS: There is no mesenteric or retroperitoneal adenopathy. VASCULAR: There is dense and extensive atherosclerotic arterial calcification. SKELETAL: There is severe levo and levorotatory scoliotic curvature of the lumbar spine with the apex at the L3 level. 1. The terminal ileum appears thickened for a short segment at the ileocecal junction, and proximal to this, there is prominent fluid distention of ileum throughout, possibly indicating a functional outflow obstruction versus ileus related to terminal ileal inflammatory change. Small bowel measures up to 3.9 cm in the abdomen elsewhere. A small amount of extraluminal fluid is noted in the right lower abdomen, likely extending from the distal ileum. 2. The colon shows diffuse diverticulosis coli of the descending and pelvic colonic segments without acute complications. 3. At present, there is no evidence of a perforated viscus. A small amount of perihepatic fluid is identified. 4. There is very prominent cardiomegaly without evidence of acute decompensation. 5. There is a trace right-sided pleural effusion, and density in the retrocardiac left lower lobe is suspicious for early interstitial pneumonia.        Patient Instructions:   Current Discharge Medication List      START taking these medications    Details   beclomethasone (QVAR REDIHALER) 40 MCG/ACT AERB inhaler Inhale 1 puff into the lungs 2 times daily  Qty: 60 puff, Refills: 0      spironolactone (ALDACTONE) 25 MG tablet Take 0.5 tablets by mouth daily  Qty: 30 tablet, Refills: 0      cyclobenzaprine (FLEXERIL) 5 MG tablet Take 1 tablet by mouth 3 MCG/ACT inhaler Comments:   Reason for Stopping:         escitalopram (LEXAPRO) 10 MG tablet Comments:   Reason for Stopping:         CRANBERRY PO Comments:   Reason for Stopping:         furosemide (LASIX) 20 MG tablet Comments:   Reason for Stopping:         potassium chloride SA (K-DUR;KLOR-CON) 20 MEQ tablet Comments:   Reason for Stopping:                 Note that more than 30 minutes was spent in preparing discharge papers, discussing discharge with patient, medication review, etc.    Signed:  Electronically signed by ALEXA Bernardo on 10/9/2018 at 3:49 PM

## 2018-11-21 NOTE — OP NOTE
anastomosis was created using  the JUDE-75 and a TA-60.  _____ stitch was placed using 3-0 silk. No  other gross abnormalities were noted. Fascia was closed with 0 loop PDS  x2. All the incisions were closed with gustavo.     Halie Franco MD    D: 10/05/2018 17:94:48       T: 10/06/2018 1:07:01     [email or web address]  Job#: [id number]     Doc#: [id number]    CC:

## 2019-06-23 ENCOUNTER — APPOINTMENT (OUTPATIENT)
Dept: GENERAL RADIOLOGY | Age: 84
End: 2019-06-23
Payer: MEDICARE

## 2019-06-23 ENCOUNTER — HOSPITAL ENCOUNTER (EMERGENCY)
Age: 84
Discharge: HOME | End: 2019-06-23
Attending: EMERGENCY MEDICINE
Payer: MEDICARE

## 2019-06-23 ENCOUNTER — APPOINTMENT (OUTPATIENT)
Dept: CT IMAGING | Age: 84
End: 2019-06-23
Payer: MEDICARE

## 2019-06-23 VITALS
TEMPERATURE: 98.2 F | BODY MASS INDEX: 25.52 KG/M2 | OXYGEN SATURATION: 95 % | HEIGHT: 60 IN | SYSTOLIC BLOOD PRESSURE: 189 MMHG | RESPIRATION RATE: 14 BRPM | HEART RATE: 70 BPM | DIASTOLIC BLOOD PRESSURE: 72 MMHG | WEIGHT: 130 LBS

## 2019-06-23 DIAGNOSIS — E87.5 HYPERKALEMIA: ICD-10-CM

## 2019-06-23 DIAGNOSIS — N18.9 CHRONIC KIDNEY DISEASE, UNSPECIFIED CKD STAGE: Primary | ICD-10-CM
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
## 2019-06-23 LAB
ANION GAP SERPL CALCULATED.3IONS-SCNC: 10 MMOL/L (ref 7–16)
BASOPHILS ABSOLUTE: 0.05 E9/L (ref 0–0.2)
BASOPHILS RELATIVE PERCENT: 0.7 % (ref 0–2)
BUN BLDV-MCNC: 39 MG/DL (ref 8–23)
CALCIUM SERPL-MCNC: 9.5 MG/DL (ref 8.6–10.2)
CHLORIDE BLD-SCNC: 101 MMOL/L (ref 98–107)
CO2: 25 MMOL/L (ref 22–29)
CREAT SERPL-MCNC: 1.4 MG/DL (ref 0.5–1)
EOSINOPHILS ABSOLUTE: 0.23 E9/L (ref 0.05–0.5)
EOSINOPHILS RELATIVE PERCENT: 3.2 % (ref 0–6)
GFR AFRICAN AMERICAN: 43
GFR NON-AFRICAN AMERICAN: 35 ML/MIN/1.73
GLUCOSE BLD-MCNC: 112 MG/DL (ref 74–99)
HCT VFR BLD CALC: 33.5 % (ref 34–48)
HEMOGLOBIN: 10.6 G/DL (ref 11.5–15.5)
IMMATURE GRANULOCYTES #: 0.06 E9/L
IMMATURE GRANULOCYTES %: 0.8 % (ref 0–5)
INR BLD: 2.2
LYMPHOCYTES ABSOLUTE: 1.7 E9/L (ref 1.5–4)
LYMPHOCYTES RELATIVE PERCENT: 23.8 % (ref 20–42)
MCH RBC QN AUTO: 30.6 PG (ref 26–35)
MCHC RBC AUTO-ENTMCNC: 31.6 % (ref 32–34.5)
MCV RBC AUTO: 96.8 FL (ref 80–99.9)
MONOCYTES ABSOLUTE: 0.8 E9/L (ref 0.1–0.95)
MONOCYTES RELATIVE PERCENT: 11.2 % (ref 2–12)
NEUTROPHILS ABSOLUTE: 4.29 E9/L (ref 1.8–7.3)
NEUTROPHILS RELATIVE PERCENT: 60.3 % (ref 43–80)
PDW BLD-RTO: 15 FL (ref 11.5–15)
PLATELET # BLD: 185 E9/L (ref 130–450)
PMV BLD AUTO: 9.8 FL (ref 7–12)
POTASSIUM SERPL-SCNC: 5.2 MMOL/L (ref 3.5–5)
PROTHROMBIN TIME: 24.8 SEC (ref 9.3–12.4)
RBC # BLD: 3.46 E12/L (ref 3.5–5.5)
SODIUM BLD-SCNC: 136 MMOL/L (ref 132–146)
WBC # BLD: 7.1 E9/L (ref 4.5–11.5)

## 2019-06-23 PROCEDURE — 73130 X-RAY EXAM OF HAND: CPT

## 2019-06-23 PROCEDURE — 80048 BASIC METABOLIC PNL TOTAL CA: CPT

## 2019-06-23 PROCEDURE — 73502 X-RAY EXAM HIP UNI 2-3 VIEWS: CPT

## 2019-06-23 PROCEDURE — 70450 CT HEAD/BRAIN W/O DYE: CPT

## 2019-06-23 PROCEDURE — 85025 COMPLETE CBC W/AUTO DIFF WBC: CPT

## 2019-06-23 PROCEDURE — 73552 X-RAY EXAM OF FEMUR 2/>: CPT

## 2019-06-23 PROCEDURE — 2580000003 HC RX 258: Performed by: EMERGENCY MEDICINE

## 2019-06-23 PROCEDURE — 85610 PROTHROMBIN TIME: CPT

## 2019-06-23 PROCEDURE — 6370000000 HC RX 637 (ALT 250 FOR IP): Performed by: EMERGENCY MEDICINE

## 2019-06-23 PROCEDURE — 99284 EMERGENCY DEPT VISIT MOD MDM: CPT

## 2019-06-23 RX ORDER — ACETAMINOPHEN 500 MG
1000 TABLET ORAL ONCE
Status: COMPLETED | OUTPATIENT
Start: 2019-06-23 | End: 2019-06-23

## 2019-06-23 RX ORDER — 0.9 % SODIUM CHLORIDE 0.9 %
500 INTRAVENOUS SOLUTION INTRAVENOUS ONCE
Status: COMPLETED | OUTPATIENT
Start: 2019-06-23 | End: 2019-06-23

## 2019-06-23 RX ADMIN — SODIUM CHLORIDE 500 ML: 9 INJECTION, SOLUTION INTRAVENOUS at 18:29

## 2019-06-23 RX ADMIN — ACETAMINOPHEN 1000 MG: 500 TABLET ORAL at 17:48

## 2019-06-23 ASSESSMENT — PAIN DESCRIPTION - PROGRESSION: CLINICAL_PROGRESSION: GRADUALLY IMPROVING

## 2019-06-23 ASSESSMENT — ENCOUNTER SYMPTOMS
VOMITING: 0
SHORTNESS OF BREATH: 0
EYE PAIN: 0
NAUSEA: 0
EYE DISCHARGE: 0
BACK PAIN: 0
WHEEZING: 0
SORE THROAT: 0
DIARRHEA: 0
COUGH: 0
SINUS PRESSURE: 0
EYE REDNESS: 0
ABDOMINAL DISTENTION: 0

## 2019-06-23 ASSESSMENT — PAIN DESCRIPTION - LOCATION: LOCATION: HIP

## 2019-06-23 ASSESSMENT — PAIN DESCRIPTION - DESCRIPTORS: DESCRIPTORS: PATIENT UNABLE TO DESCRIBE

## 2019-06-23 ASSESSMENT — PAIN SCALES - GENERAL
PAINLEVEL_OUTOF10: 8
PAINLEVEL_OUTOF10: 8
PAINLEVEL_OUTOF10: 7

## 2019-06-23 ASSESSMENT — PAIN DESCRIPTION - FREQUENCY: FREQUENCY: CONTINUOUS

## 2019-06-23 ASSESSMENT — PAIN - FUNCTIONAL ASSESSMENT: PAIN_FUNCTIONAL_ASSESSMENT: PREVENTS OR INTERFERES SOME ACTIVE ACTIVITIES AND ADLS

## 2019-06-23 ASSESSMENT — PAIN DESCRIPTION - ORIENTATION: ORIENTATION: RIGHT

## 2019-06-23 ASSESSMENT — PAIN DESCRIPTION - ONSET: ONSET: SUDDEN

## 2019-06-23 NOTE — ED PROVIDER NOTES
80-year-old female presenting status post fall. She states she lost her balance and trying to kiss her granddaughter on the head. She landed without loss of consciousness. She is on Coumadin. She complains of pain in her left head, right hip, right hand. She denies any nausea or vomiting. Verbalizes no other complaints. The history is provided by the patient and a relative. Review of Systems   Constitutional: Negative for chills and fever. HENT: Negative for ear pain, sinus pressure and sore throat. Eyes: Negative for pain, discharge and redness. Respiratory: Negative for cough, shortness of breath and wheezing. Cardiovascular: Negative for chest pain. Gastrointestinal: Negative for abdominal distention, diarrhea, nausea and vomiting. Genitourinary: Negative for dysuria and frequency. Musculoskeletal: Negative for arthralgias and back pain. Right hand pain, right hip pain   Skin: Negative for rash and wound. Neurological: Positive for headaches. Negative for weakness. Hematological: Negative for adenopathy. All other systems reviewed and are negative. Physical Exam   Constitutional: She is oriented to person, place, and time. She appears well-developed and well-nourished. HENT:   Head: Normocephalic and atraumatic. Eyes: Pupils are equal, round, and reactive to light. Neck: Normal range of motion. Neck supple. Cardiovascular: Normal rate, regular rhythm and normal heart sounds. No murmur heard. Pulmonary/Chest: Effort normal and breath sounds normal. No respiratory distress. She has no wheezes. She has no rales. Abdominal: Soft. Bowel sounds are normal. There is no tenderness. There is no rebound and no guarding. Musculoskeletal: She exhibits tenderness. She exhibits no edema. Tenderness to palpation on posterior aspect of the patient's right femur. Tenderness and bruising noted along the dorsal aspect of the patient's right hand.   There is a approximately 3 cm x 1/2 cm area of induration of the patient's parietal scalp on the left side. There is no active bleeding noted. No other abrasions or bruising are appreciated on exam.   Neurological: She is alert and oriented to person, place, and time. No cranial nerve deficit. Coordination normal.   Skin: Skin is warm and dry. Nursing note and vitals reviewed. Procedures    MDM  Number of Diagnoses or Management Options  Chronic kidney disease, unspecified CKD stage:   Fall from standing, initial encounter:   Hyperkalemia:   Diagnosis management comments: 61-year-old female status post fall on Coumadin. Discomfort were noted and these were evaluated by CT and x-ray. There is no acute process identified. Basic lab testing was performed as a screening measure noted the patient does have evidence of possible chronic kidney disease versus DILAN versus CKD. I reviewed the patient's chart, as noted she does have a history of prior chronic kidney disease. This was discussed with the patient there was concern for further deterioration based on the patient not having primary care provider here. Patient notes that she is traveling to [place]. Patient was given a lab slip for repeat basic metabolic panel on Tuesday and was recommended to follow-up with her PCP in [place] as soon as possible. She is return to this emergency department if her symptoms worsen.       --------------------------------------------- PAST HISTORY ---------------------------------------------  Past Medical History:  has a past medical history of A-fib ([facility] Arthritis, Atrial fibrillation ([facility] Blood transfusion, Blood transfusion reaction, CAD (coronary artery disease), Cancer ([address] CHF (congestive heart failure) ([facility] Chronic back pain, Chronic kidney disease, GERD (gastroesophageal reflux disease), GI bleed, History of blood transfusion, Hyperlipidemia, Hypertension, Pacemaker, Psychiatric problem, Syncope, and Thyroid disease. Past Surgical History:  has a past surgical history that includes Cardiac pacemaker placement (3/8/11); skin biopsy; ECHO Compl W Dop Color Flow (5/23/2012); Coronary artery bypass graft; Coronary angioplasty with stent (2009); Colonoscopy; Tonsillectomy; Appendectomy; Hysterectomy; Endoscopy, colon, diagnostic; Cardioversion (06/28/2012); Cardiac catheterization (03/13/2014); Breast surgery; eye surgery; pacemaker placement; Colonoscopy (10/3/2018); and pr lap,surg,colectomy, partial, w/anast (N/A, 10/5/2018). Social History:  reports that she has never smoked. She has never used smokeless tobacco. She reports that she does not drink alcohol or use drugs. Family History: family history includes Cancer in her sister; Heart Disease in her brother, father, and mother; Stroke in her brother and father. The patients home medications have been reviewed. Allergies: Sulfa antibiotics; Adhesive tape;  Bactrim; and Pcn [penicillins]    -------------------------------------------------- RESULTS -------------------------------------------------  Labs:  Results for orders placed or performed during the hospital encounter of 06/23/19   CBC Auto Differential   Result Value Ref Range    WBC 7.1 4.5 - 11.5 E9/L    RBC 3.46 (L) 3.50 - 5.50 E12/L    Hemoglobin 10.6 (L) 11.5 - 15.5 g/dL    Hematocrit 33.5 (L) 34.0 - 48.0 %    MCV 96.8 80.0 - 99.9 fL    MCH 30.6 26.0 - 35.0 pg    MCHC 31.6 (L) 32.0 - 34.5 %    RDW 15.0 11.5 - 15.0 fL    Platelets 815 972 - 205 E9/L    MPV 9.8 7.0 - 12.0 fL    Neutrophils % 60.3 43.0 - 80.0 %    Immature Granulocytes % 0.8 0.0 - 5.0 %    Lymphocytes % 23.8 20.0 - 42.0 %    Monocytes % 11.2 2.0 - 12.0 %    Eosinophils % 3.2 0.0 - 6.0 %    Basophils % 0.7 0.0 - 2.0 %    Neutrophils # 4.29 1.80 - 7.30 E9/L    Immature Granulocytes # 0.06 E9/L    Lymphocytes # 1.70 1.50 - 4.00 E9/L    Monocytes # 0.80 0.10 - 0.95 E9/L    Eosinophils # 0.23 0.05 - 0.50 E9/L    Basophils # 0.05 0.00 - 0.20 B2/F   Basic Metabolic Panel   Result Value Ref Range    Sodium 136 132 - 146 mmol/L    Potassium 5.2 (H) 3.5 - 5.0 mmol/L    Chloride 101 98 - 107 mmol/L    CO2 25 22 - 29 mmol/L    Anion Gap 10 7 - 16 mmol/L    Glucose 112 (H) 74 - 99 mg/dL    BUN 39 (H) 8 - 23 mg/dL    CREATININE 1.4 (H) 0.5 - 1.0 mg/dL    GFR Non-African American 35 >=60 mL/min/1.73    GFR African American 43     Calcium 9.5 8.6 - 10.2 mg/dL   Protime-INR   Result Value Ref Range    Protime 24.8 (H) 9.3 - 12.4 sec    INR 2.2        Radiology:  XR HAND RIGHT (MIN 3 VIEWS)   Final Result      No fracture or dislocation. XR FEMUR RIGHT (MIN 2 VIEWS)   Final Result   No fracture or dislocation. XR HIP 2-3 VW W PELVIS RIGHT   Final Result   No fracture or dislocation. CT Head WO Contrast   Final Result   Age-appropriate diffuse atrophy with microangiopathic changes. No intracranial hemorrhage or signs of ischemia which may go   undetected in the hyperacute phase.             ------------------------- NURSING NOTES AND VITALS REVIEWED ---------------------------  Date / Time Roomed:  6/23/2019  4:09 PM  ED Bed Assignment:  19/19    The nursing notes within the ED encounter and vital signs as below have been reviewed. BP (!) 189/72   Pulse 70   Temp 98.2 °F (36.8 °C) (Oral)   Resp 14   Ht 5' (1.524 m)   Wt 130 lb (59 kg)   SpO2 95%   BMI 25.39 kg/m²   Oxygen Saturation Interpretation: Normal      ------------------------------------------ PROGRESS NOTES ------------------------------------------         10:16 PM  I have spoken with the patient and discussed todays results, in addition to providing specific details for the plan of care and counseling regarding the diagnosis and prognosis. Their questions are answered at this time and they are agreeable with the plan. I discussed at length with them reasons for immediate return here for re evaluation.  They will followup with

## 2019-06-25 ENCOUNTER — HOSPITAL ENCOUNTER (OUTPATIENT)
Age: 84
Discharge: HOME | End: 2019-06-25
Payer: MEDICARE

## 2019-06-25 DIAGNOSIS — N18.9 CHRONIC KIDNEY DISEASE, UNSPECIFIED CKD STAGE: ICD-10-CM

## 2019-06-25 LAB
ANION GAP SERPL CALCULATED.3IONS-SCNC: 10 MMOL/L (ref 7–16)
BUN BLDV-MCNC: 33 MG/DL (ref 8–23)
CALCIUM SERPL-MCNC: 9.6 MG/DL (ref 8.6–10.2)
CHLORIDE BLD-SCNC: 105 MMOL/L (ref 98–107)
CO2: 24 MMOL/L (ref 22–29)
CREAT SERPL-MCNC: 1.3 MG/DL (ref 0.5–1)
GFR AFRICAN AMERICAN: 46
GFR NON-AFRICAN AMERICAN: 38 ML/MIN/1.73
GLUCOSE BLD-MCNC: 138 MG/DL (ref 74–99)
POTASSIUM SERPL-SCNC: 4.9 MMOL/L (ref 3.5–5)
SODIUM BLD-SCNC: 139 MMOL/L (ref 132–146)

## 2019-06-25 PROCEDURE — 36415 COLL VENOUS BLD VENIPUNCTURE: CPT

## 2019-06-25 PROCEDURE — 80048 BASIC METABOLIC PNL TOTAL CA: CPT

## 2019-12-15 ENCOUNTER — APPOINTMENT (OUTPATIENT)
Dept: CT IMAGING | Age: 84
DRG: 689 | End: 2019-12-15
Payer: MEDICARE

## 2019-12-15 ENCOUNTER — APPOINTMENT (OUTPATIENT)
Dept: GENERAL RADIOLOGY | Age: 84
DRG: 689 | End: 2019-12-15
Payer: MEDICARE

## 2019-12-15 ENCOUNTER — HOSPITAL ENCOUNTER (INPATIENT)
Age: 84
LOS: 3 days | Discharge: NURSING, REHAB OR LONG-TERM CARE | DRG: 689 | End: 2019-12-20
Attending: EMERGENCY MEDICINE | Admitting: STUDENT IN AN ORGANIZED HEALTH CARE EDUCATION/TRAINING PROGRAM
Payer: MEDICARE

## 2019-12-15 DIAGNOSIS — R41.82 ALTERED MENTAL STATUS, UNSPECIFIED ALTERED MENTAL STATUS TYPE: ICD-10-CM

## 2019-12-15 DIAGNOSIS — N39.0 URINARY TRACT INFECTION WITHOUT HEMATURIA, SITE UNSPECIFIED: Primary | ICD-10-CM

## 2019-12-15 LAB
ALBUMIN SERPL-MCNC: 3.3 G/DL (ref 3.5–5.2)
ALP BLD-CCNC: 147 U/L (ref 35–104)
ALT SERPL-CCNC: 31 U/L (ref 0–32)
ANION GAP SERPL CALCULATED.3IONS-SCNC: 10 MMOL/L (ref 7–16)
AST SERPL-CCNC: 57 U/L (ref 0–31)
BACTERIA: ABNORMAL /HPF
BASOPHILS ABSOLUTE: 0.03 E9/L (ref 0–0.2)
BASOPHILS RELATIVE PERCENT: 0.4 % (ref 0–2)
BILIRUB SERPL-MCNC: 0.6 MG/DL (ref 0–1.2)
BILIRUBIN URINE: NEGATIVE
BLOOD, URINE: ABNORMAL
BUN BLDV-MCNC: 33 MG/DL (ref 8–23)
C-REACTIVE PROTEIN: 0.7 MG/DL (ref 0–0.4)
CALCIUM SERPL-MCNC: 8.9 MG/DL (ref 8.6–10.2)
CHLORIDE BLD-SCNC: 103 MMOL/L (ref 98–107)
CLARITY: ABNORMAL
CO2: 24 MMOL/L (ref 22–29)
COLOR: YELLOW
CREAT SERPL-MCNC: 1.2 MG/DL (ref 0.5–1)
EOSINOPHILS ABSOLUTE: 0.02 E9/L (ref 0.05–0.5)
EOSINOPHILS RELATIVE PERCENT: 0.3 % (ref 0–6)
GFR AFRICAN AMERICAN: 51
GFR NON-AFRICAN AMERICAN: 42 ML/MIN/1.73
GLUCOSE BLD-MCNC: 95 MG/DL (ref 74–99)
GLUCOSE URINE: NEGATIVE MG/DL
HCT VFR BLD CALC: 36.7 % (ref 34–48)
HEMOGLOBIN: 11.4 G/DL (ref 11.5–15.5)
IMMATURE GRANULOCYTES #: 0.02 E9/L
IMMATURE GRANULOCYTES %: 0.3 % (ref 0–5)
INR BLD: 1.5
KETONES, URINE: NEGATIVE MG/DL
LACTIC ACID, SEPSIS: 0.9 MMOL/L (ref 0.5–1.9)
LEUKOCYTE ESTERASE, URINE: ABNORMAL
LYMPHOCYTES ABSOLUTE: 1.61 E9/L (ref 1.5–4)
LYMPHOCYTES RELATIVE PERCENT: 22.1 % (ref 20–42)
MCH RBC QN AUTO: 29.9 PG (ref 26–35)
MCHC RBC AUTO-ENTMCNC: 31.1 % (ref 32–34.5)
MCV RBC AUTO: 96.3 FL (ref 80–99.9)
MONOCYTES ABSOLUTE: 0.8 E9/L (ref 0.1–0.95)
MONOCYTES RELATIVE PERCENT: 11 % (ref 2–12)
NEUTROPHILS ABSOLUTE: 4.79 E9/L (ref 1.8–7.3)
NEUTROPHILS RELATIVE PERCENT: 65.9 % (ref 43–80)
NITRITE, URINE: NEGATIVE
PDW BLD-RTO: 14.7 FL (ref 11.5–15)
PH UA: 6 (ref 5–9)
PLATELET # BLD: 206 E9/L (ref 130–450)
PMV BLD AUTO: 10.7 FL (ref 7–12)
POTASSIUM SERPL-SCNC: 4.1 MMOL/L (ref 3.5–5)
PROCALCITONIN: 0.09 NG/ML (ref 0–0.08)
PROTEIN UA: NEGATIVE MG/DL
PROTHROMBIN TIME: 18.2 SEC (ref 9.3–12.4)
RBC # BLD: 3.81 E12/L (ref 3.5–5.5)
RBC UA: ABNORMAL /HPF (ref 0–2)
SEDIMENTATION RATE, ERYTHROCYTE: 12 MM/HR (ref 0–20)
SODIUM BLD-SCNC: 137 MMOL/L (ref 132–146)
SPECIFIC GRAVITY UA: 1.01 (ref 1–1.03)
TOTAL PROTEIN: 6.6 G/DL (ref 6.4–8.3)
TROPONIN: 0.02 NG/ML (ref 0–0.03)
UROBILINOGEN, URINE: 0.2 E.U./DL
WBC # BLD: 7.3 E9/L (ref 4.5–11.5)
WBC UA: ABNORMAL /HPF (ref 0–5)
YEAST: ABNORMAL

## 2019-12-15 PROCEDURE — 87450 HC DIRECT STREP B ANTIGEN: CPT

## 2019-12-15 PROCEDURE — 87088 URINE BACTERIA CULTURE: CPT

## 2019-12-15 PROCEDURE — 2580000003 HC RX 258: Performed by: STUDENT IN AN ORGANIZED HEALTH CARE EDUCATION/TRAINING PROGRAM

## 2019-12-15 PROCEDURE — 85610 PROTHROMBIN TIME: CPT

## 2019-12-15 PROCEDURE — 36415 COLL VENOUS BLD VENIPUNCTURE: CPT

## 2019-12-15 PROCEDURE — 99220 PR INITIAL OBSERVATION CARE/DAY 70 MINUTES: CPT | Performed by: INTERNAL MEDICINE

## 2019-12-15 PROCEDURE — 96374 THER/PROPH/DIAG INJ IV PUSH: CPT

## 2019-12-15 PROCEDURE — 85651 RBC SED RATE NONAUTOMATED: CPT

## 2019-12-15 PROCEDURE — 6370000000 HC RX 637 (ALT 250 FOR IP): Performed by: STUDENT IN AN ORGANIZED HEALTH CARE EDUCATION/TRAINING PROGRAM

## 2019-12-15 PROCEDURE — 96375 TX/PRO/DX INJ NEW DRUG ADDON: CPT

## 2019-12-15 PROCEDURE — 86140 C-REACTIVE PROTEIN: CPT

## 2019-12-15 PROCEDURE — 84484 ASSAY OF TROPONIN QUANT: CPT

## 2019-12-15 PROCEDURE — G0378 HOSPITAL OBSERVATION PER HR: HCPCS

## 2019-12-15 PROCEDURE — 83605 ASSAY OF LACTIC ACID: CPT

## 2019-12-15 PROCEDURE — 74176 CT ABD & PELVIS W/O CONTRAST: CPT

## 2019-12-15 PROCEDURE — 6360000002 HC RX W HCPCS: Performed by: STUDENT IN AN ORGANIZED HEALTH CARE EDUCATION/TRAINING PROGRAM

## 2019-12-15 PROCEDURE — 87070 CULTURE OTHR SPECIMN AEROBIC: CPT

## 2019-12-15 PROCEDURE — 80053 COMPREHEN METABOLIC PANEL: CPT

## 2019-12-15 PROCEDURE — 85025 COMPLETE CBC W/AUTO DIFF WBC: CPT

## 2019-12-15 PROCEDURE — 71045 X-RAY EXAM CHEST 1 VIEW: CPT

## 2019-12-15 PROCEDURE — C9113 INJ PANTOPRAZOLE SODIUM, VIA: HCPCS | Performed by: STUDENT IN AN ORGANIZED HEALTH CARE EDUCATION/TRAINING PROGRAM

## 2019-12-15 PROCEDURE — 81001 URINALYSIS AUTO W/SCOPE: CPT

## 2019-12-15 PROCEDURE — 87186 SC STD MICRODIL/AGAR DIL: CPT

## 2019-12-15 PROCEDURE — 84145 PROCALCITONIN (PCT): CPT

## 2019-12-15 PROCEDURE — 99285 EMERGENCY DEPT VISIT HI MDM: CPT

## 2019-12-15 PROCEDURE — 96366 THER/PROPH/DIAG IV INF ADDON: CPT

## 2019-12-15 PROCEDURE — 87040 BLOOD CULTURE FOR BACTERIA: CPT

## 2019-12-15 PROCEDURE — 96365 THER/PROPH/DIAG IV INF INIT: CPT

## 2019-12-15 PROCEDURE — 94761 N-INVAS EAR/PLS OXIMETRY MLT: CPT

## 2019-12-15 PROCEDURE — 87077 CULTURE AEROBIC IDENTIFY: CPT

## 2019-12-15 RX ORDER — 0.9 % SODIUM CHLORIDE 0.9 %
500 INTRAVENOUS SOLUTION INTRAVENOUS ONCE
Status: COMPLETED | OUTPATIENT
Start: 2019-12-15 | End: 2019-12-15

## 2019-12-15 RX ORDER — ISOSORBIDE MONONITRATE 60 MG/1
60 TABLET, EXTENDED RELEASE ORAL DAILY
Status: DISCONTINUED | OUTPATIENT
Start: 2019-12-15 | End: 2019-12-20 | Stop reason: HOSPADM

## 2019-12-15 RX ORDER — WARFARIN SODIUM 5 MG/1
5 TABLET ORAL DAILY
Status: DISCONTINUED | OUTPATIENT
Start: 2019-12-15 | End: 2019-12-18

## 2019-12-15 RX ORDER — SODIUM CHLORIDE 0.9 % (FLUSH) 0.9 %
10 SYRINGE (ML) INJECTION PRN
Status: DISCONTINUED | OUTPATIENT
Start: 2019-12-15 | End: 2019-12-20 | Stop reason: HOSPADM

## 2019-12-15 RX ORDER — FENTANYL CITRATE 50 UG/ML
INJECTION, SOLUTION INTRAMUSCULAR; INTRAVENOUS
Status: DISPENSED
Start: 2019-12-15 | End: 2019-12-16

## 2019-12-15 RX ORDER — PANTOPRAZOLE SODIUM 40 MG/10ML
40 INJECTION, POWDER, LYOPHILIZED, FOR SOLUTION INTRAVENOUS DAILY
Status: DISCONTINUED | OUTPATIENT
Start: 2019-12-15 | End: 2019-12-20 | Stop reason: HOSPADM

## 2019-12-15 RX ORDER — PRAVASTATIN SODIUM 20 MG
40 TABLET ORAL NIGHTLY
Status: DISCONTINUED | OUTPATIENT
Start: 2019-12-15 | End: 2019-12-20 | Stop reason: HOSPADM

## 2019-12-15 RX ORDER — SODIUM CHLORIDE, SODIUM LACTATE, POTASSIUM CHLORIDE, CALCIUM CHLORIDE 600; 310; 30; 20 MG/100ML; MG/100ML; MG/100ML; MG/100ML
1000 INJECTION, SOLUTION INTRAVENOUS CONTINUOUS
Status: DISCONTINUED | OUTPATIENT
Start: 2019-12-15 | End: 2019-12-18

## 2019-12-15 RX ORDER — FENTANYL CITRATE 50 UG/ML
25 INJECTION, SOLUTION INTRAMUSCULAR; INTRAVENOUS ONCE
Status: COMPLETED | OUTPATIENT
Start: 2019-12-15 | End: 2019-12-15

## 2019-12-15 RX ORDER — LEVOTHYROXINE SODIUM 0.03 MG/1
25 TABLET ORAL DAILY
Status: DISCONTINUED | OUTPATIENT
Start: 2019-12-15 | End: 2019-12-20 | Stop reason: HOSPADM

## 2019-12-15 RX ORDER — ONDANSETRON 2 MG/ML
4 INJECTION INTRAMUSCULAR; INTRAVENOUS EVERY 6 HOURS PRN
Status: DISCONTINUED | OUTPATIENT
Start: 2019-12-15 | End: 2019-12-20 | Stop reason: HOSPADM

## 2019-12-15 RX ORDER — SODIUM CHLORIDE 0.9 % (FLUSH) 0.9 %
10 SYRINGE (ML) INJECTION EVERY 12 HOURS SCHEDULED
Status: DISCONTINUED | OUTPATIENT
Start: 2019-12-15 | End: 2019-12-20 | Stop reason: HOSPADM

## 2019-12-15 RX ORDER — ACETAMINOPHEN 325 MG/1
650 TABLET ORAL EVERY 4 HOURS PRN
Status: DISCONTINUED | OUTPATIENT
Start: 2019-12-15 | End: 2019-12-20 | Stop reason: HOSPADM

## 2019-12-15 RX ADMIN — PRAVASTATIN SODIUM 40 MG: 20 TABLET ORAL at 20:40

## 2019-12-15 RX ADMIN — CEFTRIAXONE 1 G: 1 INJECTION, POWDER, FOR SOLUTION INTRAMUSCULAR; INTRAVENOUS at 20:40

## 2019-12-15 RX ADMIN — METOPROLOL TARTRATE 25 MG: 25 TABLET ORAL at 20:40

## 2019-12-15 RX ADMIN — PANTOPRAZOLE SODIUM 40 MG: 40 INJECTION, POWDER, FOR SOLUTION INTRAVENOUS at 18:39

## 2019-12-15 RX ADMIN — CEFEPIME 2 G: 2 INJECTION, POWDER, FOR SOLUTION INTRAVENOUS at 16:20

## 2019-12-15 RX ADMIN — SODIUM CHLORIDE 500 ML: 9 INJECTION, SOLUTION INTRAVENOUS at 12:39

## 2019-12-15 RX ADMIN — SODIUM CHLORIDE, PRESERVATIVE FREE 10 ML: 5 INJECTION INTRAVENOUS at 20:40

## 2019-12-15 RX ADMIN — WARFARIN SODIUM 5 MG: 5 TABLET ORAL at 20:40

## 2019-12-15 RX ADMIN — FENTANYL CITRATE 25 MCG: 50 INJECTION, SOLUTION INTRAMUSCULAR; INTRAVENOUS at 14:02

## 2019-12-15 RX ADMIN — SODIUM CHLORIDE, POTASSIUM CHLORIDE, SODIUM LACTATE AND CALCIUM CHLORIDE 1000 ML: 600; 310; 30; 20 INJECTION, SOLUTION INTRAVENOUS at 18:39

## 2019-12-15 ASSESSMENT — PAIN SCALES - GENERAL
PAINLEVEL_OUTOF10: 0
PAINLEVEL_OUTOF10: 0

## 2019-12-16 ENCOUNTER — APPOINTMENT (OUTPATIENT)
Dept: GENERAL RADIOLOGY | Age: 84
DRG: 689 | End: 2019-12-16
Payer: MEDICARE

## 2019-12-16 LAB
ANION GAP SERPL CALCULATED.3IONS-SCNC: 12 MMOL/L (ref 7–16)
BASOPHILS ABSOLUTE: 0.03 E9/L (ref 0–0.2)
BASOPHILS RELATIVE PERCENT: 0.4 % (ref 0–2)
BUN BLDV-MCNC: 29 MG/DL (ref 8–23)
CALCIUM SERPL-MCNC: 8.2 MG/DL (ref 8.6–10.2)
CHLORIDE BLD-SCNC: 104 MMOL/L (ref 98–107)
CO2: 23 MMOL/L (ref 22–29)
CREAT SERPL-MCNC: 1.2 MG/DL (ref 0.5–1)
EOSINOPHILS ABSOLUTE: 0.01 E9/L (ref 0.05–0.5)
EOSINOPHILS RELATIVE PERCENT: 0.1 % (ref 0–6)
GFR AFRICAN AMERICAN: 51
GFR NON-AFRICAN AMERICAN: 42 ML/MIN/1.73
GLUCOSE BLD-MCNC: 95 MG/DL (ref 74–99)
HCT VFR BLD CALC: 32.8 % (ref 34–48)
HEMOGLOBIN: 10.5 G/DL (ref 11.5–15.5)
IMMATURE GRANULOCYTES #: 0.04 E9/L
IMMATURE GRANULOCYTES %: 0.5 % (ref 0–5)
L. PNEUMOPHILA SEROGP 1 UR AG: NORMAL
LYMPHOCYTES ABSOLUTE: 1.72 E9/L (ref 1.5–4)
LYMPHOCYTES RELATIVE PERCENT: 20.7 % (ref 20–42)
MCH RBC QN AUTO: 30.1 PG (ref 26–35)
MCHC RBC AUTO-ENTMCNC: 32 % (ref 32–34.5)
MCV RBC AUTO: 94 FL (ref 80–99.9)
MONOCYTES ABSOLUTE: 0.88 E9/L (ref 0.1–0.95)
MONOCYTES RELATIVE PERCENT: 10.6 % (ref 2–12)
NEUTROPHILS ABSOLUTE: 5.61 E9/L (ref 1.8–7.3)
NEUTROPHILS RELATIVE PERCENT: 67.7 % (ref 43–80)
PDW BLD-RTO: 14.6 FL (ref 11.5–15)
PLATELET # BLD: 170 E9/L (ref 130–450)
PMV BLD AUTO: 10.8 FL (ref 7–12)
POTASSIUM REFLEX MAGNESIUM: 4.2 MMOL/L (ref 3.5–5)
RBC # BLD: 3.49 E12/L (ref 3.5–5.5)
SODIUM BLD-SCNC: 139 MMOL/L (ref 132–146)
STREP PNEUMONIAE ANTIGEN, URINE: NORMAL
T4 TOTAL: 3.9 MCG/DL (ref 4.5–11.7)
WBC # BLD: 8.3 E9/L (ref 4.5–11.5)

## 2019-12-16 PROCEDURE — 71046 X-RAY EXAM CHEST 2 VIEWS: CPT

## 2019-12-16 PROCEDURE — 96366 THER/PROPH/DIAG IV INF ADDON: CPT

## 2019-12-16 PROCEDURE — 80048 BASIC METABOLIC PNL TOTAL CA: CPT

## 2019-12-16 PROCEDURE — 6360000002 HC RX W HCPCS: Performed by: STUDENT IN AN ORGANIZED HEALTH CARE EDUCATION/TRAINING PROGRAM

## 2019-12-16 PROCEDURE — G0378 HOSPITAL OBSERVATION PER HR: HCPCS

## 2019-12-16 PROCEDURE — C9113 INJ PANTOPRAZOLE SODIUM, VIA: HCPCS | Performed by: STUDENT IN AN ORGANIZED HEALTH CARE EDUCATION/TRAINING PROGRAM

## 2019-12-16 PROCEDURE — 99233 SBSQ HOSP IP/OBS HIGH 50: CPT | Performed by: INTERNAL MEDICINE

## 2019-12-16 PROCEDURE — 85025 COMPLETE CBC W/AUTO DIFF WBC: CPT

## 2019-12-16 PROCEDURE — 2580000003 HC RX 258: Performed by: STUDENT IN AN ORGANIZED HEALTH CARE EDUCATION/TRAINING PROGRAM

## 2019-12-16 PROCEDURE — 6360000002 HC RX W HCPCS: Performed by: INTERNAL MEDICINE

## 2019-12-16 PROCEDURE — 94664 DEMO&/EVAL PT USE INHALER: CPT

## 2019-12-16 PROCEDURE — 84436 ASSAY OF TOTAL THYROXINE: CPT

## 2019-12-16 PROCEDURE — 6370000000 HC RX 637 (ALT 250 FOR IP): Performed by: STUDENT IN AN ORGANIZED HEALTH CARE EDUCATION/TRAINING PROGRAM

## 2019-12-16 PROCEDURE — 36415 COLL VENOUS BLD VENIPUNCTURE: CPT

## 2019-12-16 PROCEDURE — 96376 TX/PRO/DX INJ SAME DRUG ADON: CPT

## 2019-12-16 PROCEDURE — 6370000000 HC RX 637 (ALT 250 FOR IP): Performed by: INTERNAL MEDICINE

## 2019-12-16 RX ORDER — MORPHINE SULFATE 100 MG/5ML
10 SOLUTION ORAL EVERY 4 HOURS PRN
Status: DISCONTINUED | OUTPATIENT
Start: 2019-12-16 | End: 2019-12-16

## 2019-12-16 RX ORDER — HYDROCODONE BITARTRATE AND ACETAMINOPHEN 10; 325 MG/1; MG/1
1 TABLET ORAL EVERY 12 HOURS PRN
COMMUNITY
End: 2022-11-02 | Stop reason: ALTCHOICE

## 2019-12-16 RX ORDER — MORPHINE SULFATE 15 MG/1
15 TABLET, FILM COATED, EXTENDED RELEASE ORAL 2 TIMES DAILY
Status: DISCONTINUED | OUTPATIENT
Start: 2019-12-16 | End: 2019-12-20 | Stop reason: HOSPADM

## 2019-12-16 RX ORDER — LISINOPRIL 5 MG/1
5 TABLET ORAL DAILY
COMMUNITY
End: 2022-11-02 | Stop reason: ALTCHOICE

## 2019-12-16 RX ORDER — OMEPRAZOLE 20 MG/1
20 CAPSULE, DELAYED RELEASE ORAL DAILY
Status: ON HOLD | COMMUNITY

## 2019-12-16 RX ORDER — MORPHINE SULFATE 100 MG/5ML
10 SOLUTION ORAL DAILY PRN
Status: ON HOLD | COMMUNITY
End: 2019-12-16 | Stop reason: CLARIF

## 2019-12-16 RX ORDER — FLUCONAZOLE 100 MG/1
200 TABLET ORAL DAILY
Status: DISCONTINUED | OUTPATIENT
Start: 2019-12-16 | End: 2019-12-18

## 2019-12-16 RX ORDER — MORPHINE SULFATE 15 MG/1
15 TABLET ORAL 2 TIMES DAILY
COMMUNITY
End: 2022-11-02 | Stop reason: ALTCHOICE

## 2019-12-16 RX ORDER — FUROSEMIDE 20 MG/1
20 TABLET ORAL
COMMUNITY
End: 2022-11-02 | Stop reason: ALTCHOICE

## 2019-12-16 RX ORDER — MULTIVITAMIN WITH IRON
250 TABLET ORAL NIGHTLY
COMMUNITY
End: 2022-11-02 | Stop reason: ALTCHOICE

## 2019-12-16 RX ORDER — HYDROCODONE BITARTRATE AND ACETAMINOPHEN 10; 325 MG/1; MG/1
1 TABLET ORAL EVERY 12 HOURS PRN
Status: DISCONTINUED | OUTPATIENT
Start: 2019-12-16 | End: 2019-12-17

## 2019-12-16 RX ORDER — ALBUTEROL SULFATE 0.63 MG/3ML
0.63 SOLUTION RESPIRATORY (INHALATION) EVERY 6 HOURS PRN
Status: DISCONTINUED | OUTPATIENT
Start: 2019-12-16 | End: 2019-12-18

## 2019-12-16 RX ADMIN — ALBUTEROL SULFATE 0.63 MG: 0.63 SOLUTION RESPIRATORY (INHALATION) at 18:22

## 2019-12-16 RX ADMIN — CEFTRIAXONE 1 G: 1 INJECTION, POWDER, FOR SOLUTION INTRAMUSCULAR; INTRAVENOUS at 21:16

## 2019-12-16 RX ADMIN — METOPROLOL TARTRATE 25 MG: 25 TABLET ORAL at 09:38

## 2019-12-16 RX ADMIN — MORPHINE SULFATE 15 MG: 15 TABLET, FILM COATED, EXTENDED RELEASE ORAL at 21:17

## 2019-12-16 RX ADMIN — ACETAMINOPHEN 650 MG: 325 TABLET ORAL at 13:15

## 2019-12-16 RX ADMIN — FLUCONAZOLE 200 MG: 100 TABLET ORAL at 13:15

## 2019-12-16 RX ADMIN — PRAVASTATIN SODIUM 40 MG: 20 TABLET ORAL at 21:17

## 2019-12-16 RX ADMIN — WARFARIN SODIUM 5 MG: 5 TABLET ORAL at 18:01

## 2019-12-16 RX ADMIN — NYSTATIN 500000 UNITS: 100000 SUSPENSION ORAL at 18:01

## 2019-12-16 RX ADMIN — PANTOPRAZOLE SODIUM 40 MG: 40 INJECTION, POWDER, FOR SOLUTION INTRAVENOUS at 09:38

## 2019-12-16 RX ADMIN — NYSTATIN 500000 UNITS: 100000 SUSPENSION ORAL at 13:16

## 2019-12-16 RX ADMIN — METOPROLOL TARTRATE 25 MG: 25 TABLET ORAL at 21:17

## 2019-12-16 RX ADMIN — LEVOTHYROXINE SODIUM 25 MCG: 25 TABLET ORAL at 09:38

## 2019-12-16 RX ADMIN — NYSTATIN 500000 UNITS: 100000 SUSPENSION ORAL at 21:17

## 2019-12-16 RX ADMIN — ISOSORBIDE MONONITRATE 60 MG: 60 TABLET, EXTENDED RELEASE ORAL at 09:38

## 2019-12-16 RX ADMIN — SODIUM CHLORIDE, PRESERVATIVE FREE 10 ML: 5 INJECTION INTRAVENOUS at 09:38

## 2019-12-16 ASSESSMENT — PAIN SCALES - GENERAL
PAINLEVEL_OUTOF10: 4
PAINLEVEL_OUTOF10: 0
PAINLEVEL_OUTOF10: 5
PAINLEVEL_OUTOF10: 0

## 2019-12-17 LAB
ADENOVIRUS BY PCR: NOT DETECTED
ANION GAP SERPL CALCULATED.3IONS-SCNC: 15 MMOL/L (ref 7–16)
BASOPHILS ABSOLUTE: 0.05 E9/L (ref 0–0.2)
BASOPHILS RELATIVE PERCENT: 0.2 % (ref 0–2)
BORDETELLA PARAPERTUSSIS BY PCR: NOT DETECTED
BORDETELLA PERTUSSIS BY PCR: NOT DETECTED
BUN BLDV-MCNC: 24 MG/DL (ref 8–23)
CALCIUM SERPL-MCNC: 9 MG/DL (ref 8.6–10.2)
CHLAMYDOPHILIA PNEUMONIAE BY PCR: NOT DETECTED
CHLORIDE BLD-SCNC: 101 MMOL/L (ref 98–107)
CO2: 21 MMOL/L (ref 22–29)
CORONAVIRUS 229E BY PCR: NOT DETECTED
CORONAVIRUS HKU1 BY PCR: NOT DETECTED
CORONAVIRUS NL63 BY PCR: NOT DETECTED
CORONAVIRUS OC43 BY PCR: NOT DETECTED
CREAT SERPL-MCNC: 1.1 MG/DL (ref 0.5–1)
EOSINOPHILS ABSOLUTE: 0.02 E9/L (ref 0.05–0.5)
EOSINOPHILS RELATIVE PERCENT: 0.1 % (ref 0–6)
GFR AFRICAN AMERICAN: 56
GFR NON-AFRICAN AMERICAN: 47 ML/MIN/1.73
GLUCOSE BLD-MCNC: 140 MG/DL (ref 74–99)
HCT VFR BLD CALC: 43.5 % (ref 34–48)
HEMOGLOBIN: 14 G/DL (ref 11.5–15.5)
HUMAN METAPNEUMOVIRUS BY PCR: NOT DETECTED
HUMAN RHINOVIRUS/ENTEROVIRUS BY PCR: NOT DETECTED
IMMATURE GRANULOCYTES #: 0.25 E9/L
IMMATURE GRANULOCYTES %: 1.1 % (ref 0–5)
INFLUENZA A BY PCR: NOT DETECTED
INFLUENZA B BY PCR: NOT DETECTED
LYMPHOCYTES ABSOLUTE: 3.28 E9/L (ref 1.5–4)
LYMPHOCYTES RELATIVE PERCENT: 14.6 % (ref 20–42)
MAGNESIUM: 1.8 MG/DL (ref 1.6–2.6)
MCH RBC QN AUTO: 30.2 PG (ref 26–35)
MCHC RBC AUTO-ENTMCNC: 32.2 % (ref 32–34.5)
MCV RBC AUTO: 94 FL (ref 80–99.9)
MONOCYTES ABSOLUTE: 1.29 E9/L (ref 0.1–0.95)
MONOCYTES RELATIVE PERCENT: 5.7 % (ref 2–12)
MYCOPLASMA PNEUMONIAE BY PCR: NOT DETECTED
NEUTROPHILS ABSOLUTE: 17.63 E9/L (ref 1.8–7.3)
NEUTROPHILS RELATIVE PERCENT: 78.3 % (ref 43–80)
PARAINFLUENZA VIRUS 1 BY PCR: NOT DETECTED
PARAINFLUENZA VIRUS 2 BY PCR: NOT DETECTED
PARAINFLUENZA VIRUS 3 BY PCR: NOT DETECTED
PARAINFLUENZA VIRUS 4 BY PCR: NOT DETECTED
PDW BLD-RTO: 14.8 FL (ref 11.5–15)
PLATELET # BLD: 247 E9/L (ref 130–450)
PMV BLD AUTO: 10.8 FL (ref 7–12)
POTASSIUM REFLEX MAGNESIUM: 3.5 MMOL/L (ref 3.5–5)
RBC # BLD: 4.63 E12/L (ref 3.5–5.5)
RESPIRATORY SYNCYTIAL VIRUS BY PCR: DETECTED
SODIUM BLD-SCNC: 137 MMOL/L (ref 132–146)
WBC # BLD: 22.5 E9/L (ref 4.5–11.5)

## 2019-12-17 PROCEDURE — 6370000000 HC RX 637 (ALT 250 FOR IP): Performed by: INTERNAL MEDICINE

## 2019-12-17 PROCEDURE — 83735 ASSAY OF MAGNESIUM: CPT

## 2019-12-17 PROCEDURE — 0100U HC RESPIRPTHGN MULT REV TRANS & AMP PRB TECH 21 TRGT: CPT

## 2019-12-17 PROCEDURE — 6360000002 HC RX W HCPCS: Performed by: STUDENT IN AN ORGANIZED HEALTH CARE EDUCATION/TRAINING PROGRAM

## 2019-12-17 PROCEDURE — C9113 INJ PANTOPRAZOLE SODIUM, VIA: HCPCS | Performed by: STUDENT IN AN ORGANIZED HEALTH CARE EDUCATION/TRAINING PROGRAM

## 2019-12-17 PROCEDURE — 99223 1ST HOSP IP/OBS HIGH 75: CPT | Performed by: PHYSICIAN ASSISTANT

## 2019-12-17 PROCEDURE — 2500000003 HC RX 250 WO HCPCS: Performed by: INTERNAL MEDICINE

## 2019-12-17 PROCEDURE — APPSS30 APP SPLIT SHARED TIME 16-30 MINUTES: Performed by: PHYSICIAN ASSISTANT

## 2019-12-17 PROCEDURE — 2580000003 HC RX 258: Performed by: STUDENT IN AN ORGANIZED HEALTH CARE EDUCATION/TRAINING PROGRAM

## 2019-12-17 PROCEDURE — 97161 PT EVAL LOW COMPLEX 20 MIN: CPT

## 2019-12-17 PROCEDURE — 80048 BASIC METABOLIC PNL TOTAL CA: CPT

## 2019-12-17 PROCEDURE — 85025 COMPLETE CBC W/AUTO DIFF WBC: CPT

## 2019-12-17 PROCEDURE — 36415 COLL VENOUS BLD VENIPUNCTURE: CPT

## 2019-12-17 PROCEDURE — 87206 SMEAR FLUORESCENT/ACID STAI: CPT

## 2019-12-17 PROCEDURE — 6370000000 HC RX 637 (ALT 250 FOR IP): Performed by: STUDENT IN AN ORGANIZED HEALTH CARE EDUCATION/TRAINING PROGRAM

## 2019-12-17 PROCEDURE — 1200000000 HC SEMI PRIVATE

## 2019-12-17 PROCEDURE — 6360000002 HC RX W HCPCS: Performed by: INTERNAL MEDICINE

## 2019-12-17 PROCEDURE — 96376 TX/PRO/DX INJ SAME DRUG ADON: CPT

## 2019-12-17 PROCEDURE — 2580000003 HC RX 258: Performed by: INTERNAL MEDICINE

## 2019-12-17 PROCEDURE — 87070 CULTURE OTHR SPECIMN AEROBIC: CPT

## 2019-12-17 PROCEDURE — 99232 SBSQ HOSP IP/OBS MODERATE 35: CPT | Performed by: INTERNAL MEDICINE

## 2019-12-17 PROCEDURE — 94640 AIRWAY INHALATION TREATMENT: CPT

## 2019-12-17 PROCEDURE — 97165 OT EVAL LOW COMPLEX 30 MIN: CPT

## 2019-12-17 RX ORDER — MORPHINE SULFATE 10 MG/5ML
5 SOLUTION ORAL EVERY 4 HOURS PRN
Status: DISCONTINUED | OUTPATIENT
Start: 2019-12-17 | End: 2019-12-20 | Stop reason: HOSPADM

## 2019-12-17 RX ORDER — MORPHINE SULFATE 10 MG/5ML
10 SOLUTION ORAL EVERY 4 HOURS PRN
Status: DISCONTINUED | OUTPATIENT
Start: 2019-12-17 | End: 2019-12-20 | Stop reason: HOSPADM

## 2019-12-17 RX ADMIN — NYSTATIN 500000 UNITS: 100000 SUSPENSION ORAL at 10:13

## 2019-12-17 RX ADMIN — MEROPENEM 500 MG: 500 INJECTION, POWDER, FOR SOLUTION INTRAVENOUS at 23:39

## 2019-12-17 RX ADMIN — PANTOPRAZOLE SODIUM 40 MG: 40 INJECTION, POWDER, FOR SOLUTION INTRAVENOUS at 10:12

## 2019-12-17 RX ADMIN — WARFARIN SODIUM 5 MG: 5 TABLET ORAL at 18:57

## 2019-12-17 RX ADMIN — MICONAZOLE NITRATE: 20 POWDER TOPICAL at 23:32

## 2019-12-17 RX ADMIN — ISOSORBIDE MONONITRATE 60 MG: 60 TABLET, EXTENDED RELEASE ORAL at 10:12

## 2019-12-17 RX ADMIN — MEROPENEM 500 MG: 500 INJECTION, POWDER, FOR SOLUTION INTRAVENOUS at 14:23

## 2019-12-17 RX ADMIN — NYSTATIN 500000 UNITS: 100000 SUSPENSION ORAL at 14:33

## 2019-12-17 RX ADMIN — SODIUM CHLORIDE, PRESERVATIVE FREE 10 ML: 5 INJECTION INTRAVENOUS at 14:25

## 2019-12-17 RX ADMIN — NYSTATIN 500000 UNITS: 100000 SUSPENSION ORAL at 23:31

## 2019-12-17 RX ADMIN — MORPHINE SULFATE 15 MG: 15 TABLET, FILM COATED, EXTENDED RELEASE ORAL at 23:32

## 2019-12-17 RX ADMIN — MORPHINE SULFATE 15 MG: 15 TABLET, FILM COATED, EXTENDED RELEASE ORAL at 10:15

## 2019-12-17 RX ADMIN — PRAVASTATIN SODIUM 40 MG: 20 TABLET ORAL at 23:31

## 2019-12-17 RX ADMIN — METOPROLOL TARTRATE 25 MG: 25 TABLET ORAL at 10:13

## 2019-12-17 RX ADMIN — LEVOTHYROXINE SODIUM 25 MCG: 25 TABLET ORAL at 05:34

## 2019-12-17 RX ADMIN — ALBUTEROL SULFATE 0.63 MG: 0.63 SOLUTION RESPIRATORY (INHALATION) at 11:03

## 2019-12-17 RX ADMIN — FLUCONAZOLE 200 MG: 100 TABLET ORAL at 10:12

## 2019-12-17 RX ADMIN — METOPROLOL TARTRATE 25 MG: 25 TABLET ORAL at 23:32

## 2019-12-17 RX ADMIN — MICONAZOLE NITRATE: 20 POWDER TOPICAL at 10:12

## 2019-12-17 RX ADMIN — ALBUTEROL SULFATE 0.63 MG: 0.63 SOLUTION RESPIRATORY (INHALATION) at 01:21

## 2019-12-17 RX ADMIN — NYSTATIN 500000 UNITS: 100000 SUSPENSION ORAL at 18:57

## 2019-12-17 RX ADMIN — SODIUM CHLORIDE, POTASSIUM CHLORIDE, SODIUM LACTATE AND CALCIUM CHLORIDE 1000 ML: 600; 310; 30; 20 INJECTION, SOLUTION INTRAVENOUS at 00:58

## 2019-12-17 RX ADMIN — SODIUM CHLORIDE, PRESERVATIVE FREE 10 ML: 5 INJECTION INTRAVENOUS at 10:13

## 2019-12-17 ASSESSMENT — PAIN SCALES - GENERAL
PAINLEVEL_OUTOF10: 0

## 2019-12-18 ENCOUNTER — APPOINTMENT (OUTPATIENT)
Dept: CT IMAGING | Age: 84
DRG: 689 | End: 2019-12-18
Payer: MEDICARE

## 2019-12-18 PROBLEM — E44.0 MODERATE PROTEIN-CALORIE MALNUTRITION (HCC): Chronic | Status: ACTIVE | Noted: 2019-12-18

## 2019-12-18 LAB
ANION GAP SERPL CALCULATED.3IONS-SCNC: 9 MMOL/L (ref 7–16)
BASOPHILS ABSOLUTE: 0 E9/L (ref 0–0.2)
BASOPHILS RELATIVE PERCENT: 0 % (ref 0–2)
BUN BLDV-MCNC: 19 MG/DL (ref 8–23)
BURR CELLS: ABNORMAL
CALCIUM SERPL-MCNC: 7.9 MG/DL (ref 8.6–10.2)
CHLORIDE BLD-SCNC: 105 MMOL/L (ref 98–107)
CO2: 23 MMOL/L (ref 22–29)
CREAT SERPL-MCNC: 1 MG/DL (ref 0.5–1)
EOSINOPHILS ABSOLUTE: 0 E9/L (ref 0.05–0.5)
EOSINOPHILS RELATIVE PERCENT: 0 % (ref 0–6)
GFR AFRICAN AMERICAN: >60
GFR NON-AFRICAN AMERICAN: 52 ML/MIN/1.73
GLUCOSE BLD-MCNC: 104 MG/DL (ref 74–99)
HCT VFR BLD CALC: 31 % (ref 34–48)
HEMOGLOBIN: 9.7 G/DL (ref 11.5–15.5)
HYPOCHROMIA: ABNORMAL
INR BLD: 5
LYMPHOCYTES ABSOLUTE: 0.13 E9/L (ref 1.5–4)
LYMPHOCYTES RELATIVE PERCENT: 0.8 % (ref 20–42)
MCH RBC QN AUTO: 29.7 PG (ref 26–35)
MCHC RBC AUTO-ENTMCNC: 31.3 % (ref 32–34.5)
MCV RBC AUTO: 94.8 FL (ref 80–99.9)
MONOCYTES ABSOLUTE: 0.38 E9/L (ref 0.1–0.95)
MONOCYTES RELATIVE PERCENT: 2.6 % (ref 2–12)
NEUTROPHILS ABSOLUTE: 12.42 E9/L (ref 1.8–7.3)
NEUTROPHILS RELATIVE PERCENT: 96.6 % (ref 43–80)
NUCLEATED RED BLOOD CELLS: 0 /100 WBC
ORGANISM: ABNORMAL
ORGANISM: ABNORMAL
OVALOCYTES: ABNORMAL
PDW BLD-RTO: 14.8 FL (ref 11.5–15)
PLATELET # BLD: 153 E9/L (ref 130–450)
PMV BLD AUTO: 10.9 FL (ref 7–12)
POIKILOCYTES: ABNORMAL
POTASSIUM REFLEX MAGNESIUM: 3.7 MMOL/L (ref 3.5–5)
PROTHROMBIN TIME: 61.3 SEC (ref 9.3–12.4)
RBC # BLD: 3.27 E12/L (ref 3.5–5.5)
SODIUM BLD-SCNC: 137 MMOL/L (ref 132–146)
URINE CULTURE, ROUTINE: ABNORMAL
URINE CULTURE, ROUTINE: ABNORMAL
WBC # BLD: 12.8 E9/L (ref 4.5–11.5)

## 2019-12-18 PROCEDURE — 94640 AIRWAY INHALATION TREATMENT: CPT

## 2019-12-18 PROCEDURE — 2500000003 HC RX 250 WO HCPCS: Performed by: INTERNAL MEDICINE

## 2019-12-18 PROCEDURE — C9113 INJ PANTOPRAZOLE SODIUM, VIA: HCPCS | Performed by: STUDENT IN AN ORGANIZED HEALTH CARE EDUCATION/TRAINING PROGRAM

## 2019-12-18 PROCEDURE — 6360000002 HC RX W HCPCS: Performed by: INTERNAL MEDICINE

## 2019-12-18 PROCEDURE — 2700000000 HC OXYGEN THERAPY PER DAY

## 2019-12-18 PROCEDURE — 85025 COMPLETE CBC W/AUTO DIFF WBC: CPT

## 2019-12-18 PROCEDURE — 2580000003 HC RX 258: Performed by: STUDENT IN AN ORGANIZED HEALTH CARE EDUCATION/TRAINING PROGRAM

## 2019-12-18 PROCEDURE — 6370000000 HC RX 637 (ALT 250 FOR IP): Performed by: STUDENT IN AN ORGANIZED HEALTH CARE EDUCATION/TRAINING PROGRAM

## 2019-12-18 PROCEDURE — 6370000000 HC RX 637 (ALT 250 FOR IP): Performed by: INTERNAL MEDICINE

## 2019-12-18 PROCEDURE — 2580000003 HC RX 258

## 2019-12-18 PROCEDURE — 2580000003 HC RX 258: Performed by: INTERNAL MEDICINE

## 2019-12-18 PROCEDURE — 36415 COLL VENOUS BLD VENIPUNCTURE: CPT

## 2019-12-18 PROCEDURE — 85610 PROTHROMBIN TIME: CPT

## 2019-12-18 PROCEDURE — 80048 BASIC METABOLIC PNL TOTAL CA: CPT

## 2019-12-18 PROCEDURE — 1200000000 HC SEMI PRIVATE

## 2019-12-18 PROCEDURE — 97535 SELF CARE MNGMENT TRAINING: CPT

## 2019-12-18 PROCEDURE — 71250 CT THORAX DX C-: CPT

## 2019-12-18 PROCEDURE — 99233 SBSQ HOSP IP/OBS HIGH 50: CPT | Performed by: INTERNAL MEDICINE

## 2019-12-18 PROCEDURE — 6360000002 HC RX W HCPCS: Performed by: STUDENT IN AN ORGANIZED HEALTH CARE EDUCATION/TRAINING PROGRAM

## 2019-12-18 RX ORDER — LINEZOLID 600 MG/1
600 TABLET, FILM COATED ORAL EVERY 12 HOURS SCHEDULED
Status: DISCONTINUED | OUTPATIENT
Start: 2019-12-18 | End: 2019-12-20 | Stop reason: HOSPADM

## 2019-12-18 RX ORDER — FORMOTEROL FUMARATE 20 UG/2ML
20 SOLUTION RESPIRATORY (INHALATION) 2 TIMES DAILY
Status: DISCONTINUED | OUTPATIENT
Start: 2019-12-18 | End: 2019-12-20 | Stop reason: HOSPADM

## 2019-12-18 RX ORDER — IPRATROPIUM BROMIDE AND ALBUTEROL SULFATE 2.5; .5 MG/3ML; MG/3ML
1 SOLUTION RESPIRATORY (INHALATION) ONCE
Status: COMPLETED | OUTPATIENT
Start: 2019-12-18 | End: 2019-12-18

## 2019-12-18 RX ORDER — BUDESONIDE 0.5 MG/2ML
1000 INHALANT ORAL 2 TIMES DAILY
Status: DISCONTINUED | OUTPATIENT
Start: 2019-12-18 | End: 2019-12-20 | Stop reason: HOSPADM

## 2019-12-18 RX ORDER — METHYLPREDNISOLONE SODIUM SUCCINATE 125 MG/2ML
60 INJECTION, POWDER, LYOPHILIZED, FOR SOLUTION INTRAMUSCULAR; INTRAVENOUS EVERY 8 HOURS
Status: DISCONTINUED | OUTPATIENT
Start: 2019-12-18 | End: 2019-12-19

## 2019-12-18 RX ADMIN — IPRATROPIUM BROMIDE AND ALBUTEROL SULFATE 1 AMPULE: .5; 3 SOLUTION RESPIRATORY (INHALATION) at 15:41

## 2019-12-18 RX ADMIN — METHYLPREDNISOLONE SODIUM SUCCINATE 60 MG: 125 INJECTION, POWDER, LYOPHILIZED, FOR SOLUTION INTRAMUSCULAR; INTRAVENOUS at 22:41

## 2019-12-18 RX ADMIN — FORMOTEROL FUMARATE DIHYDRATE 20 MCG: 20 SOLUTION RESPIRATORY (INHALATION) at 20:16

## 2019-12-18 RX ADMIN — NYSTATIN 500000 UNITS: 100000 SUSPENSION ORAL at 09:11

## 2019-12-18 RX ADMIN — SODIUM CHLORIDE, PRESERVATIVE FREE 10 ML: 5 INJECTION INTRAVENOUS at 12:14

## 2019-12-18 RX ADMIN — BUDESONIDE 1000 MCG: 0.5 SUSPENSION RESPIRATORY (INHALATION) at 20:17

## 2019-12-18 RX ADMIN — APIXABAN 2.5 MG: 2.5 TABLET, FILM COATED ORAL at 22:40

## 2019-12-18 RX ADMIN — LEVOTHYROXINE SODIUM 25 MCG: 25 TABLET ORAL at 06:25

## 2019-12-18 RX ADMIN — SODIUM CHLORIDE, PRESERVATIVE FREE 10 ML: 5 INJECTION INTRAVENOUS at 17:13

## 2019-12-18 RX ADMIN — METHYLPREDNISOLONE SODIUM SUCCINATE 60 MG: 125 INJECTION, POWDER, LYOPHILIZED, FOR SOLUTION INTRAMUSCULAR; INTRAVENOUS at 15:16

## 2019-12-18 RX ADMIN — MORPHINE SULFATE 15 MG: 15 TABLET, FILM COATED, EXTENDED RELEASE ORAL at 09:10

## 2019-12-18 RX ADMIN — MEROPENEM 500 MG: 500 INJECTION, POWDER, FOR SOLUTION INTRAVENOUS at 12:14

## 2019-12-18 RX ADMIN — MORPHINE SULFATE 15 MG: 15 TABLET, FILM COATED, EXTENDED RELEASE ORAL at 22:39

## 2019-12-18 RX ADMIN — WATER 2 ML: 1 INJECTION INTRAMUSCULAR; INTRAVENOUS; SUBCUTANEOUS at 15:16

## 2019-12-18 RX ADMIN — LINEZOLID 600 MG: 600 TABLET, FILM COATED ORAL at 22:39

## 2019-12-18 RX ADMIN — PRAVASTATIN SODIUM 40 MG: 20 TABLET ORAL at 22:40

## 2019-12-18 RX ADMIN — SODIUM CHLORIDE, PRESERVATIVE FREE 10 ML: 5 INJECTION INTRAVENOUS at 15:16

## 2019-12-18 RX ADMIN — NYSTATIN 500000 UNITS: 100000 SUSPENSION ORAL at 22:39

## 2019-12-18 RX ADMIN — METOPROLOL TARTRATE 25 MG: 25 TABLET ORAL at 22:40

## 2019-12-18 RX ADMIN — WATER 10 ML: 1 INJECTION INTRAMUSCULAR; INTRAVENOUS; SUBCUTANEOUS at 22:41

## 2019-12-18 RX ADMIN — FLUCONAZOLE 200 MG: 100 TABLET ORAL at 09:16

## 2019-12-18 RX ADMIN — MICONAZOLE NITRATE: 20 POWDER TOPICAL at 09:16

## 2019-12-18 RX ADMIN — METOPROLOL TARTRATE 25 MG: 25 TABLET ORAL at 09:11

## 2019-12-18 RX ADMIN — NYSTATIN 500000 UNITS: 100000 SUSPENSION ORAL at 17:49

## 2019-12-18 RX ADMIN — SODIUM CHLORIDE, PRESERVATIVE FREE 10 ML: 5 INJECTION INTRAVENOUS at 22:40

## 2019-12-18 RX ADMIN — SODIUM CHLORIDE, PRESERVATIVE FREE 10 ML: 5 INJECTION INTRAVENOUS at 09:11

## 2019-12-18 RX ADMIN — NYSTATIN 500000 UNITS: 100000 SUSPENSION ORAL at 13:42

## 2019-12-18 RX ADMIN — PANTOPRAZOLE SODIUM 40 MG: 40 INJECTION, POWDER, FOR SOLUTION INTRAVENOUS at 09:10

## 2019-12-18 RX ADMIN — SODIUM CHLORIDE, PRESERVATIVE FREE 10 ML: 5 INJECTION INTRAVENOUS at 09:09

## 2019-12-18 RX ADMIN — ISOSORBIDE MONONITRATE 60 MG: 60 TABLET, EXTENDED RELEASE ORAL at 09:10

## 2019-12-18 ASSESSMENT — PAIN DESCRIPTION - ORIENTATION: ORIENTATION: RIGHT;LEFT;ANTERIOR

## 2019-12-18 ASSESSMENT — PAIN SCALES - GENERAL
PAINLEVEL_OUTOF10: 0
PAINLEVEL_OUTOF10: 8
PAINLEVEL_OUTOF10: 0

## 2019-12-18 ASSESSMENT — PAIN DESCRIPTION - LOCATION: LOCATION: ABDOMEN

## 2019-12-18 ASSESSMENT — PAIN DESCRIPTION - PROGRESSION: CLINICAL_PROGRESSION: GRADUALLY WORSENING

## 2019-12-18 ASSESSMENT — PAIN DESCRIPTION - FREQUENCY: FREQUENCY: INTERMITTENT

## 2019-12-18 ASSESSMENT — PAIN DESCRIPTION - PAIN TYPE: TYPE: ACUTE PAIN

## 2019-12-18 ASSESSMENT — PAIN - FUNCTIONAL ASSESSMENT: PAIN_FUNCTIONAL_ASSESSMENT: PREVENTS OR INTERFERES SOME ACTIVE ACTIVITIES AND ADLS

## 2019-12-18 ASSESSMENT — PAIN DESCRIPTION - DESCRIPTORS: DESCRIPTORS: ACHING;SORE

## 2019-12-18 ASSESSMENT — PAIN DESCRIPTION - ONSET: ONSET: ON-GOING

## 2019-12-19 LAB
ANION GAP SERPL CALCULATED.3IONS-SCNC: 10 MMOL/L (ref 7–16)
BASOPHILS ABSOLUTE: 0 E9/L (ref 0–0.2)
BASOPHILS RELATIVE PERCENT: 0 % (ref 0–2)
BUN BLDV-MCNC: 17 MG/DL (ref 8–23)
BURR CELLS: ABNORMAL
CALCIUM SERPL-MCNC: 8.7 MG/DL (ref 8.6–10.2)
CHLORIDE BLD-SCNC: 101 MMOL/L (ref 98–107)
CO2: 24 MMOL/L (ref 22–29)
CREAT SERPL-MCNC: 1 MG/DL (ref 0.5–1)
CULTURE, RESPIRATORY: ABNORMAL
CULTURE, RESPIRATORY: ABNORMAL
EOSINOPHILS ABSOLUTE: 0 E9/L (ref 0.05–0.5)
EOSINOPHILS RELATIVE PERCENT: 0 % (ref 0–6)
GFR AFRICAN AMERICAN: >60
GFR NON-AFRICAN AMERICAN: 52 ML/MIN/1.73
GLUCOSE BLD-MCNC: 211 MG/DL (ref 74–99)
HCT VFR BLD CALC: 33.9 % (ref 34–48)
HEMOGLOBIN: 10.6 G/DL (ref 11.5–15.5)
HYPOCHROMIA: ABNORMAL
LYMPHOCYTES ABSOLUTE: 0.87 E9/L (ref 1.5–4)
LYMPHOCYTES RELATIVE PERCENT: 17 % (ref 20–42)
MCH RBC QN AUTO: 29.8 PG (ref 26–35)
MCHC RBC AUTO-ENTMCNC: 31.3 % (ref 32–34.5)
MCV RBC AUTO: 95.2 FL (ref 80–99.9)
MONOCYTES ABSOLUTE: 0.05 E9/L (ref 0.1–0.95)
MONOCYTES RELATIVE PERCENT: 1 % (ref 2–12)
NEUTROPHILS ABSOLUTE: 4.18 E9/L (ref 1.8–7.3)
NEUTROPHILS RELATIVE PERCENT: 82 % (ref 43–80)
ORGANISM: ABNORMAL
OVALOCYTES: ABNORMAL
PDW BLD-RTO: 14.8 FL (ref 11.5–15)
PLATELET # BLD: 178 E9/L (ref 130–450)
PMV BLD AUTO: 10.8 FL (ref 7–12)
POIKILOCYTES: ABNORMAL
POTASSIUM REFLEX MAGNESIUM: 4.1 MMOL/L (ref 3.5–5)
RBC # BLD: 3.56 E12/L (ref 3.5–5.5)
SMEAR, RESPIRATORY: ABNORMAL
SODIUM BLD-SCNC: 135 MMOL/L (ref 132–146)
WBC # BLD: 5.1 E9/L (ref 4.5–11.5)

## 2019-12-19 PROCEDURE — 36415 COLL VENOUS BLD VENIPUNCTURE: CPT

## 2019-12-19 PROCEDURE — 94640 AIRWAY INHALATION TREATMENT: CPT

## 2019-12-19 PROCEDURE — 6370000000 HC RX 637 (ALT 250 FOR IP): Performed by: INTERNAL MEDICINE

## 2019-12-19 PROCEDURE — 6360000002 HC RX W HCPCS: Performed by: INTERNAL MEDICINE

## 2019-12-19 PROCEDURE — 6360000002 HC RX W HCPCS: Performed by: STUDENT IN AN ORGANIZED HEALTH CARE EDUCATION/TRAINING PROGRAM

## 2019-12-19 PROCEDURE — C9113 INJ PANTOPRAZOLE SODIUM, VIA: HCPCS | Performed by: STUDENT IN AN ORGANIZED HEALTH CARE EDUCATION/TRAINING PROGRAM

## 2019-12-19 PROCEDURE — 2580000003 HC RX 258

## 2019-12-19 PROCEDURE — 2580000003 HC RX 258: Performed by: STUDENT IN AN ORGANIZED HEALTH CARE EDUCATION/TRAINING PROGRAM

## 2019-12-19 PROCEDURE — 99233 SBSQ HOSP IP/OBS HIGH 50: CPT | Performed by: INTERNAL MEDICINE

## 2019-12-19 PROCEDURE — 97530 THERAPEUTIC ACTIVITIES: CPT

## 2019-12-19 PROCEDURE — 2700000000 HC OXYGEN THERAPY PER DAY

## 2019-12-19 PROCEDURE — 80048 BASIC METABOLIC PNL TOTAL CA: CPT

## 2019-12-19 PROCEDURE — 97535 SELF CARE MNGMENT TRAINING: CPT

## 2019-12-19 PROCEDURE — 1200000000 HC SEMI PRIVATE

## 2019-12-19 PROCEDURE — 85025 COMPLETE CBC W/AUTO DIFF WBC: CPT

## 2019-12-19 PROCEDURE — 2500000003 HC RX 250 WO HCPCS: Performed by: INTERNAL MEDICINE

## 2019-12-19 PROCEDURE — 6370000000 HC RX 637 (ALT 250 FOR IP): Performed by: STUDENT IN AN ORGANIZED HEALTH CARE EDUCATION/TRAINING PROGRAM

## 2019-12-19 RX ORDER — METHYLPREDNISOLONE SODIUM SUCCINATE 125 MG/2ML
60 INJECTION, POWDER, LYOPHILIZED, FOR SOLUTION INTRAMUSCULAR; INTRAVENOUS EVERY 12 HOURS
Status: DISCONTINUED | OUTPATIENT
Start: 2019-12-19 | End: 2019-12-20

## 2019-12-19 RX ADMIN — ISOSORBIDE MONONITRATE 60 MG: 60 TABLET, EXTENDED RELEASE ORAL at 09:16

## 2019-12-19 RX ADMIN — METHYLPREDNISOLONE SODIUM SUCCINATE 60 MG: 125 INJECTION, POWDER, LYOPHILIZED, FOR SOLUTION INTRAMUSCULAR; INTRAVENOUS at 06:06

## 2019-12-19 RX ADMIN — LINEZOLID 600 MG: 600 TABLET, FILM COATED ORAL at 09:16

## 2019-12-19 RX ADMIN — MICONAZOLE NITRATE: 20 POWDER TOPICAL at 09:13

## 2019-12-19 RX ADMIN — METOPROLOL TARTRATE 25 MG: 25 TABLET ORAL at 09:13

## 2019-12-19 RX ADMIN — BUDESONIDE 1000 MCG: 0.5 SUSPENSION RESPIRATORY (INHALATION) at 20:13

## 2019-12-19 RX ADMIN — SODIUM CHLORIDE, PRESERVATIVE FREE 10 ML: 5 INJECTION INTRAVENOUS at 20:45

## 2019-12-19 RX ADMIN — BUDESONIDE 1000 MCG: 0.5 SUSPENSION RESPIRATORY (INHALATION) at 08:50

## 2019-12-19 RX ADMIN — APIXABAN 2.5 MG: 2.5 TABLET, FILM COATED ORAL at 20:44

## 2019-12-19 RX ADMIN — PRAVASTATIN SODIUM 40 MG: 20 TABLET ORAL at 20:44

## 2019-12-19 RX ADMIN — SODIUM CHLORIDE, PRESERVATIVE FREE 10 ML: 5 INJECTION INTRAVENOUS at 09:16

## 2019-12-19 RX ADMIN — NYSTATIN 500000 UNITS: 100000 SUSPENSION ORAL at 13:23

## 2019-12-19 RX ADMIN — METOPROLOL TARTRATE 25 MG: 25 TABLET ORAL at 20:44

## 2019-12-19 RX ADMIN — SODIUM CHLORIDE, PRESERVATIVE FREE 10 ML: 5 INJECTION INTRAVENOUS at 09:13

## 2019-12-19 RX ADMIN — METHYLPREDNISOLONE SODIUM SUCCINATE 60 MG: 125 INJECTION, POWDER, LYOPHILIZED, FOR SOLUTION INTRAMUSCULAR; INTRAVENOUS at 17:18

## 2019-12-19 RX ADMIN — NYSTATIN 500000 UNITS: 100000 SUSPENSION ORAL at 17:03

## 2019-12-19 RX ADMIN — MORPHINE SULFATE 15 MG: 15 TABLET, FILM COATED, EXTENDED RELEASE ORAL at 09:13

## 2019-12-19 RX ADMIN — FORMOTEROL FUMARATE DIHYDRATE 20 MCG: 20 SOLUTION RESPIRATORY (INHALATION) at 08:50

## 2019-12-19 RX ADMIN — WATER 2 ML: 1 INJECTION INTRAMUSCULAR; INTRAVENOUS; SUBCUTANEOUS at 17:18

## 2019-12-19 RX ADMIN — NYSTATIN 500000 UNITS: 100000 SUSPENSION ORAL at 20:44

## 2019-12-19 RX ADMIN — MORPHINE SULFATE 15 MG: 15 TABLET, FILM COATED, EXTENDED RELEASE ORAL at 20:44

## 2019-12-19 RX ADMIN — LINEZOLID 600 MG: 600 TABLET, FILM COATED ORAL at 20:44

## 2019-12-19 RX ADMIN — SODIUM CHLORIDE, PRESERVATIVE FREE 10 ML: 5 INJECTION INTRAVENOUS at 06:07

## 2019-12-19 RX ADMIN — NYSTATIN 500000 UNITS: 100000 SUSPENSION ORAL at 09:13

## 2019-12-19 RX ADMIN — APIXABAN 2.5 MG: 2.5 TABLET, FILM COATED ORAL at 09:13

## 2019-12-19 RX ADMIN — FORMOTEROL FUMARATE DIHYDRATE 20 MCG: 20 SOLUTION RESPIRATORY (INHALATION) at 20:13

## 2019-12-19 RX ADMIN — LEVOTHYROXINE SODIUM 25 MCG: 25 TABLET ORAL at 06:07

## 2019-12-19 RX ADMIN — PANTOPRAZOLE SODIUM 40 MG: 40 INJECTION, POWDER, FOR SOLUTION INTRAVENOUS at 09:13

## 2019-12-19 ASSESSMENT — PAIN SCALES - GENERAL
PAINLEVEL_OUTOF10: 0
PAINLEVEL_OUTOF10: 8
PAINLEVEL_OUTOF10: 7
PAINLEVEL_OUTOF10: 0

## 2019-12-19 ASSESSMENT — PAIN DESCRIPTION - DESCRIPTORS: DESCRIPTORS: DISCOMFORT

## 2019-12-19 ASSESSMENT — PAIN - FUNCTIONAL ASSESSMENT: PAIN_FUNCTIONAL_ASSESSMENT: PREVENTS OR INTERFERES SOME ACTIVE ACTIVITIES AND ADLS

## 2019-12-19 ASSESSMENT — PAIN DESCRIPTION - ORIENTATION: ORIENTATION: LEFT;LOWER

## 2019-12-19 ASSESSMENT — PAIN DESCRIPTION - LOCATION: LOCATION: ABDOMEN

## 2019-12-19 ASSESSMENT — PAIN DESCRIPTION - FREQUENCY: FREQUENCY: INTERMITTENT

## 2019-12-19 ASSESSMENT — PAIN DESCRIPTION - PAIN TYPE: TYPE: ACUTE PAIN

## 2019-12-20 ENCOUNTER — APPOINTMENT (OUTPATIENT)
Dept: GENERAL RADIOLOGY | Age: 84
DRG: 689 | End: 2019-12-20
Payer: MEDICARE

## 2019-12-20 VITALS
RESPIRATION RATE: 16 BRPM | DIASTOLIC BLOOD PRESSURE: 60 MMHG | HEART RATE: 85 BPM | SYSTOLIC BLOOD PRESSURE: 142 MMHG | OXYGEN SATURATION: 96 % | TEMPERATURE: 97.8 F | WEIGHT: 133.25 LBS | BODY MASS INDEX: 24.52 KG/M2 | HEIGHT: 62 IN

## 2019-12-20 LAB
ANION GAP SERPL CALCULATED.3IONS-SCNC: 8 MMOL/L (ref 7–16)
BASOPHILS ABSOLUTE: 0.01 E9/L (ref 0–0.2)
BASOPHILS RELATIVE PERCENT: 0.1 % (ref 0–2)
BLOOD CULTURE, ROUTINE: NORMAL
BUN BLDV-MCNC: 39 MG/DL (ref 8–23)
CALCIUM SERPL-MCNC: 8.7 MG/DL (ref 8.6–10.2)
CHLORIDE BLD-SCNC: 103 MMOL/L (ref 98–107)
CO2: 25 MMOL/L (ref 22–29)
CREAT SERPL-MCNC: 1.1 MG/DL (ref 0.5–1)
CULTURE, BLOOD 2: NORMAL
EOSINOPHILS ABSOLUTE: 0 E9/L (ref 0.05–0.5)
EOSINOPHILS RELATIVE PERCENT: 0 % (ref 0–6)
GFR AFRICAN AMERICAN: 56
GFR NON-AFRICAN AMERICAN: 47 ML/MIN/1.73
GLUCOSE BLD-MCNC: 176 MG/DL (ref 74–99)
HCT VFR BLD CALC: 28.9 % (ref 34–48)
HEMOGLOBIN: 9.1 G/DL (ref 11.5–15.5)
IMMATURE GRANULOCYTES #: 0.05 E9/L
IMMATURE GRANULOCYTES %: 0.6 % (ref 0–5)
LYMPHOCYTES ABSOLUTE: 0.79 E9/L (ref 1.5–4)
LYMPHOCYTES RELATIVE PERCENT: 9.1 % (ref 20–42)
MCH RBC QN AUTO: 29.9 PG (ref 26–35)
MCHC RBC AUTO-ENTMCNC: 31.5 % (ref 32–34.5)
MCV RBC AUTO: 95.1 FL (ref 80–99.9)
MONOCYTES ABSOLUTE: 0.37 E9/L (ref 0.1–0.95)
MONOCYTES RELATIVE PERCENT: 4.3 % (ref 2–12)
NEUTROPHILS ABSOLUTE: 7.42 E9/L (ref 1.8–7.3)
NEUTROPHILS RELATIVE PERCENT: 85.9 % (ref 43–80)
PDW BLD-RTO: 15 FL (ref 11.5–15)
PLATELET # BLD: 163 E9/L (ref 130–450)
PMV BLD AUTO: 11.3 FL (ref 7–12)
POTASSIUM REFLEX MAGNESIUM: 4.5 MMOL/L (ref 3.5–5)
RBC # BLD: 3.04 E12/L (ref 3.5–5.5)
SODIUM BLD-SCNC: 136 MMOL/L (ref 132–146)
WBC # BLD: 8.6 E9/L (ref 4.5–11.5)

## 2019-12-20 PROCEDURE — 2580000003 HC RX 258: Performed by: STUDENT IN AN ORGANIZED HEALTH CARE EDUCATION/TRAINING PROGRAM

## 2019-12-20 PROCEDURE — 85025 COMPLETE CBC W/AUTO DIFF WBC: CPT

## 2019-12-20 PROCEDURE — 6370000000 HC RX 637 (ALT 250 FOR IP): Performed by: INTERNAL MEDICINE

## 2019-12-20 PROCEDURE — 2700000000 HC OXYGEN THERAPY PER DAY

## 2019-12-20 PROCEDURE — 94640 AIRWAY INHALATION TREATMENT: CPT

## 2019-12-20 PROCEDURE — C9113 INJ PANTOPRAZOLE SODIUM, VIA: HCPCS | Performed by: STUDENT IN AN ORGANIZED HEALTH CARE EDUCATION/TRAINING PROGRAM

## 2019-12-20 PROCEDURE — 6370000000 HC RX 637 (ALT 250 FOR IP): Performed by: STUDENT IN AN ORGANIZED HEALTH CARE EDUCATION/TRAINING PROGRAM

## 2019-12-20 PROCEDURE — 6360000002 HC RX W HCPCS: Performed by: INTERNAL MEDICINE

## 2019-12-20 PROCEDURE — 80048 BASIC METABOLIC PNL TOTAL CA: CPT

## 2019-12-20 PROCEDURE — 99239 HOSP IP/OBS DSCHRG MGMT >30: CPT | Performed by: INTERNAL MEDICINE

## 2019-12-20 PROCEDURE — 6360000002 HC RX W HCPCS: Performed by: STUDENT IN AN ORGANIZED HEALTH CARE EDUCATION/TRAINING PROGRAM

## 2019-12-20 PROCEDURE — 71046 X-RAY EXAM CHEST 2 VIEWS: CPT

## 2019-12-20 PROCEDURE — 36415 COLL VENOUS BLD VENIPUNCTURE: CPT

## 2019-12-20 RX ORDER — PREDNISONE 20 MG/1
40 TABLET ORAL DAILY
Qty: 10 TABLET | Refills: 0 | DISCHARGE
Start: 2019-12-20 | End: 2019-12-25

## 2019-12-20 RX ORDER — BUDESONIDE 0.5 MG/2ML
1000 INHALANT ORAL 2 TIMES DAILY
Qty: 60 AMPULE | Refills: 3 | Status: ON HOLD | DISCHARGE
Start: 2019-12-20

## 2019-12-20 RX ORDER — PREDNISONE 20 MG/1
40 TABLET ORAL DAILY
Status: DISCONTINUED | OUTPATIENT
Start: 2019-12-21 | End: 2019-12-20 | Stop reason: HOSPADM

## 2019-12-20 RX ORDER — LINEZOLID 600 MG/1
600 TABLET, FILM COATED ORAL EVERY 12 HOURS SCHEDULED
Qty: 16 TABLET | Refills: 0 | DISCHARGE
Start: 2019-12-20 | End: 2019-12-28

## 2019-12-20 RX ORDER — FORMOTEROL FUMARATE 20 UG/2ML
20 SOLUTION RESPIRATORY (INHALATION) 2 TIMES DAILY
Qty: 100 ML | Refills: 2 | Status: ON HOLD | DISCHARGE
Start: 2019-12-20

## 2019-12-20 RX ADMIN — SODIUM CHLORIDE, PRESERVATIVE FREE 10 ML: 5 INJECTION INTRAVENOUS at 09:29

## 2019-12-20 RX ADMIN — NYSTATIN 500000 UNITS: 100000 SUSPENSION ORAL at 09:28

## 2019-12-20 RX ADMIN — LEVOTHYROXINE SODIUM 25 MCG: 25 TABLET ORAL at 06:39

## 2019-12-20 RX ADMIN — METOPROLOL TARTRATE 25 MG: 25 TABLET ORAL at 09:27

## 2019-12-20 RX ADMIN — ISOSORBIDE MONONITRATE 60 MG: 60 TABLET, EXTENDED RELEASE ORAL at 09:27

## 2019-12-20 RX ADMIN — SODIUM CHLORIDE, PRESERVATIVE FREE 10 ML: 5 INJECTION INTRAVENOUS at 06:39

## 2019-12-20 RX ADMIN — PANTOPRAZOLE SODIUM 40 MG: 40 INJECTION, POWDER, FOR SOLUTION INTRAVENOUS at 09:27

## 2019-12-20 RX ADMIN — METHYLPREDNISOLONE SODIUM SUCCINATE 60 MG: 125 INJECTION, POWDER, LYOPHILIZED, FOR SOLUTION INTRAMUSCULAR; INTRAVENOUS at 06:39

## 2019-12-20 RX ADMIN — FORMOTEROL FUMARATE DIHYDRATE 20 MCG: 20 SOLUTION RESPIRATORY (INHALATION) at 09:57

## 2019-12-20 RX ADMIN — APIXABAN 2.5 MG: 2.5 TABLET, FILM COATED ORAL at 09:28

## 2019-12-20 RX ADMIN — MICONAZOLE NITRATE: 20 POWDER TOPICAL at 09:27

## 2019-12-20 RX ADMIN — NYSTATIN 500000 UNITS: 100000 SUSPENSION ORAL at 14:07

## 2019-12-20 RX ADMIN — NYSTATIN 500000 UNITS: 100000 SUSPENSION ORAL at 16:45

## 2019-12-20 RX ADMIN — LINEZOLID 600 MG: 600 TABLET, FILM COATED ORAL at 09:28

## 2019-12-20 RX ADMIN — SODIUM CHLORIDE, PRESERVATIVE FREE 10 ML: 5 INJECTION INTRAVENOUS at 09:27

## 2019-12-20 RX ADMIN — MORPHINE SULFATE 15 MG: 15 TABLET, FILM COATED, EXTENDED RELEASE ORAL at 09:27

## 2019-12-20 RX ADMIN — BUDESONIDE 1000 MCG: 0.5 SUSPENSION RESPIRATORY (INHALATION) at 09:57

## 2019-12-20 ASSESSMENT — PAIN SCALES - GENERAL
PAINLEVEL_OUTOF10: 0
PAINLEVEL_OUTOF10: 3

## 2020-01-05 ENCOUNTER — APPOINTMENT (OUTPATIENT)
Dept: GENERAL RADIOLOGY | Age: 85
DRG: 871 | End: 2020-01-05
Payer: MEDICARE

## 2020-01-05 ENCOUNTER — HOSPITAL ENCOUNTER (INPATIENT)
Age: 85
LOS: 6 days | Discharge: NURSING, REHAB OR LONG-TERM CARE | DRG: 871 | End: 2020-01-11
Attending: EMERGENCY MEDICINE | Admitting: INTERNAL MEDICINE
Payer: MEDICARE

## 2020-01-05 PROBLEM — J18.9 PNEUMONIA: Status: ACTIVE | Noted: 2020-01-05

## 2020-01-05 LAB
ADENOVIRUS BY PCR: NOT DETECTED
ALBUMIN SERPL-MCNC: 3.2 G/DL (ref 3.5–5.2)
ALP BLD-CCNC: 80 U/L (ref 35–104)
ALT SERPL-CCNC: 14 U/L (ref 0–32)
ANION GAP SERPL CALCULATED.3IONS-SCNC: 10 MMOL/L (ref 7–16)
ANISOCYTOSIS: ABNORMAL
AST SERPL-CCNC: 15 U/L (ref 0–31)
BASOPHILIC STIPPLING: ABNORMAL
BASOPHILS ABSOLUTE: 0.02 E9/L (ref 0–0.2)
BASOPHILS RELATIVE PERCENT: 0.1 % (ref 0–2)
BILIRUB SERPL-MCNC: 0.4 MG/DL (ref 0–1.2)
BORDETELLA PARAPERTUSSIS BY PCR: NOT DETECTED
BORDETELLA PERTUSSIS BY PCR: NOT DETECTED
BUN BLDV-MCNC: 18 MG/DL (ref 8–23)
CALCIUM SERPL-MCNC: 8.6 MG/DL (ref 8.6–10.2)
CHLAMYDOPHILIA PNEUMONIAE BY PCR: NOT DETECTED
CHLORIDE BLD-SCNC: 105 MMOL/L (ref 98–107)
CO2: 23 MMOL/L (ref 22–29)
CORONAVIRUS 229E BY PCR: NOT DETECTED
CORONAVIRUS HKU1 BY PCR: NOT DETECTED
CORONAVIRUS NL63 BY PCR: NOT DETECTED
CORONAVIRUS OC43 BY PCR: NOT DETECTED
CREAT SERPL-MCNC: 0.9 MG/DL (ref 0.5–1)
EKG ATRIAL RATE: 97 BPM
EKG Q-T INTERVAL: 388 MS
EKG QRS DURATION: 96 MS
EKG QTC CALCULATION (BAZETT): 458 MS
EKG R AXIS: 67 DEGREES
EKG T AXIS: -12 DEGREES
EKG VENTRICULAR RATE: 84 BPM
EOSINOPHILS ABSOLUTE: 0 E9/L (ref 0.05–0.5)
EOSINOPHILS RELATIVE PERCENT: 0 % (ref 0–6)
GFR AFRICAN AMERICAN: >60
GFR NON-AFRICAN AMERICAN: 59 ML/MIN/1.73
GLUCOSE BLD-MCNC: 129 MG/DL (ref 74–99)
HCT VFR BLD CALC: 24.3 % (ref 34–48)
HEMOGLOBIN: 7.8 G/DL (ref 11.5–15.5)
HUMAN METAPNEUMOVIRUS BY PCR: NOT DETECTED
HUMAN RHINOVIRUS/ENTEROVIRUS BY PCR: NOT DETECTED
IMMATURE GRANULOCYTES #: 0.14 E9/L
IMMATURE GRANULOCYTES %: 0.8 % (ref 0–5)
INFLUENZA A BY PCR: NOT DETECTED
INFLUENZA B BY PCR: NOT DETECTED
INR BLD: 1.4
LYMPHOCYTES ABSOLUTE: 0.58 E9/L (ref 1.5–4)
LYMPHOCYTES RELATIVE PERCENT: 3.4 % (ref 20–42)
MCH RBC QN AUTO: 30.7 PG (ref 26–35)
MCHC RBC AUTO-ENTMCNC: 32.1 % (ref 32–34.5)
MCV RBC AUTO: 95.7 FL (ref 80–99.9)
MONOCYTES ABSOLUTE: 1.11 E9/L (ref 0.1–0.95)
MONOCYTES RELATIVE PERCENT: 6.4 % (ref 2–12)
MYCOPLASMA PNEUMONIAE BY PCR: NOT DETECTED
NEUTROPHILS ABSOLUTE: 15.45 E9/L (ref 1.8–7.3)
NEUTROPHILS RELATIVE PERCENT: 89.3 % (ref 43–80)
OVALOCYTES: ABNORMAL
PARAINFLUENZA VIRUS 1 BY PCR: NOT DETECTED
PARAINFLUENZA VIRUS 2 BY PCR: NOT DETECTED
PARAINFLUENZA VIRUS 3 BY PCR: NOT DETECTED
PARAINFLUENZA VIRUS 4 BY PCR: NOT DETECTED
PDW BLD-RTO: 14.4 FL (ref 11.5–15)
PLATELET # BLD: 102 E9/L (ref 130–450)
PMV BLD AUTO: 10 FL (ref 7–12)
POIKILOCYTES: ABNORMAL
POLYCHROMASIA: ABNORMAL
POTASSIUM REFLEX MAGNESIUM: 4.2 MMOL/L (ref 3.5–5)
PRO-BNP: 7060 PG/ML (ref 0–450)
PROTHROMBIN TIME: 15.7 SEC (ref 9.3–12.4)
RBC # BLD: 2.54 E12/L (ref 3.5–5.5)
RESPIRATORY SYNCYTIAL VIRUS BY PCR: DETECTED
SODIUM BLD-SCNC: 138 MMOL/L (ref 132–146)
TOTAL PROTEIN: 5.5 G/DL (ref 6.4–8.3)
TOXIC GRANULATION: ABNORMAL
TROPONIN: 0.03 NG/ML (ref 0–0.03)
WBC # BLD: 17.3 E9/L (ref 4.5–11.5)

## 2020-01-05 PROCEDURE — 6370000000 HC RX 637 (ALT 250 FOR IP): Performed by: EMERGENCY MEDICINE

## 2020-01-05 PROCEDURE — 87081 CULTURE SCREEN ONLY: CPT

## 2020-01-05 PROCEDURE — 94664 DEMO&/EVAL PT USE INHALER: CPT

## 2020-01-05 PROCEDURE — 84484 ASSAY OF TROPONIN QUANT: CPT

## 2020-01-05 PROCEDURE — 83880 ASSAY OF NATRIURETIC PEPTIDE: CPT

## 2020-01-05 PROCEDURE — 6360000002 HC RX W HCPCS: Performed by: EMERGENCY MEDICINE

## 2020-01-05 PROCEDURE — 93005 ELECTROCARDIOGRAM TRACING: CPT | Performed by: EMERGENCY MEDICINE

## 2020-01-05 PROCEDURE — 6370000000 HC RX 637 (ALT 250 FOR IP): Performed by: INTERNAL MEDICINE

## 2020-01-05 PROCEDURE — 2580000003 HC RX 258: Performed by: INTERNAL MEDICINE

## 2020-01-05 PROCEDURE — 0100U HC RESPIRPTHGN MULT REV TRANS & AMP PRB TECH 21 TRGT: CPT

## 2020-01-05 PROCEDURE — 2580000003 HC RX 258: Performed by: EMERGENCY MEDICINE

## 2020-01-05 PROCEDURE — 87450 HC DIRECT STREP B ANTIGEN: CPT

## 2020-01-05 PROCEDURE — 85610 PROTHROMBIN TIME: CPT

## 2020-01-05 PROCEDURE — 1200000000 HC SEMI PRIVATE

## 2020-01-05 PROCEDURE — 6360000002 HC RX W HCPCS: Performed by: INTERNAL MEDICINE

## 2020-01-05 PROCEDURE — 36415 COLL VENOUS BLD VENIPUNCTURE: CPT

## 2020-01-05 PROCEDURE — 80053 COMPREHEN METABOLIC PANEL: CPT

## 2020-01-05 PROCEDURE — 2700000000 HC OXYGEN THERAPY PER DAY

## 2020-01-05 PROCEDURE — 94640 AIRWAY INHALATION TREATMENT: CPT

## 2020-01-05 PROCEDURE — 85025 COMPLETE CBC W/AUTO DIFF WBC: CPT

## 2020-01-05 PROCEDURE — 99285 EMERGENCY DEPT VISIT HI MDM: CPT

## 2020-01-05 PROCEDURE — 87040 BLOOD CULTURE FOR BACTERIA: CPT

## 2020-01-05 PROCEDURE — 71045 X-RAY EXAM CHEST 1 VIEW: CPT

## 2020-01-05 PROCEDURE — 93010 ELECTROCARDIOGRAM REPORT: CPT | Performed by: INTERNAL MEDICINE

## 2020-01-05 RX ORDER — LISINOPRIL 5 MG/1
5 TABLET ORAL DAILY
Status: DISCONTINUED | OUTPATIENT
Start: 2020-01-05 | End: 2020-01-11 | Stop reason: HOSPADM

## 2020-01-05 RX ORDER — SODIUM CHLORIDE 9 MG/ML
INJECTION, SOLUTION INTRAVENOUS CONTINUOUS
Status: DISCONTINUED | OUTPATIENT
Start: 2020-01-05 | End: 2020-01-08

## 2020-01-05 RX ORDER — IPRATROPIUM BROMIDE AND ALBUTEROL SULFATE 2.5; .5 MG/3ML; MG/3ML
1 SOLUTION RESPIRATORY (INHALATION)
Status: DISCONTINUED | OUTPATIENT
Start: 2020-01-05 | End: 2020-01-11 | Stop reason: HOSPADM

## 2020-01-05 RX ORDER — SODIUM CHLORIDE 0.9 % (FLUSH) 0.9 %
10 SYRINGE (ML) INJECTION PRN
Status: DISCONTINUED | OUTPATIENT
Start: 2020-01-05 | End: 2020-01-11 | Stop reason: HOSPADM

## 2020-01-05 RX ORDER — IPRATROPIUM BROMIDE AND ALBUTEROL SULFATE 2.5; .5 MG/3ML; MG/3ML
1 SOLUTION RESPIRATORY (INHALATION) ONCE
Status: COMPLETED | OUTPATIENT
Start: 2020-01-05 | End: 2020-01-05

## 2020-01-05 RX ORDER — FORMOTEROL FUMARATE 20 UG/2ML
20 SOLUTION RESPIRATORY (INHALATION) 2 TIMES DAILY
Status: DISCONTINUED | OUTPATIENT
Start: 2020-01-05 | End: 2020-01-11 | Stop reason: HOSPADM

## 2020-01-05 RX ORDER — M-VIT,TX,IRON,MINS/CALC/FOLIC 27MG-0.4MG
1 TABLET ORAL
Status: DISCONTINUED | OUTPATIENT
Start: 2020-01-05 | End: 2020-01-11 | Stop reason: HOSPADM

## 2020-01-05 RX ORDER — LEVOTHYROXINE SODIUM 0.03 MG/1
25 TABLET ORAL DAILY
Status: DISCONTINUED | OUTPATIENT
Start: 2020-01-05 | End: 2020-01-11 | Stop reason: HOSPADM

## 2020-01-05 RX ORDER — ISOSORBIDE MONONITRATE 60 MG/1
60 TABLET, EXTENDED RELEASE ORAL DAILY
Status: DISCONTINUED | OUTPATIENT
Start: 2020-01-05 | End: 2020-01-11 | Stop reason: HOSPADM

## 2020-01-05 RX ORDER — FUROSEMIDE 20 MG/1
20 TABLET ORAL
Status: DISCONTINUED | OUTPATIENT
Start: 2020-01-06 | End: 2020-01-09

## 2020-01-05 RX ORDER — MORPHINE SULFATE 30 MG/1
15 TABLET ORAL 2 TIMES DAILY
Status: DISCONTINUED | OUTPATIENT
Start: 2020-01-05 | End: 2020-01-11 | Stop reason: HOSPADM

## 2020-01-05 RX ORDER — DOCUSATE SODIUM 100 MG/1
100 CAPSULE, LIQUID FILLED ORAL 2 TIMES DAILY PRN
Status: DISCONTINUED | OUTPATIENT
Start: 2020-01-05 | End: 2020-01-11 | Stop reason: HOSPADM

## 2020-01-05 RX ORDER — PANTOPRAZOLE SODIUM 40 MG/1
40 TABLET, DELAYED RELEASE ORAL
Status: DISCONTINUED | OUTPATIENT
Start: 2020-01-06 | End: 2020-01-11 | Stop reason: HOSPADM

## 2020-01-05 RX ORDER — OMEPRAZOLE 20 MG/1
20 CAPSULE, DELAYED RELEASE ORAL DAILY
Status: DISCONTINUED | OUTPATIENT
Start: 2020-01-05 | End: 2020-01-05 | Stop reason: CLARIF

## 2020-01-05 RX ORDER — ACETAMINOPHEN 325 MG/1
650 TABLET ORAL EVERY 4 HOURS PRN
Status: DISCONTINUED | OUTPATIENT
Start: 2020-01-05 | End: 2020-01-11 | Stop reason: HOSPADM

## 2020-01-05 RX ORDER — HYDROCODONE BITARTRATE AND ACETAMINOPHEN 10; 325 MG/1; MG/1
1 TABLET ORAL EVERY 12 HOURS PRN
Status: DISCONTINUED | OUTPATIENT
Start: 2020-01-05 | End: 2020-01-11 | Stop reason: HOSPADM

## 2020-01-05 RX ORDER — BUDESONIDE 0.5 MG/2ML
1000 INHALANT ORAL 2 TIMES DAILY
Status: DISCONTINUED | OUTPATIENT
Start: 2020-01-05 | End: 2020-01-11 | Stop reason: HOSPADM

## 2020-01-05 RX ORDER — ONDANSETRON 2 MG/ML
4 INJECTION INTRAMUSCULAR; INTRAVENOUS EVERY 6 HOURS PRN
Status: DISCONTINUED | OUTPATIENT
Start: 2020-01-05 | End: 2020-01-11 | Stop reason: HOSPADM

## 2020-01-05 RX ORDER — SODIUM CHLORIDE 0.9 % (FLUSH) 0.9 %
10 SYRINGE (ML) INJECTION EVERY 12 HOURS SCHEDULED
Status: DISCONTINUED | OUTPATIENT
Start: 2020-01-05 | End: 2020-01-11 | Stop reason: HOSPADM

## 2020-01-05 RX ORDER — PRAVASTATIN SODIUM 20 MG
40 TABLET ORAL NIGHTLY
Status: DISCONTINUED | OUTPATIENT
Start: 2020-01-05 | End: 2020-01-11 | Stop reason: HOSPADM

## 2020-01-05 RX ORDER — TRAZODONE HYDROCHLORIDE 50 MG/1
100 TABLET ORAL NIGHTLY
Status: DISCONTINUED | OUTPATIENT
Start: 2020-01-05 | End: 2020-01-11 | Stop reason: HOSPADM

## 2020-01-05 RX ADMIN — IPRATROPIUM BROMIDE AND ALBUTEROL SULFATE 1 AMPULE: .5; 3 SOLUTION RESPIRATORY (INHALATION) at 19:33

## 2020-01-05 RX ADMIN — MAGNESIUM GLUCONATE 500 MG ORAL TABLET 200 MG: 500 TABLET ORAL at 20:36

## 2020-01-05 RX ADMIN — SODIUM CHLORIDE: 9 INJECTION, SOLUTION INTRAVENOUS at 16:34

## 2020-01-05 RX ADMIN — LISINOPRIL 5 MG: 5 TABLET ORAL at 16:34

## 2020-01-05 RX ADMIN — SODIUM CHLORIDE, PRESERVATIVE FREE 10 ML: 5 INJECTION INTRAVENOUS at 20:36

## 2020-01-05 RX ADMIN — VANCOMYCIN HYDROCHLORIDE 1.5 G: 10 INJECTION, POWDER, LYOPHILIZED, FOR SOLUTION INTRAVENOUS at 19:16

## 2020-01-05 RX ADMIN — CEFEPIME HYDROCHLORIDE 2 G: 2 INJECTION, POWDER, FOR SOLUTION INTRAVENOUS at 14:32

## 2020-01-05 RX ADMIN — IPRATROPIUM BROMIDE AND ALBUTEROL SULFATE 1 AMPULE: .5; 3 SOLUTION RESPIRATORY (INHALATION) at 15:20

## 2020-01-05 RX ADMIN — BUDESONIDE 1000 MCG: 0.5 SUSPENSION RESPIRATORY (INHALATION) at 19:33

## 2020-01-05 RX ADMIN — MORPHINE SULFATE 15 MG: 30 TABLET ORAL at 20:36

## 2020-01-05 RX ADMIN — APIXABAN 2.5 MG: 2.5 TABLET, FILM COATED ORAL at 20:36

## 2020-01-05 RX ADMIN — NYSTATIN 500000 UNITS: 100000 SUSPENSION ORAL at 16:33

## 2020-01-05 RX ADMIN — TRAZODONE HYDROCHLORIDE 100 MG: 50 TABLET ORAL at 20:36

## 2020-01-05 RX ADMIN — METOPROLOL TARTRATE 25 MG: 25 TABLET ORAL at 20:36

## 2020-01-05 RX ADMIN — LEVOTHYROXINE SODIUM 25 MCG: 25 TABLET ORAL at 16:34

## 2020-01-05 RX ADMIN — ISOSORBIDE MONONITRATE 60 MG: 60 TABLET, EXTENDED RELEASE ORAL at 16:34

## 2020-01-05 RX ADMIN — Medication 1 TABLET: at 16:34

## 2020-01-05 RX ADMIN — PRAVASTATIN SODIUM 40 MG: 20 TABLET ORAL at 20:36

## 2020-01-05 RX ADMIN — FORMOTEROL FUMARATE DIHYDRATE 20 MCG: 20 SOLUTION RESPIRATORY (INHALATION) at 19:33

## 2020-01-05 RX ADMIN — IPRATROPIUM BROMIDE AND ALBUTEROL SULFATE 1 AMPULE: .5; 3 SOLUTION RESPIRATORY (INHALATION) at 12:41

## 2020-01-05 RX ADMIN — NYSTATIN 500000 UNITS: 100000 SUSPENSION ORAL at 20:36

## 2020-01-05 RX ADMIN — CEFEPIME HYDROCHLORIDE 1 G: 1 INJECTION, POWDER, FOR SOLUTION INTRAMUSCULAR; INTRAVENOUS at 23:37

## 2020-01-05 ASSESSMENT — ENCOUNTER SYMPTOMS
EYE PAIN: 0
SINUS PRESSURE: 0
BACK PAIN: 0
SORE THROAT: 0
EYE DISCHARGE: 0
VOMITING: 0
EYE REDNESS: 0
DIARRHEA: 0
COUGH: 0
SHORTNESS OF BREATH: 1
WHEEZING: 0
ABDOMINAL DISTENTION: 0
NAUSEA: 0

## 2020-01-05 ASSESSMENT — PAIN SCALES - GENERAL
PAINLEVEL_OUTOF10: 0
PAINLEVEL_OUTOF10: 0

## 2020-01-05 NOTE — ED NOTES
Bed: 10  Expected date:   Expected time:   Means of arrival:   Comments:  EMS - LF       Krystyna Herrera.  SUSIE Dc  01/05/20 1024

## 2020-01-05 NOTE — ED PROVIDER NOTES
effort is normal. No respiratory distress. Breath sounds: Wheezing and rales present. Comments: Notable diffuse inspiratory rales appreciated in the upper middle and lower right lobe  Abdominal:      General: Bowel sounds are normal.      Palpations: Abdomen is soft. Tenderness: There is no tenderness. There is no guarding or rebound. Skin:     General: Skin is warm and dry. Neurological:      Mental Status: She is alert and oriented to person, place, and time. Cranial Nerves: No cranial nerve deficit. Coordination: Coordination normal.          Procedures     MDM  Number of Diagnoses or Management Options  Acute respiratory failure with hypoxia [facility]):   Pneumonia due to organism:   Diagnosis management comments: 80-year-old female history of coronary artery disease, chronic kidney disease, paroxysmal atrial fibrillation, brought to the ED by EMS for lethargy and hypoxia. Patient desaturates to 89% on room air, improved to 94 to 95% on nasal cannula at 2 L. She normally does not wear oxygen. Exam notable for diffuse rales in the right lobe. Chest x-ray significant for right lower lobe pneumonia. Patient was started on broad-spectrum antibiotic coverage for healthcare associated pneumonia. Oxygen saturation continue to improve with breathing treatment and oxygen therapy. The patient was admitted. EKG:  This EKG is signed and interpreted by me.     Rate: 84  Rhythm: Atrial fibrillation  Interpretation: Atrial fibrillation, no acute ST elevations or depressions, adequate R wave progression, incomplete right bundle branch block  Comparison: No acute changes were compared to previous EKG captured on 9/25/2018    --------------------------------------------- PAST HISTORY ---------------------------------------------  Past Medical History:  has a past medical history of A-fib ([facility] Arthritis, Atrial fibrillation ([facility] Blood transfusion, Blood transfusion reaction, CAD (coronary artery disease), Cancer ([facility] CHF (congestive heart failure) ([facility] Chronic back pain, Chronic kidney disease, GERD (gastroesophageal reflux disease), GI bleed, History of blood transfusion, Hyperlipidemia, Hypertension, Pacemaker, Psychiatric problem, Syncope, and Thyroid disease. Past Surgical History:  has a past surgical history that includes Cardiac pacemaker placement (3/8/11); skin biopsy; ECHO Compl W Dop Color Flow (5/23/2012); Coronary artery bypass graft; Coronary angioplasty with stent (2009); Colonoscopy; Tonsillectomy; Appendectomy; Hysterectomy; Endoscopy, colon, diagnostic; Cardioversion (06/28/2012); Cardiac catheterization (03/13/2014); Breast surgery; eye surgery; pacemaker placement; Colonoscopy (10/3/2018); and pr lap,surg,colectomy, partial, w/anast (N/A, 10/5/2018). Social History:  reports that she has never smoked. She has never used smokeless tobacco. She reports that she does not drink alcohol or use drugs. Family History: family history includes Cancer in her sister; Heart Disease in her brother, father, and mother; Stroke in her brother and father. The patients home medications have been reviewed. Allergies: Sulfa antibiotics; Adhesive tape;  Bactrim; and Pcn [penicillins]    -------------------------------------------------- RESULTS -------------------------------------------------    LABS:  Results for orders placed or performed during the hospital encounter of 01/05/20   CBC Auto Differential   Result Value Ref Range    WBC 17.3 (H) 4.5 - 11.5 E9/L    RBC 2.54 (L) 3.50 - 5.50 E12/L    Hemoglobin 7.8 (L) 11.5 - 15.5 g/dL    Hematocrit 24.3 (L) 34.0 - 48.0 %    MCV 95.7 80.0 - 99.9 fL    MCH 30.7 26.0 - 35.0 pg    MCHC 32.1 32.0 - 34.5 %    RDW 14.4 11.5 - 15.0 fL    Platelets 107 (L) 183 - 450 E9/L    MPV 10.0 7.0 - 12.0 fL    Neutrophils % 89.3 (H) 43.0 - 80.0 %    Immature Granulocytes % 0.8 0.0 - 5.0 %    Lymphocytes % 3.4 (L) 20.0 - 42.0 %    Monocytes % 6.4 2.0 - 12.0 %    Eosinophils % 0.0 0.0 - 6.0 %    Basophils % 0.1 0.0 - 2.0 %    Neutrophils Absolute 15.45 (H) 1.80 - 7.30 E9/L    Immature Granulocytes # 0.14 E9/L    Lymphocytes Absolute 0.58 (L) 1.50 - 4.00 E9/L    Monocytes Absolute 1.11 (H) 0.10 - 0.95 E9/L    Eosinophils Absolute 0.00 (L) 0.05 - 0.50 E9/L    Basophils Absolute 0.02 0.00 - 0.20 E9/L    Toxic Granulation 1+     Anisocytosis 1+     Polychromasia 1+     Poikilocytes 2+     Ovalocytes 2+     Basophilic Stippling 1+    Comprehensive Metabolic Panel w/ Reflex to MG   Result Value Ref Range    Sodium 138 132 - 146 mmol/L    Potassium reflex Magnesium 4.2 3.5 - 5.0 mmol/L    Chloride 105 98 - 107 mmol/L    CO2 23 22 - 29 mmol/L    Anion Gap 10 7 - 16 mmol/L    Glucose 129 (H) 74 - 99 mg/dL    BUN 18 8 - 23 mg/dL    CREATININE 0.9 0.5 - 1.0 mg/dL    GFR Non-African American 59 >=60 mL/min/1.73    GFR African American >60     Calcium 8.6 8.6 - 10.2 mg/dL    Total Protein 5.5 (L) 6.4 - 8.3 g/dL    Alb 3.2 (L) 3.5 - 5.2 g/dL    Total Bilirubin 0.4 0.0 - 1.2 mg/dL    Alkaline Phosphatase 80 35 - 104 U/L    ALT 14 0 - 32 U/L    AST 15 0 - 31 U/L   Brain Natriuretic Peptide   Result Value Ref Range    Pro-BNP 7,060 (H) 0 - 450 pg/mL   Protime-INR   Result Value Ref Range    Protime 15.7 (H) 9.3 - 12.4 sec    INR 1.4    Troponin   Result Value Ref Range    Troponin 0.03 0.00 - 0.03 ng/mL   EKG 12 Lead   Result Value Ref Range    Ventricular Rate 84 BPM    Atrial Rate 97 BPM    QRS Duration 96 ms    Q-T Interval 388 ms    QTc Calculation (Bazett) 458 ms    R Axis 67 degrees    T Axis -12 degrees       RADIOLOGY:  Ct Abdomen Pelvis Wo Contrast    Result Date: 12/15/2019  Patient MRN:  [id number] : 1928 Age: 80 years Gender: Female Order Date:  12/15/2019 4:15 PM EXAM: CT ABDOMEN PELVIS WO CONTRAST NUMBER OF IMAGES \ views:  12 INDICATION: Abdominal pain, urinary tract infection, urinary calculi, reported pyelonephritis COMPARISON: CT abdomen pelvis and counseling regarding the diagnosis and prognosis. Their questions are answered at this time and they are agreeable with the plan of admission.    --------------------------------- ADDITIONAL PROVIDER NOTES ---------------------------------  Consultations:  Time: 1240. Spoke with Dr. Samantha Hooper. Discussed case. They will admit the patient. This patient's ED course included: a personal history and physicial examination    This patient has remained hemodynamically stable during their ED course. Diagnosis:  1. Pneumonia due to organism    2. Acute respiratory failure with hypoxia (HCC)        Disposition:  Patient's disposition: Admit to med/surg floor  Patient's condition is stable.          Maty Tucker DO  Resident  01/05/20 8560

## 2020-01-06 LAB
ANION GAP SERPL CALCULATED.3IONS-SCNC: 7 MMOL/L (ref 7–16)
BUN BLDV-MCNC: 19 MG/DL (ref 8–23)
CALCIUM SERPL-MCNC: 8.5 MG/DL (ref 8.6–10.2)
CHLORIDE BLD-SCNC: 103 MMOL/L (ref 98–107)
CO2: 24 MMOL/L (ref 22–29)
CREAT SERPL-MCNC: 0.9 MG/DL (ref 0.5–1)
GFR AFRICAN AMERICAN: >60
GFR NON-AFRICAN AMERICAN: 59 ML/MIN/1.73
GLUCOSE BLD-MCNC: 99 MG/DL (ref 74–99)
HCT VFR BLD CALC: 23.6 % (ref 34–48)
HCT VFR BLD CALC: 25.3 % (ref 34–48)
HEMOGLOBIN: 7.3 G/DL (ref 11.5–15.5)
HEMOGLOBIN: 8.1 G/DL (ref 11.5–15.5)
L. PNEUMOPHILA SEROGP 1 UR AG: NORMAL
MCH RBC QN AUTO: 30 PG (ref 26–35)
MCHC RBC AUTO-ENTMCNC: 30.9 % (ref 32–34.5)
MCV RBC AUTO: 97.1 FL (ref 80–99.9)
PDW BLD-RTO: 15 FL (ref 11.5–15)
PLATELET # BLD: 101 E9/L (ref 130–450)
PMV BLD AUTO: 9.9 FL (ref 7–12)
POTASSIUM REFLEX MAGNESIUM: 4.5 MMOL/L (ref 3.5–5)
PROCALCITONIN: 0.76 NG/ML (ref 0–0.08)
RBC # BLD: 2.43 E12/L (ref 3.5–5.5)
SODIUM BLD-SCNC: 134 MMOL/L (ref 132–146)
STREP PNEUMONIAE ANTIGEN, URINE: NORMAL
WBC # BLD: 15.5 E9/L (ref 4.5–11.5)

## 2020-01-06 PROCEDURE — 2580000003 HC RX 258: Performed by: INTERNAL MEDICINE

## 2020-01-06 PROCEDURE — 36415 COLL VENOUS BLD VENIPUNCTURE: CPT

## 2020-01-06 PROCEDURE — 92526 ORAL FUNCTION THERAPY: CPT | Performed by: SPEECH-LANGUAGE PATHOLOGIST

## 2020-01-06 PROCEDURE — 6360000002 HC RX W HCPCS: Performed by: INTERNAL MEDICINE

## 2020-01-06 PROCEDURE — 85018 HEMOGLOBIN: CPT

## 2020-01-06 PROCEDURE — 2700000000 HC OXYGEN THERAPY PER DAY

## 2020-01-06 PROCEDURE — 6370000000 HC RX 637 (ALT 250 FOR IP): Performed by: INTERNAL MEDICINE

## 2020-01-06 PROCEDURE — 85027 COMPLETE CBC AUTOMATED: CPT

## 2020-01-06 PROCEDURE — 92610 EVALUATE SWALLOWING FUNCTION: CPT | Performed by: SPEECH-LANGUAGE PATHOLOGIST

## 2020-01-06 PROCEDURE — 80048 BASIC METABOLIC PNL TOTAL CA: CPT

## 2020-01-06 PROCEDURE — 97161 PT EVAL LOW COMPLEX 20 MIN: CPT

## 2020-01-06 PROCEDURE — 94640 AIRWAY INHALATION TREATMENT: CPT

## 2020-01-06 PROCEDURE — 1200000000 HC SEMI PRIVATE

## 2020-01-06 PROCEDURE — 84145 PROCALCITONIN (PCT): CPT

## 2020-01-06 PROCEDURE — 85014 HEMATOCRIT: CPT

## 2020-01-06 RX ORDER — SODIUM CHLORIDE 9 MG/ML
INJECTION, SOLUTION INTRAVENOUS CONTINUOUS
Status: ACTIVE | OUTPATIENT
Start: 2020-01-06 | End: 2020-01-06

## 2020-01-06 RX ADMIN — METOPROLOL TARTRATE 25 MG: 25 TABLET ORAL at 21:37

## 2020-01-06 RX ADMIN — NYSTATIN 500000 UNITS: 100000 SUSPENSION ORAL at 11:13

## 2020-01-06 RX ADMIN — FORMOTEROL FUMARATE DIHYDRATE 20 MCG: 20 SOLUTION RESPIRATORY (INHALATION) at 20:28

## 2020-01-06 RX ADMIN — FUROSEMIDE 20 MG: 20 TABLET ORAL at 21:01

## 2020-01-06 RX ADMIN — BUDESONIDE 1000 MCG: 0.5 SUSPENSION RESPIRATORY (INHALATION) at 10:09

## 2020-01-06 RX ADMIN — IPRATROPIUM BROMIDE AND ALBUTEROL SULFATE 1 AMPULE: .5; 3 SOLUTION RESPIRATORY (INHALATION) at 13:43

## 2020-01-06 RX ADMIN — TRAZODONE HYDROCHLORIDE 100 MG: 50 TABLET ORAL at 21:37

## 2020-01-06 RX ADMIN — FORMOTEROL FUMARATE DIHYDRATE 20 MCG: 20 SOLUTION RESPIRATORY (INHALATION) at 10:08

## 2020-01-06 RX ADMIN — IPRATROPIUM BROMIDE AND ALBUTEROL SULFATE 1 AMPULE: .5; 3 SOLUTION RESPIRATORY (INHALATION) at 20:28

## 2020-01-06 RX ADMIN — IPRATROPIUM BROMIDE AND ALBUTEROL SULFATE 1 AMPULE: .5; 3 SOLUTION RESPIRATORY (INHALATION) at 16:12

## 2020-01-06 RX ADMIN — CEFEPIME HYDROCHLORIDE 1 G: 1 INJECTION, POWDER, FOR SOLUTION INTRAMUSCULAR; INTRAVENOUS at 06:14

## 2020-01-06 RX ADMIN — METOPROLOL TARTRATE 25 MG: 25 TABLET ORAL at 11:13

## 2020-01-06 RX ADMIN — Medication 1 TABLET: at 21:01

## 2020-01-06 RX ADMIN — SODIUM CHLORIDE: 9 INJECTION, SOLUTION INTRAVENOUS at 11:27

## 2020-01-06 RX ADMIN — PETROLATUM: 42 OINTMENT TOPICAL at 21:02

## 2020-01-06 RX ADMIN — NYSTATIN 500000 UNITS: 100000 SUSPENSION ORAL at 14:51

## 2020-01-06 RX ADMIN — CEFEPIME HYDROCHLORIDE 1 G: 1 INJECTION, POWDER, FOR SOLUTION INTRAMUSCULAR; INTRAVENOUS at 21:00

## 2020-01-06 RX ADMIN — SODIUM CHLORIDE, PRESERVATIVE FREE 10 ML: 5 INJECTION INTRAVENOUS at 11:14

## 2020-01-06 RX ADMIN — MORPHINE SULFATE 15 MG: 30 TABLET ORAL at 11:13

## 2020-01-06 RX ADMIN — ISOSORBIDE MONONITRATE 60 MG: 60 TABLET, EXTENDED RELEASE ORAL at 11:13

## 2020-01-06 RX ADMIN — PANTOPRAZOLE SODIUM 40 MG: 40 TABLET, DELAYED RELEASE ORAL at 06:14

## 2020-01-06 RX ADMIN — NYSTATIN 500000 UNITS: 100000 SUSPENSION ORAL at 21:01

## 2020-01-06 RX ADMIN — APIXABAN 2.5 MG: 2.5 TABLET, FILM COATED ORAL at 11:13

## 2020-01-06 RX ADMIN — MORPHINE SULFATE 15 MG: 30 TABLET ORAL at 21:37

## 2020-01-06 RX ADMIN — IPRATROPIUM BROMIDE AND ALBUTEROL SULFATE 1 AMPULE: .5; 3 SOLUTION RESPIRATORY (INHALATION) at 10:08

## 2020-01-06 RX ADMIN — PRAVASTATIN SODIUM 40 MG: 20 TABLET ORAL at 21:37

## 2020-01-06 RX ADMIN — APIXABAN 2.5 MG: 2.5 TABLET, FILM COATED ORAL at 21:37

## 2020-01-06 RX ADMIN — MAGNESIUM GLUCONATE 500 MG ORAL TABLET 200 MG: 500 TABLET ORAL at 21:37

## 2020-01-06 RX ADMIN — LISINOPRIL 5 MG: 5 TABLET ORAL at 11:13

## 2020-01-06 RX ADMIN — BUDESONIDE 1000 MCG: 0.5 SUSPENSION RESPIRATORY (INHALATION) at 20:28

## 2020-01-06 RX ADMIN — LEVOTHYROXINE SODIUM 25 MCG: 25 TABLET ORAL at 06:14

## 2020-01-06 ASSESSMENT — PAIN SCALES - GENERAL
PAINLEVEL_OUTOF10: 9

## 2020-01-06 ASSESSMENT — PAIN DESCRIPTION - FREQUENCY: FREQUENCY: CONTINUOUS

## 2020-01-06 ASSESSMENT — PAIN DESCRIPTION - ONSET: ONSET: ON-GOING

## 2020-01-06 ASSESSMENT — PAIN DESCRIPTION - PROGRESSION: CLINICAL_PROGRESSION: NOT CHANGED

## 2020-01-06 ASSESSMENT — PAIN DESCRIPTION - ORIENTATION: ORIENTATION: RIGHT

## 2020-01-06 ASSESSMENT — PAIN - FUNCTIONAL ASSESSMENT: PAIN_FUNCTIONAL_ASSESSMENT: PREVENTS OR INTERFERES SOME ACTIVE ACTIVITIES AND ADLS

## 2020-01-06 ASSESSMENT — PAIN DESCRIPTION - LOCATION: LOCATION: SHOULDER

## 2020-01-06 ASSESSMENT — PAIN DESCRIPTION - PAIN TYPE: TYPE: CHRONIC PAIN

## 2020-01-06 ASSESSMENT — PAIN DESCRIPTION - DESCRIPTORS: DESCRIPTORS: ACHING

## 2020-01-06 NOTE — DISCHARGE INSTR - COC
Continuity of Care Form    Patient Name: Ashley Trejo   :  1928  MRN:  [id number]    Admit date:  2020  Discharge date:  20      Code Status Order: [facility]   Advance Directives:   [facility] Documentation     Date/Time Healthcare Directive Type of Healthcare Directive Copy in [address] Agent's Name Healthcare Agent's Phone Number    20 1516  No, patient does not have an advance directive for healthcare treatment  --  --  --  --  --          Admitting Physician:  Trevor Morris DO  PCP: No primary care provider on file.     Discharging Nurse: Jen Rivera RN    [address] Unit/Room#: 1400 [address] [place] [address] Unit Phone Number: [phone]    Emergency Contact:   Extended Emergency Contact Information  Primary Emergency Contact: Elvie Potts  Address: [address] [place] [address] [facility] Phone: [phone]  Work Phone: [phone]  Mobile Phone: [phone]  Relation: Child  Secondary Emergency Contact: Enid Harrison  Address: [address] [place] [address] [address] [facility] Phone: [phone]  Relation: Child    Past Surgical History:  Past Surgical History:   Procedure Laterality Date    APPENDECTOMY      BREAST SURGERY      removed tumor L breast    CARDIAC CATHETERIZATION  2014    CARDIAC PACEMAKER PLACEMENT  3/8/11    CARDIOVERSION  2012    Dr Merrick Phillips:  DCCV 1 shock 50 joules= successful    COLONOSCOPY      COLONOSCOPY  10/3/2018    COLONOSCOPY WITH BIOPSY performed by Anoop Sebastian MD at [facility]       2 stents    CORONARY ARTERY BYPASS GRAFT      ECHO COMPL W DOP COLOR FLOW  2012         ENDOSCOPY, COLON, DIAGNOSTIC      EYE SURGERY      bilat cataracts    HYSTERECTOMY      PACEMAKER PLACEMENT      [facility]    DE LAP,SURG,COLECTOMY, PARTIAL, W/ANAST N/A Assessment:  Last Vital Signs: BP (!) 154/70   Pulse 94   Temp 98.1 °F (36.7 °C) (Oral)   Resp 16   Ht 5' 2\" (1.575 m)   Wt 127 lb 8 oz (57.8 kg)   SpO2 97%   BMI 23.32 kg/m²     Last documented pain score (0-10 scale): Pain Level: 9  Last Weight:   Wt Readings from Last 1 Encounters:   01/06/20 127 lb 8 oz (57.8 kg)     Mental Status:  oriented, alert and forgetful at times    IV Access:  [address] [place] [address] IV ACCESS:[id number]}    Nursing Mobility/ADLs:  Walking   Assisted  Transfer  Assisted  Bathing  Assisted  Dressing  Assisted  Toileting  Assisted  Feeding  Independent  Med Admin  Assisted  Med Delivery   whole    Wound Care Documentation and Therapy:  Wound 12/15/19 Toe (Comment  which one) Anterior;Left; Other (Comment) (Active)   Dressing Status Clean;Dry; Intact 1/5/2020  8:30 PM   Dressing Changed Changed/New 1/5/2020  3:30 PM   Dressing/Treatment Open to air 1/6/2020 11:00 AM   Wound Cleansed Rinsed/Irrigated with saline 1/5/2020  3:30 PM   Dressing Change Due 01/06/20 1/5/2020  8:30 PM   Wound Length (cm) 1 cm 1/5/2020  3:30 PM   Wound Width (cm) 1 cm 1/5/2020  3:30 PM   Wound Surface Area (cm^2) 1 cm^2 1/5/2020  3:30 PM   Change in Wound Size % (l*w) 0 1/5/2020  3:30 PM   Wound Assessment Purple;Brown 1/6/2020 11:00 AM   Drainage Amount None 1/6/2020 11:00 AM   Eve-wound Assessment Intact 1/6/2020 11:00 AM   Number of days: 21        Elimination:  Continence:   · Bowel: Yes  · Bladder: Yes  Urinary Catheter: None   Colostomy/Ileostomy/Ileal Conduit: No       Date of Last BM: 1/8/20      Intake/Output Summary (Last 24 hours) at 1/6/2020 1447  Last data filed at 1/5/2020 1802  Gross per 24 hour   Intake --   Output 1 ml   Net -1 ml     I/O last 3 completed shifts:  In: -   Out: 1 [Urine:1]    Safety Concerns:      At Risk for Falls    Impairments/Disabilities:      Vision     Nutrition Therapy:  Current Nutrition Therapy:   - Oral Diet:  Cardiac and dysphagia soft and bite sized  - Oral Nutrition Supplement:

## 2020-01-06 NOTE — CONSULTS
CORONARY ANGIOPLASTY WITH STENT PLACEMENT  2009     2 stents    CORONARY ARTERY BYPASS GRAFT      ECHO COMPL W DOP COLOR FLOW  5/23/2012         ENDOSCOPY, COLON, DIAGNOSTIC      EYE SURGERY      bilat cataracts    HYSTERECTOMY      PACEMAKER PLACEMENT      [facility] 5/2011    [place] LAP,SURG,COLECTOMY, PARTIAL, W/ANAST N/A 10/5/2018    LAPAROSCOPIC RIGHT FRANCY COLECTOMY performed by Flores Dominguez MD at [address] [place] [address] TONSILLECTOMY         Review of Systems:   · Constitutional: As noted in the HPI. Denies fever or chills. · Eyes: No visual changes or diplopia. No scleral icterus. · ENT: No headaches, hearing loss or vertigo. No nasal congestion, or sore throat. · Cardiovascular: No chest pain, dyspnea on exertion, or palpitations. · Respiratory: See above  · Gastrointestinal: No abdominal pain, nausea or emesis. No diarrhea or rectal bleeding or melena. No change in bowel habits. · Genitourinary: No dysuria, urinary frequency, or incontinence. No hematuria. · Musculoskeletal: No gait disturbance, weakness or joint complaints. · Integumentary: No rash or pruritis. No abnormal pigmentation, hair or nail changes. · Neurological: No headache, diplopia, dizziness, tremor, change in muscle strength, numbness or tingling. No change in gait, balance, coordination, mood, affect, memory, mentation, behavior. · Psychiatric: No anxiety or depression. · Endocrine: No temperature intolerance, excessive thirst, fluid intake, urinary frequency, excessive appetite, or recent weight change. · Hematologic/Lymphatic: No abnormal bruising or bleeding, blood clots or swollen lymph nodes. No anemia, fever, chills, night sweats, or swollen glands. · Allergic/Immunologic: No seasonal or perenial allergies. No history of hives or atopic dermatitis.     Social History:  · Alcohol: No  · Tobacco:   No  · Employment:  no silica or asbestos exposure  · Family:  No family history of lung disease    Medications:   sodium chloride      sodium chloride 75 mL/hr at 20 1127      cefepime  1 g Intravenous Q12H    mineral oil-hydrophilic petrolatum   Topical BID    apixaban  2.5 mg Oral BID    budesonide  1,000 mcg Nebulization BID    formoterol  20 mcg Nebulization BID    furosemide  20 mg Oral Once per day on     isosorbide mononitrate  60 mg Oral Daily    levothyroxine  25 mcg Oral Daily    lisinopril  5 mg Oral Daily    magnesium oxide  200 mg Oral Nightly    metoprolol tartrate  25 mg Oral BID    morphine  15 mg Oral BID    therapeutic multivitamin-minerals  1 tablet Oral Dinner    nystatin  5 mL Oral 4x Daily    pravastatin  40 mg Oral Nightly    traZODone  100 mg Oral Nightly    sodium chloride flush  10 mL Intravenous 2 times per day    ipratropium-albuterol  1 ampule Inhalation Q4H WA    pantoprazole  40 mg Oral QAM AC       Vitals:  Tmax:  VITALS:  BP (!) 154/70   Pulse 94   Temp 98.1 °F (36.7 °C) (Oral)   Resp 16   Ht 5' 2\" (1.575 m)   Wt 127 lb 8 oz (57.8 kg)   SpO2 97%   BMI 23.32 kg/m²   24HR INTAKE/OUTPUT:      Intake/Output Summary (Last 24 hours) at 2020 1419  Last data filed at 2020 1802  Gross per 24 hour   Intake --   Output 1 ml   Net -1 ml     CURRENT PULSE OXIMETRY:  SpO2: 97 %  24HR PULSE OXIMETRY RANGE:  SpO2  Av.2 %  Min: 97 %  Max: 99 %    EXAM:  General: No distress. Alert. Eyes: PERRL. No sclera icterus. No conjunctival injection. ENT: No discharge. Pharynx clear. Neck: Trachea midline. Normal thyroid. No jvd, no hjr. Resp: No wheezing. No accessory muscle use. Right-sided rales. Few rhonchi. CV: Regular rate. Regular rhythm. No murmur No rub. Abd: Non-tender. Non-distended. No masses. No organmegaly. Normal bowel sounds. Skin: Warm and dry. No nodule on exposed extremities. No rash on exposed extremities. Lymph: No cervical LAD. No supraclavicular LAD. Ext: No joint deformity. No clubbing. No cyanosis. No edema  Neuro: Awake. Follows commands. Positive pupils/gag/corneals. Normal pain response. Lab Results:  CBC:   Recent Labs     01/05/20  1039 01/06/20  0315   WBC 17.3* 15.5*   HGB 7.8* 7.3*   HCT 24.3* 23.6*   MCV 95.7 97.1   * 101*       BMP:  Recent Labs     01/05/20  1039 01/06/20  0315    134   K 4.2 4.5    103   CO2 23 24   BUN 18 19   CREATININE 0.9 0.9    ALB:3,BILIDIR:3,BILITOT:3,ALKPHOS:3)@    PT/INR:   Recent Labs     01/05/20  1039   PROTIME 15.7*   INR 1.4       Cultures:  Sputum: not available  Blood: not available    ABG:   No results for input(s): PH, PO2, PCO2, HCO3, BE, O2SAT, METHB, O2HB, COHB, O2CON, HHB, THB in the last 72 hours. Films:     XR CHEST PORTABLE   Final Result   There are consolidative opacities in the right lower   lobe, concerning for pneumonia. .        Assessment:  1. Aspiration pneumonia to the superior segment of the right lower lobe as well as medial segment of right lower lobe. 2. Recent RSV pneumonia: As can be seen above, the right upper lobe infiltrate has cleared. Plan:  1. Continue cefepime for now  2. Aerosol treatments  3. Flutter valve  4. Follow-up radiographs  5. Dietary modifications      Thanks for letting us see this patient in consultation. Please contact us with any questions. Office [phone] or after hours through [place], x [phone]. Please note that voice recognition technology was used in the preparation of this note and make therefore it may contain inadvertent transcription errors    Ashley Jennings M.D., F.C.C.P.     [facility], [address] [facility], [address] [place] [address]

## 2020-01-06 NOTE — H&P
History and Physical      CHIEF COMPLAINT:  shortness of breath       HISTORY OF PRESENT ILLNESS:      The patient is a 80 y.o. female patient of Dr Nadiya Cui who presents with shortness of breath and pneumonia from a local rehab facility. She was admitted here in December with her mental status at which time she was found to have left lobe pneumonia pneumonia and her respiratory panel was positive for respiratory syncytial virus. She was noted to have diffuse bronchospasm and suspected bacterial pneumonia. She was seen by pulmonology as well as infectious disease and was treated initially with second fraction will switch to meropenem. She also has a history of atrial fibrillation and was continued on anticoagulation. She was also found to have enterococcus in the urine and was placed on linezolid  by ID. She was discharged on 12/20 back to nursing care on oral antibiotic with a prednisone taper for 5 days. She was continued on aerosols and discharged. According to her daughter she was doing well until the day of admission when she apparently had a choking episode and appeared to be short of breath and was sent to the hospital for evaluation. She currently denies any chest pain shortness of breath cough or difficulty swallowing. No fever or chills reported. When she was transferred from the skilled nursing facility paramedics noted just that her pulse oximetry was 88% on room air but did improve with oxygen by nasal cannula. Facility also reported lethargy. Patient appears to be alert and oriented at this time. Patient had a chest x-ray in the emergency room that was consistent with right lower lobe pneumonia and she also had a modest leukocytosis. She was also noted to have significant anemia. She was subsequently admitted for intravenous antibiotic therapy and further evaluation.     Past Medical History:    Past Medical History:   Diagnosis Date    [facility]fib [facility] [address] [facility]     Arthritis     [facility] [address] [facility]     Blood transfusion     Blood transfusion reaction     CAD (coronary artery disease)     Cancer (HCC)     hx skin ca nose    CHF (congestive heart failure) (HCC)     Chronic back pain     Chronic kidney disease     UTI    GERD (gastroesophageal reflux disease)     GI bleed     History of blood transfusion     Hyperlipidemia     Hypertension     Pacemaker     Pneumonia 1/5/2020    Psychiatric problem     anxiety    Syncope     recurrent with av block    Thyroid disease        Past Surgical History:    Past Surgical History:   Procedure Laterality Date    APPENDECTOMY      BREAST SURGERY      removed tumor L breast    CARDIAC CATHETERIZATION  03/13/2014    CARDIAC PACEMAKER PLACEMENT  3/8/11    CARDIOVERSION  06/28/2012    Dr Mckenzie Vance:  DCCV 1 shock 50 joules= successful    COLONOSCOPY      COLONOSCOPY  10/3/2018    COLONOSCOPY WITH BIOPSY performed by Frida Balderas MD at [place] [id number]  2009     2 stents    CORONARY ARTERY BYPASS GRAFT      ECHO COMPL W DOP COLOR FLOW  5/23/2012         ENDOSCOPY, COLON, DIAGNOSTIC      EYE SURGERY      bilat cataracts    HYSTERECTOMY      PACEMAKER PLACEMENT      [facility] 5/2011    WI LAP,SURG,COLECTOMY, PARTIAL, W/ANAST N/A 10/5/2018    LAPAROSCOPIC RIGHT FRANCY COLECTOMY performed by Carmina Noble MD at [address] [place] [address]         Medications Prior to Admission:    Medications Prior to Admission: budesonide (PULMICORT) 0.5 MG/2ML nebulizer suspension, Take 4 mLs by nebulization 2 times daily  formoterol (PERFOROMIST) 20 MCG/2ML nebulizer solution, Take 2 mLs by nebulization 2 times daily  apixaban (ELIQUIS) 2.5 MG TABS tablet, Take 1 tablet by mouth 2 times daily  miconazole (MICOTIN) 2 % powder, Apply topically 2 times daily.   nystatin (MYCOSTATIN) 067086 UNIT/ML suspension, Take 5 mLs by mouth 4 times daily  morphine (MSIR) 15 MG tablet, Take 15 mg by mouth 2 species may cause infection, antigen may not be present in   early infection, or level of antigen may be below the   detection limit. Normal Range: Presumptive Negative    Narrative:     Source: URINE       Site:              Strep Pneumoniae Antigen [[id number]]    Collected: 01/05/20 1834    Updated: 01/06/20 1113    Specimen Source: Urine, clean catch     STREP PNEUMONIAE ANTIGEN, URINE --    Presumptive NEGATIVE for Pneumococcal pneumonia, suggesting   no current or recent pneumococcal infection. Infection due   to S. pneumoniae cannot be ruled out since the antigen present   in the sample may be below the detection limit of the test.    Narrative:     Source: URINE       Site:              Procalcitonin [[id number]] (Abnormal)    Collected: 01/06/20 0315    Updated: 01/06/20 8312    Specimen Type: Blood     Procalcitonin 0. 76High  ng/mL   Basic Metabolic Panel w/ Reflex to MG [[id number]] (Abnormal)    Collected: 01/06/20 0315    Updated: 01/06/20 0405    Specimen Source: Blood     Sodium 134 mmol/L    Potassium reflex Magnesium 4.5 mmol/L    Chloride 103 mmol/L    CO2 24 mmol/L    Anion Gap 7 mmol/L    Glucose 99 mg/dL    BUN 19 mg/dL    CREATININE 0.9 mg/dL    GFR Non-African American 59 mL/min/1.73    Comment: Chronic Kidney Disease: less than 60 ml/min/1.73 sq. m.         Kidney Failure: less than 15 ml/min/1.73 sq.m. Results valid for patients 18 years and older. GFR  >60    Calcium 8.5Low  mg/dL   CBC [[id number]] (Abnormal)    Collected: 01/06/20 0315    Updated: 01/06/20 0329    Specimen Source: Blood     WBC 15. 5High  E9/L    RBC 2.43Low  E12/L    Hemoglobin 7.3Low  g/dL    Hematocrit 23.6Low  %    MCV 97.1 fL    MCH 30.0 pg    MCHC 30.9Low  %    RDW 15.0 fL    Platelets 287XYD  R0/C    MPV 9.9 fL   EKG REPORT [[id number]]    Resulted: 01/05/20 1112    Updated: 01/05/20 2131    EKG 12 Lead [[id number]]    Collected: 01/05/20 1112    Updated: 01/05/20 2130     Ventricular Rate 84 BPM    Atrial Rate 97 BPM    QRS Duration 96 ms    Q-T Interval 388 ms    QTc Calculation (Bazett) 458 ms    R Axis 67 degrees    T Axis -12 degrees   Narrative:     Atrial fibrillation with occasional ventricular-paced complexes  Low voltage QRS  Incomplete right bundle branch block  Abnormal QRS-T angle, consider primary T wave abnormality  Abnormal ECG  When compared with ECG of 25-SEP-2018 16:08,  Electronic ventricular pacemaker has replaced Atrial fibrillation  Confirmed by Kenia Wise ([id number]) on 1/5/2020 9:30:49 PM   Respiratory Panel, Molecular [[id number]] (Abnormal)    Collected: 01/05/20 1039    Updated: 01/05/20 2113    Specimen Source: Nasopharyngeal     Adenovirus by PCR Not Detected    Bordetella parapertussis by PCR Not Detected    Bordetella pertussis by PCR Not Detected    Chlamydophilia pneumoniae by PCR Not Detected    Coronavirus 229E by PCR Not Detected    Coronavirus HKU1 by PCR Not Detected    Coronavirus NL63 by PCR Not Detected    Coronavirus OC43 by PCR Not Detected    Human Metapneumovirus by PCR Not Detected    Human Rhinovirus/Enterovirus by PCR Not Detected    Influenza A by PCR Not Detected    Influenza B by PCR Not Detected    Mycoplasma pneumoniae by PCR Not Detected    Parainfluenza Virus 1 by PCR Not Detected    Parainfluenza Virus 2 by PCR Not Detected    Parainfluenza Virus 3 by PCR Not Detected    Parainfluenza Virus 4 by PCR Not Detected    Respiratory Syncytial Virus by PCR DETECTEDAbnormal    MRSA SCREENING CULTURE ONLY [[id number]]    Collected: 01/05/20 1652    Updated: 01/05/20 1958    Specimen Source: Nares    Culture Blood #1 [[id number]]    Collected: 01/05/20 1045    Updated: 01/05/20 1623    Specimen Source: Blood    Culture Blood #2 [[id number]]    Collected: 01/05/20 1427    Updated: 01/05/20 1623    Specimen Source: Blood      Results for orders placed or performed during the hospital encounter of 01/05/20   CBC Auto Differential   Result Value Ref Range     WBC appears mildly atretic. The right kidney shows slight dilation of the collecting system without collecting system calcification, and the right ureter appears slightly more distended proximally. The bladder is distended and thin-walled with no intraluminal debris. APPENDIX:  Surgically absent BOWEL:  There is no evidence of inflammation, obstruction, or perforation of enteric structures. Surgical anastomotic staple lines are noted in the right lower abdomen, and appear uncomplicated. The distal left hemicolon is carpeted with diverticuli, but no acute inflammatory findings are noted. PELVIS:  There is no pelvic adenopathy or dependent free pelvic fluid. [place] LYMPHATICS:There is no mesenteric or retroperitoneal adenopathy. VASCULAR: There is no evidence of acute vascular pathology involving mesenteric or retroperitoneal great vessels. Prominent atherosclerotic aortoiliac calcified mural plaque is present. SKELETAL: There is severe degenerative change of the spine with a prominent levo levorotatory scoliotic curvature, the vertical axis of the lower thoracic spine lying about 3 cm to the right of the lumbosacral junction vertical access. The apex of the scoliotic curvature of the lumbar spine is at L3. There is some diffuse osteopenia.      1. Subtle interstitial density in the left lower lung could represent early pneumonia or asymmetric focal edema. 2. There is asymmetric dilation of the intrarenal collecting system and proximal ureter on the right side, but the distal ureters not well seen in the pelvis, and no calcifications are noted in either kidney. The left kidney shows no evidence of obstructive dilation. The distended bladder is unremarkable. 3. Diverticulosis coli of the pelvic colon without acute inflammatory findings. The patient is post partial colectomy with uncomplicated anastomotic staple lines in the right lower abdomen.  4. Severe scoliotic and degenerative changes of the lumbar spine levoconvex with the apex at L3. 5. Extensive atherosclerotic calcification is present. There is cardiomegaly with pacemaker wires and no evidence of acute decompensation.      Xr Chest Standard (2 Vw)     Result Date: 2019  Patient MRN: [id number] : 1928 Age:  80 years Gender: Female Order Date: 2019 12:00 PM Exam: XR CHEST (2 VW) Number of Images: 2 views Indication:   lung infiltrate lung infiltrate Comparison: 2019 chest x-ray and 2019 and CT of the chest Findings: There is a left-sided dual-lead pacemaker present. The heart is enlarged. Lung fields demonstrate patchy bilateral infiltrates which could be related to pneumonia or edema. There are bilateral pleural effusions left greater than right. The aorta is tortuous and ectatic      Cardiomegaly. Tortuous aorta. There are patchy infiltrates seen throughout both the lung fields. Pneumonia could give this appearance. Underlying edema could also have this appearance. Bilateral pleural effusions left greater than right. The chest does not appear to be significantly changed in the interval. The exam has been dictated and signed by Aleena Gar. PRISCILLA Horvath-LUIS and Benitez Green MD, reviewed and concurred with these findings.     Xr Chest Standard (2 Vw)     Result Date: 2019  Patient MRN: [id number] : 1928 Age:  80 years Gender: Female Order Date: 2019 12:00 PM Exam: XR CHEST (2 VW) Number of Images: 2 views Indication:   Congestion possible aspiration Comparison: 12/15/2019. Findings: There is a left sided pacemaker with 2 electrodes, mid sternotomy wires, and multiple surgical clips in the left hemithorax. The heart is enlarged. The lung fields are patchy infiltrates in the right upper lung field and left lower lung field. The aorta is tortuous and calcified.      Cardiomegaly. Patchy infiltrates in the right upper and left lower lung which could be related to pneumonia. Atherosclerotic disease of the aorta.  The chest appears to be worse in the interval The study was dictated by Eugenie Pabon PA-C and Ric Lauren MD reviewed and concurred with the findings.     Ct Chest Wo Contrast     Result Date: 2019  Patient Name:  Mallory Childress Patient MRN:  [id number] Patient :  1928 Patient Age:  80 years Patient Gender:  Female Order Date:2019 2:30 PM EXAM:  CT CHEST WO CONTRAST NUMBER OF IMAGES:  278. INDICATION:   lung mass / infiltrate COMPARISON: May 5, 2014. Technique: Low-dose CT  acquisition technique included one of following options; 1 . Automated exposure control, 2. Adjustment of MA and or KV according to patient's size or 3. Use of iterative reconstruction. Multiple axial images were obtained from the apices of the lungs through the lung bases. Sagittal and coronal reconstructions performed for aid in interpretation of the study. FINDINGS: Postoperative changes of median sternotomy with left chest cardiac pacemaker and dual leads extending into the cardiomediastinum. The trachea is midline and patent. The thyroid gland is unremarkable. Right upper lobe groundglass opacities with patchy areas of consolidation. Small right greater than left pleural effusions. Left lower lobe opacities, likely represents subsegmental atelectasis. A more focal ovoid soft tissue density is seen along the medial aspect of the left lower lobe measuring approximately 2.6 x 2.1 x 2.6 cm, poorly visualized secondary to motion artifact (axial image #44). Allowing for non-gating the heart demonstrates multichamber cardiac enlargement. Calcific aortic atherosclerotic disease. Exaggerated thoracic kyphosis with multilevel chronic appearing anterior wedge deformities of mid thoracic vertebral bodies. Multiple chronic appearing right-sided rib fractures. The visualized abdomen is unremarkable.      1. Patchy groundglass opacities with consolidation of the right upper lobe, concerning for pneumonia.  2. Small right greater than left pleural Beto Alonzo., [place]  12:17 PM  1/6/2020

## 2020-01-06 NOTE — PROGRESS NOTES
prognosis, and plan of care. PLAN  PT care will be provided in accordance with the objectives noted above. Whenever appropriate, clear delegation orders will be provided for nursing staff. Exercises and functional mobility practice will be used as well as appropriate assistive devices or modalities to obtain goals. Patient and family education will also be administered as needed. Frequency of treatments will be 2-5x/week x 3-5 days. Time in: 09:30  Time out: 09:50    Gurinder Calvo., P.T.    License number:  [id number]

## 2020-01-06 NOTE — CARE COORDINATION
Social Work discharge planning   Pt is from [facility], where she was skilled prior to this admission. Per SNF liaison Aurora Jeo, pt is a Medicaid bed hold and does NOT need precert to return snf skilled under her Medicare. Sw went to meet with pt, who was coming out of the bathroom with aide, and appeared pleasantly confused. Sw called pt's dtr Alona Beckford [phone] and she advised that pt's plan is to return to [facility], where pt lives as a resident. N17 started. Ambulance forms and envelope ready in pt's folder for return at discharge. Social Work to follow for support and discharge planning with RN CM.   Electronically signed by Rafat Gardner on 1/6/2020 at 2:45 PM

## 2020-01-06 NOTE — PROGRESS NOTES
will be re-evaluated and revised in 1 week  if warranted. , Patient stated goals: Agreed with above, Treatment goals discussed with Patient and Family, The Patient and Family understand the diagnosis, prognosis and plan of care. Evaluation time includes  review of current medical information, gathering information on past medical history/social history and prior level of function, completion of standardized testing/informal observation of tasks, assessment of data, and development of POC/Goals. INTERVENTION/EDUCATION    Pt educated on above results and plan of care. Pt trained on compensatory strategies for safe swallow with good outcome. Pt encouraged to engage in question/answer session. All questions answered and pt verbalized understanding of above. [x]The admitting diagnosis and active problem list, as listed below have been reviewed prior to initiation of this evaluation.      ADMITTING DIAGNOSIS: Pneumonia [J18.9]  Pneumonia [J18.9]     ACTIVE PROBLEM LIST:   Patient Active Problem List   Diagnosis    Generalized abdominal pain    Atrial fibrillation ([facility] [address]    CAD (coronary artery disease)    Pacemaker    Chronic anticoagulation    Atrial tachycardia (HCC)    Chest pain    Valvular heart disease    Acute systolic CHF (congestive heart failure) (HCC)    Coagulopathy (HCC)    Terminal ileitis with rectal bleeding (HCC)    BRBPR (bright red blood per rectum)    Diarrhea    Nausea and vomiting    Supratherapeutic PT INR    Essential hypertension    Chronic diastolic CHF (congestive heart failure) (HCC)    CKD (chronic kidney disease)    Hyperlipidemia    Hypothyroidism    Abnormal chest sounds - suspect LLL pneumonia    Moderate protein-calorie malnutrition (HCC)    Acidosis    Anemia associated with acute blood loss    Cancer of ascending colon (HCC)    UTI (urinary tract infection)    Dyspnea    Palliative care by specialist    Goals of care, counseling/discussion   

## 2020-01-07 LAB
ALBUMIN SERPL-MCNC: 3.3 G/DL (ref 3.5–5.2)
ALP BLD-CCNC: 119 U/L (ref 35–104)
ALT SERPL-CCNC: 13 U/L (ref 0–32)
ANION GAP SERPL CALCULATED.3IONS-SCNC: 12 MMOL/L (ref 7–16)
AST SERPL-CCNC: 13 U/L (ref 0–31)
BASOPHILS ABSOLUTE: 0.03 E9/L (ref 0–0.2)
BASOPHILS RELATIVE PERCENT: 0.3 % (ref 0–2)
BILIRUB SERPL-MCNC: 0.7 MG/DL (ref 0–1.2)
BUN BLDV-MCNC: 16 MG/DL (ref 8–23)
CALCIUM SERPL-MCNC: 8.7 MG/DL (ref 8.6–10.2)
CEA: 5.5 NG/ML (ref 0–5.2)
CHLORIDE BLD-SCNC: 102 MMOL/L (ref 98–107)
CO2: 21 MMOL/L (ref 22–29)
CREAT SERPL-MCNC: 0.8 MG/DL (ref 0.5–1)
EOSINOPHILS ABSOLUTE: 0.23 E9/L (ref 0.05–0.5)
EOSINOPHILS RELATIVE PERCENT: 2.2 % (ref 0–6)
FERRITIN: 322 NG/ML
GFR AFRICAN AMERICAN: >60
GFR NON-AFRICAN AMERICAN: >60 ML/MIN/1.73
GLUCOSE BLD-MCNC: 137 MG/DL (ref 74–99)
HCT VFR BLD CALC: 26.5 % (ref 34–48)
HEMOGLOBIN: 8.4 G/DL (ref 11.5–15.5)
IMMATURE GRANULOCYTES #: 0.12 E9/L
IMMATURE GRANULOCYTES %: 1.1 % (ref 0–5)
IRON SATURATION: 20 % (ref 15–50)
IRON: 27 MCG/DL (ref 37–145)
LYMPHOCYTES ABSOLUTE: 0.7 E9/L (ref 1.5–4)
LYMPHOCYTES RELATIVE PERCENT: 6.6 % (ref 20–42)
MCH RBC QN AUTO: 30.3 PG (ref 26–35)
MCHC RBC AUTO-ENTMCNC: 31.7 % (ref 32–34.5)
MCV RBC AUTO: 95.7 FL (ref 80–99.9)
MONOCYTES ABSOLUTE: 0.76 E9/L (ref 0.1–0.95)
MONOCYTES RELATIVE PERCENT: 7.2 % (ref 2–12)
MRSA CULTURE ONLY: NORMAL
NEUTROPHILS ABSOLUTE: 8.7 E9/L (ref 1.8–7.3)
NEUTROPHILS RELATIVE PERCENT: 82.6 % (ref 43–80)
PATHOLOGIST REVIEW: NORMAL
PDW BLD-RTO: 15.5 FL (ref 11.5–15)
PLATELET # BLD: 113 E9/L (ref 130–450)
PMV BLD AUTO: 10.3 FL (ref 7–12)
POTASSIUM SERPL-SCNC: 4 MMOL/L (ref 3.5–5)
RBC # BLD: 2.77 E12/L (ref 3.5–5.5)
SODIUM BLD-SCNC: 135 MMOL/L (ref 132–146)
TOTAL IRON BINDING CAPACITY: 134 MCG/DL (ref 250–450)
TOTAL PROTEIN: 5.8 G/DL (ref 6.4–8.3)
WBC # BLD: 10.5 E9/L (ref 4.5–11.5)

## 2020-01-07 PROCEDURE — 36415 COLL VENOUS BLD VENIPUNCTURE: CPT

## 2020-01-07 PROCEDURE — 2700000000 HC OXYGEN THERAPY PER DAY

## 2020-01-07 PROCEDURE — 82378 CARCINOEMBRYONIC ANTIGEN: CPT

## 2020-01-07 PROCEDURE — 97165 OT EVAL LOW COMPLEX 30 MIN: CPT

## 2020-01-07 PROCEDURE — 2580000003 HC RX 258: Performed by: INTERNAL MEDICINE

## 2020-01-07 PROCEDURE — 1200000000 HC SEMI PRIVATE

## 2020-01-07 PROCEDURE — 92526 ORAL FUNCTION THERAPY: CPT | Performed by: SPEECH-LANGUAGE PATHOLOGIST

## 2020-01-07 PROCEDURE — 85025 COMPLETE CBC W/AUTO DIFF WBC: CPT

## 2020-01-07 PROCEDURE — 6360000002 HC RX W HCPCS: Performed by: INTERNAL MEDICINE

## 2020-01-07 PROCEDURE — 6370000000 HC RX 637 (ALT 250 FOR IP): Performed by: INTERNAL MEDICINE

## 2020-01-07 PROCEDURE — 83550 IRON BINDING TEST: CPT

## 2020-01-07 PROCEDURE — 80053 COMPREHEN METABOLIC PANEL: CPT

## 2020-01-07 PROCEDURE — 82728 ASSAY OF FERRITIN: CPT

## 2020-01-07 PROCEDURE — 83540 ASSAY OF IRON: CPT

## 2020-01-07 PROCEDURE — 94640 AIRWAY INHALATION TREATMENT: CPT

## 2020-01-07 RX ADMIN — FORMOTEROL FUMARATE DIHYDRATE 20 MCG: 20 SOLUTION RESPIRATORY (INHALATION) at 09:10

## 2020-01-07 RX ADMIN — APIXABAN 2.5 MG: 2.5 TABLET, FILM COATED ORAL at 08:06

## 2020-01-07 RX ADMIN — SODIUM CHLORIDE: 9 INJECTION, SOLUTION INTRAVENOUS at 00:51

## 2020-01-07 RX ADMIN — MORPHINE SULFATE 15 MG: 30 TABLET ORAL at 22:05

## 2020-01-07 RX ADMIN — Medication 1 TABLET: at 16:03

## 2020-01-07 RX ADMIN — LISINOPRIL 5 MG: 5 TABLET ORAL at 08:06

## 2020-01-07 RX ADMIN — PETROLATUM: 42 OINTMENT TOPICAL at 08:06

## 2020-01-07 RX ADMIN — BUDESONIDE 1000 MCG: 0.5 SUSPENSION RESPIRATORY (INHALATION) at 09:13

## 2020-01-07 RX ADMIN — MORPHINE SULFATE 15 MG: 30 TABLET ORAL at 08:06

## 2020-01-07 RX ADMIN — PRAVASTATIN SODIUM 40 MG: 20 TABLET ORAL at 22:05

## 2020-01-07 RX ADMIN — IPRATROPIUM BROMIDE AND ALBUTEROL SULFATE 1 AMPULE: .5; 3 SOLUTION RESPIRATORY (INHALATION) at 19:23

## 2020-01-07 RX ADMIN — TRAZODONE HYDROCHLORIDE 100 MG: 50 TABLET ORAL at 22:05

## 2020-01-07 RX ADMIN — BUDESONIDE 1000 MCG: 0.5 SUSPENSION RESPIRATORY (INHALATION) at 19:23

## 2020-01-07 RX ADMIN — IPRATROPIUM BROMIDE AND ALBUTEROL SULFATE 1 AMPULE: .5; 3 SOLUTION RESPIRATORY (INHALATION) at 16:09

## 2020-01-07 RX ADMIN — NYSTATIN 500000 UNITS: 100000 SUSPENSION ORAL at 22:04

## 2020-01-07 RX ADMIN — CEFEPIME HYDROCHLORIDE 1 G: 1 INJECTION, POWDER, FOR SOLUTION INTRAMUSCULAR; INTRAVENOUS at 22:09

## 2020-01-07 RX ADMIN — METOPROLOL TARTRATE 25 MG: 25 TABLET ORAL at 08:06

## 2020-01-07 RX ADMIN — IPRATROPIUM BROMIDE AND ALBUTEROL SULFATE 1 AMPULE: .5; 3 SOLUTION RESPIRATORY (INHALATION) at 12:37

## 2020-01-07 RX ADMIN — NYSTATIN 500000 UNITS: 100000 SUSPENSION ORAL at 16:03

## 2020-01-07 RX ADMIN — METOPROLOL TARTRATE 25 MG: 25 TABLET ORAL at 22:05

## 2020-01-07 RX ADMIN — ISOSORBIDE MONONITRATE 60 MG: 60 TABLET, EXTENDED RELEASE ORAL at 08:06

## 2020-01-07 RX ADMIN — NYSTATIN 500000 UNITS: 100000 SUSPENSION ORAL at 12:35

## 2020-01-07 RX ADMIN — IPRATROPIUM BROMIDE AND ALBUTEROL SULFATE 1 AMPULE: .5; 3 SOLUTION RESPIRATORY (INHALATION) at 09:12

## 2020-01-07 RX ADMIN — MAGNESIUM GLUCONATE 500 MG ORAL TABLET 200 MG: 500 TABLET ORAL at 22:05

## 2020-01-07 RX ADMIN — PETROLATUM: 42 OINTMENT TOPICAL at 22:10

## 2020-01-07 RX ADMIN — NYSTATIN 500000 UNITS: 100000 SUSPENSION ORAL at 08:06

## 2020-01-07 RX ADMIN — CEFEPIME HYDROCHLORIDE 1 G: 1 INJECTION, POWDER, FOR SOLUTION INTRAMUSCULAR; INTRAVENOUS at 08:11

## 2020-01-07 RX ADMIN — FORMOTEROL FUMARATE DIHYDRATE 20 MCG: 20 SOLUTION RESPIRATORY (INHALATION) at 19:23

## 2020-01-07 RX ADMIN — PANTOPRAZOLE SODIUM 40 MG: 40 TABLET, DELAYED RELEASE ORAL at 06:54

## 2020-01-07 RX ADMIN — APIXABAN 2.5 MG: 2.5 TABLET, FILM COATED ORAL at 22:05

## 2020-01-07 RX ADMIN — LEVOTHYROXINE SODIUM 25 MCG: 25 TABLET ORAL at 06:54

## 2020-01-07 RX ADMIN — DOCUSATE SODIUM 100 MG: 100 CAPSULE, LIQUID FILLED ORAL at 08:06

## 2020-01-07 ASSESSMENT — PAIN DESCRIPTION - ONSET: ONSET: PROGRESSIVE

## 2020-01-07 ASSESSMENT — PAIN DESCRIPTION - FREQUENCY: FREQUENCY: INTERMITTENT

## 2020-01-07 ASSESSMENT — PAIN SCALES - GENERAL
PAINLEVEL_OUTOF10: 0
PAINLEVEL_OUTOF10: 4

## 2020-01-07 ASSESSMENT — PAIN - FUNCTIONAL ASSESSMENT: PAIN_FUNCTIONAL_ASSESSMENT: ACTIVITIES ARE NOT PREVENTED

## 2020-01-07 ASSESSMENT — PAIN DESCRIPTION - DESCRIPTORS: DESCRIPTORS: ACHING;DISCOMFORT;SORE

## 2020-01-07 ASSESSMENT — PAIN DESCRIPTION - PAIN TYPE: TYPE: CHRONIC PAIN

## 2020-01-07 ASSESSMENT — PAIN DESCRIPTION - LOCATION: LOCATION: GENERALIZED

## 2020-01-07 ASSESSMENT — PAIN DESCRIPTION - PROGRESSION: CLINICAL_PROGRESSION: GRADUALLY WORSENING

## 2020-01-07 NOTE — PROGRESS NOTES
Occupational Therapy  OCCUPATIONAL THERAPY INITIAL EVALUATION      Date:2020  Patient Name: Arpit Bales  MRN: [id number]  : 1928  Room: [address] [place], [place] [address]    Evaluating OT: Ochoa Morin - [id number]    AM-PAC Daily Activity Raw Score: 15/24      Recommended Adaptive Equipment: Continue to assess. Diagnosis: Pneumonia [J18.9]    Pertinent Medical History: a-fib, cancer, CHF, CKD, HTN, pacemaker, anxiety, chronic back pain, arthritis      Precautions: falls, chair alarm, contact and droplet isolation (RSV), O2 via nasal cannula    Home Living: Patient is a resident of [facility], per review of patient's medical record. Prior Level of Function (PLOF): Per patient, she was mostly independent with ADLs, needed assistance with IADLs, and needed assistance with functional mobility (with walker) prior to this hospitalization. Patient is a questionable historian; no family/caregiver present to verify information gathered. Pain Level: Patient denied experiencing pain. Cognition: Patient alert and oriented to person only; confusion demonstrated. Fair command follow demonstrated. Memory: Impaired   Sequencing: Fair   Problem Solving: Fair   Judgement/Safety: Fair    Functional Assessment:   Initial Eval Status  Date: 2020 Treatment Status  Date:  Short Term Goals  Treatment Frequency: PRN   Feeding SBA  Setup   Grooming Min A  SBA  (seated/standing at sink)   UB Dressing Min A  Setup   LB Dressing Max A  Min A - with use of AE, as needed/appropriate   Bathing Max A  Min A - with use of AE/DME, as needed/appropriate   Toileting Max A  Min A   Bed Mobility  Not assessed. Functional Transfers Sit-to-Stand:  Mod A   from bedside chair  SBA   Functional Mobility Min A   (with walker) for short distance within patient's room  Supervision - with use of device, as needed/appropriate   Balance Sitting: Good  (at edge of chair)  Standing: Fair-  (with walker)  Fair+ dynamic standing balance during

## 2020-01-07 NOTE — PROGRESS NOTES
SPEECH LANGUAGE PATHOLOGY  DAILY PROGRESS NOTE            SWALLOWING    Pt upright in chair, reports good toleration of current diet. Pt accepted 6 oz thin liquid per cup during session. DYSPHAGIA DIAGNOSIS:  Oropharyngeal dysphagia-clinical s/s of aspiration observed with thin liquids per straw on eval; cough X1 with increased sips size per cup during today's session. DIET RECOMMENDATIONS:  Dysphagia 3, Soft/advanced (Soft & Bite-sized) solids with small sips thin liquids-NO STRAWS                 FEEDING RECOMMENDATIONS:                           Assistance level:  No assistance needed                             Compensatory strategies recommended: Small bites/sips and No straw         Oral- adequate labial seal and A-P transfer, no oral residue      Pharyngeal- cough X1 only on 6 oz thin liquids per cup-silent aspiration can not be r/o bedside, no multiple swallows      Education- Pt educated on results and POC. Pt trained on compensatory strategies for safe swallow with good outcome. Questions answered. Will continue SP intervention as per previously established POC. Jaron CORTES. CCC/SLP [id number]  Speech Pathologist

## 2020-01-07 NOTE — PATIENT CARE CONFERENCE
[facility] Quality Flow/Interdisciplinary Rounds Progress Note        Quality Flow Rounds held on January 7, 2020    Disciplines Attending:  Bedside Nurse, ,  and Nursing Unit Leadership    Arpit Bales was admitted on 1/5/2020 10:26 AM    Anticipated Discharge Date:  Expected Discharge Date: 01/09/20    Disposition:    Tima Score:  Tima Scale Score: 14    Readmission Risk              Risk of Unplanned Readmission:        23           Discussed patient goal for the day, patient clinical progression, and barriers to discharge.   The following Goal(s) of the Day/Commitment(s) have been identified:  Diagnostics - Report Results and Labs - Report Results      Amita Faria  January 7, 2020

## 2020-01-07 NOTE — PROGRESS NOTES
formoterol  20 mcg Nebulization BID    furosemide  20 mg Oral Once per day on Mon Wed Fri    isosorbide mononitrate  60 mg Oral Daily    levothyroxine  25 mcg Oral Daily    lisinopril  5 mg Oral Daily    magnesium oxide  200 mg Oral Nightly    metoprolol tartrate  25 mg Oral BID    morphine  15 mg Oral BID    therapeutic multivitamin-minerals  1 tablet Oral Dinner    nystatin  5 mL Oral 4x Daily    pravastatin  40 mg Oral Nightly    traZODone  100 mg Oral Nightly    sodium chloride flush  10 mL Intravenous 2 times per day    ipratropium-albuterol  1 ampule Inhalation Q4H WA    pantoprazole  40 mg Oral QAM AC       Diet:   DIET CARDIAC; Dysphagia Soft and Bite-Sized; No Drinking Straw     EXAM:  General: No distress. Alert. Eyes: PERRL. No sclera icterus. No conjunctival injection. ENT: No discharge. Pharynx clear. Neck: Trachea midline. Normal thyroid. Resp: No accessory muscle use. Right-sided rales. No wheezing. Few rhonchi. CV: Irregular rate. Irregular rhythm. 3/6 SE murmur  Abd: Non-tender. Non-distended. No masses. No organomegaly. Normal bowel sounds. Skin: Warm and dry. No nodule on exposed extremities. No rash on exposed extremities. Ext: No cyanosis, clubbing, edema  Lymph: No cervical LAD. No supraclavicular LAD. M/S: No cyanosis. No joint deformity. No clubbing. Neuro: Awake. Follows commands. Positive pupils/gag/corneals. Normal pain response.        Results:  CBC:   Recent Labs     01/05/20  1039 01/06/20  0315 01/06/20  1815 01/07/20  0400   WBC 17.3* 15.5*  --  10.5   HGB 7.8* 7.3* 8.1* 8.4*   HCT 24.3* 23.6* 25.3* 26.5*   MCV 95.7 97.1  --  95.7   * 101*  --  113*     BMP:   Recent Labs     01/05/20  1039 01/06/20  0315 01/07/20  0400    134 135   K 4.2 4.5 4.0    103 102   CO2 23 24 21*   BUN 18 19 16   CREATININE 0.9 0.9 0.8     LIVER PROFILE:   Recent Labs     01/05/20  1039 01/07/20  0400   AST 15 13   ALT 14 13   BILITOT 0.4 0.7   ALKPHOS 80 119*

## 2020-01-07 NOTE — PROGRESS NOTES
Subjective:    Awake and alert. No problems overnight. Denies chest pain or dyspnea. Denies abdominal pain. Tolerating diet. No nausea or vomiting. Objective:    BP (!) 172/68   Pulse 93   Temp 97.8 °F (36.6 °C) (Oral)   Resp 16   Ht 5' 2\" (1.575 m)   Wt 132 lb 11.2 oz (60.2 kg)   SpO2 94%   BMI 24.27 kg/m²   Skin: Warm and dry  Neck: Supple, no JVD  Heart:  RRR, no murmurs, gallops, or rubs. Lungs:  Rales right lower lobe   Abd: bowel sounds present, nontender, nondistended, no masses  Extrem:  No clubbing, cyanosis, or edema, pulses intact    I/O last 3 completed shifts: In: 2172 [P.O.:180; I.V.:1992]  Out: 6454 [Urine:1550]    Laboratory:     CBC with Differential:    Lab Results   Component Value Date    WBC 10.5 01/07/2020    RBC 2.77 01/07/2020    HGB 8.4 01/07/2020    HCT 26.5 01/07/2020     01/07/2020    MCV 95.7 01/07/2020    MCH 30.3 01/07/2020    MCHC 31.7 01/07/2020    RDW 15.5 01/07/2020    NRBC 0.0 12/18/2019    SEGSPCT 59 03/14/2014    METASPCT 1.0 09/26/2018    LYMPHOPCT 6.6 01/07/2020    MONOPCT 7.2 01/07/2020    BASOPCT 0.3 01/07/2020    MONOSABS 0.76 01/07/2020    LYMPHSABS 0.70 01/07/2020    EOSABS 0.23 01/07/2020    BASOSABS 0.03 01/07/2020     CMP:    Lab Results   Component Value Date     01/07/2020    K 4.0 01/07/2020    K 4.5 01/06/2020     01/07/2020    CO2 21 01/07/2020    BUN 16 01/07/2020    CREATININE 0.8 01/07/2020    GFRAA >60 01/07/2020    LABGLOM >60 01/07/2020    GLUCOSE 137 01/07/2020    GLUCOSE 118 05/25/2012    PROT 5.8 01/07/2020    LABALBU 3.3 01/07/2020    LABALBU 3.6 05/25/2012    CALCIUM 8.7 01/07/2020    BILITOT 0.7 01/07/2020    ALKPHOS 119 01/07/2020    AST 13 01/07/2020    ALT 13 01/07/2020        XR CHEST PORTABLE   Final Result   There are consolidative opacities in the right lower   lobe, concerning for pneumonia. Prior to Admission medications    Medication Sig Start Date End Date Taking?  Authorizing mg  15 mg Oral BID Kishan Jaeger MD   15 mg at 01/07/20 5711    therapeutic multivitamin-minerals 1 tablet  1 tablet Oral Dinner Kishan Jaeger MD   1 tablet at 01/06/20 2101    nystatin (MYCOSTATIN) 271161 UNIT/ML suspension 500,000 Units  5 mL Oral 4x Daily Kishan Jaeger MD   500,000 Units at 01/07/20 1235    pravastatin (PRAVACHOL) tablet 40 mg  40 mg Oral Nightly Kishan Jaeger MD   40 mg at 01/06/20 2137    traZODone (DESYREL) tablet 100 mg  100 mg Oral Nightly Kishan Jaeger MD   100 mg at 01/06/20 2137    0.9 % sodium chloride infusion   Intravenous Continuous Kishan Jaeger MD 75 mL/hr at 01/07/20 0051      sodium chloride flush 0.9 % injection 10 mL  10 mL Intravenous 2 times per day Kishan Jaeger MD   10 mL at 01/06/20 1114    sodium chloride flush 0.9 % injection 10 mL  10 mL Intravenous PRN Kishan Jaeger MD        magnesium hydroxide (MILK OF MAGNESIA) 400 MG/5ML suspension 30 mL  30 mL Oral Daily PRN Kishan Jaeger MD        ondansetron [facility]) injection 4 mg  4 mg Intravenous Q6H PRN Kishan Jaeger MD        ipratropium-albuterol (DUONEB) nebulizer solution 1 ampule  1 ampule Inhalation Q4H WA Kishan Jaeger MD   1 ampule at 01/07/20 1237    acetaminophen (TYLENOL) tablet 650 mg  650 mg Oral Q4H PRN Kishan Jaeger MD        pantoprazole (PROTONIX) tablet 40 mg  40 mg Oral QAM AC Kishan Jaeger MD   40 mg at 01/07/20 3651           Problem list:    Active Problems:    Pneumonia  Resolved Problems:    * No resolved hospital problems.  *      Assessment:    Bacterial pneumonia right lower lobe, possible aspiration     Recent RSV tracheobronchitis     Permanent atrial fibrillation     Normocytic anemia significant suggestive of chronic disease     Moderate protein calorie malnutrition     Colon carcinoma status post right hemicolectomy 10/6/2018     CAD status post CABG     History of GI bleed-remote     Status post permanent

## 2020-01-08 ENCOUNTER — APPOINTMENT (OUTPATIENT)
Dept: GENERAL RADIOLOGY | Age: 85
DRG: 871 | End: 2020-01-08
Payer: MEDICARE

## 2020-01-08 PROCEDURE — 1200000000 HC SEMI PRIVATE

## 2020-01-08 PROCEDURE — 94640 AIRWAY INHALATION TREATMENT: CPT

## 2020-01-08 PROCEDURE — 71045 X-RAY EXAM CHEST 1 VIEW: CPT

## 2020-01-08 PROCEDURE — 6370000000 HC RX 637 (ALT 250 FOR IP): Performed by: INTERNAL MEDICINE

## 2020-01-08 PROCEDURE — 2580000003 HC RX 258: Performed by: INTERNAL MEDICINE

## 2020-01-08 PROCEDURE — 6360000002 HC RX W HCPCS: Performed by: INTERNAL MEDICINE

## 2020-01-08 PROCEDURE — 87205 SMEAR GRAM STAIN: CPT

## 2020-01-08 PROCEDURE — 92526 ORAL FUNCTION THERAPY: CPT | Performed by: SPEECH-LANGUAGE PATHOLOGIST

## 2020-01-08 PROCEDURE — 2700000000 HC OXYGEN THERAPY PER DAY

## 2020-01-08 RX ADMIN — NYSTATIN 500000 UNITS: 100000 SUSPENSION ORAL at 08:30

## 2020-01-08 RX ADMIN — DOCUSATE SODIUM 100 MG: 100 CAPSULE, LIQUID FILLED ORAL at 08:30

## 2020-01-08 RX ADMIN — METOPROLOL TARTRATE 25 MG: 25 TABLET ORAL at 08:30

## 2020-01-08 RX ADMIN — IPRATROPIUM BROMIDE AND ALBUTEROL SULFATE 1 AMPULE: .5; 3 SOLUTION RESPIRATORY (INHALATION) at 08:45

## 2020-01-08 RX ADMIN — BUDESONIDE 1000 MCG: 0.5 SUSPENSION RESPIRATORY (INHALATION) at 19:18

## 2020-01-08 RX ADMIN — BUDESONIDE 1000 MCG: 0.5 SUSPENSION RESPIRATORY (INHALATION) at 08:45

## 2020-01-08 RX ADMIN — TRAZODONE HYDROCHLORIDE 100 MG: 50 TABLET ORAL at 19:44

## 2020-01-08 RX ADMIN — IPRATROPIUM BROMIDE AND ALBUTEROL SULFATE 1 AMPULE: .5; 3 SOLUTION RESPIRATORY (INHALATION) at 19:19

## 2020-01-08 RX ADMIN — PANTOPRAZOLE SODIUM 40 MG: 40 TABLET, DELAYED RELEASE ORAL at 05:04

## 2020-01-08 RX ADMIN — LEVOTHYROXINE SODIUM 25 MCG: 25 TABLET ORAL at 05:04

## 2020-01-08 RX ADMIN — SODIUM CHLORIDE, PRESERVATIVE FREE 10 ML: 5 INJECTION INTRAVENOUS at 02:10

## 2020-01-08 RX ADMIN — FUROSEMIDE 20 MG: 20 TABLET ORAL at 19:43

## 2020-01-08 RX ADMIN — Medication 1 TABLET: at 19:43

## 2020-01-08 RX ADMIN — MORPHINE SULFATE 15 MG: 30 TABLET ORAL at 19:44

## 2020-01-08 RX ADMIN — ISOSORBIDE MONONITRATE 60 MG: 60 TABLET, EXTENDED RELEASE ORAL at 08:30

## 2020-01-08 RX ADMIN — MORPHINE SULFATE 15 MG: 30 TABLET ORAL at 08:29

## 2020-01-08 RX ADMIN — LISINOPRIL 5 MG: 5 TABLET ORAL at 08:30

## 2020-01-08 RX ADMIN — IPRATROPIUM BROMIDE AND ALBUTEROL SULFATE 1 AMPULE: .5; 3 SOLUTION RESPIRATORY (INHALATION) at 15:36

## 2020-01-08 RX ADMIN — FORMOTEROL FUMARATE DIHYDRATE 20 MCG: 20 SOLUTION RESPIRATORY (INHALATION) at 19:19

## 2020-01-08 RX ADMIN — PRAVASTATIN SODIUM 40 MG: 20 TABLET ORAL at 19:43

## 2020-01-08 RX ADMIN — CEFEPIME HYDROCHLORIDE 1 G: 1 INJECTION, POWDER, FOR SOLUTION INTRAMUSCULAR; INTRAVENOUS at 22:00

## 2020-01-08 RX ADMIN — PETROLATUM: 42 OINTMENT TOPICAL at 08:33

## 2020-01-08 RX ADMIN — PETROLATUM: 42 OINTMENT TOPICAL at 19:45

## 2020-01-08 RX ADMIN — METOPROLOL TARTRATE 25 MG: 25 TABLET ORAL at 19:44

## 2020-01-08 RX ADMIN — IPRATROPIUM BROMIDE AND ALBUTEROL SULFATE 1 AMPULE: .5; 3 SOLUTION RESPIRATORY (INHALATION) at 12:32

## 2020-01-08 RX ADMIN — APIXABAN 2.5 MG: 2.5 TABLET, FILM COATED ORAL at 08:30

## 2020-01-08 RX ADMIN — MAGNESIUM GLUCONATE 500 MG ORAL TABLET 200 MG: 500 TABLET ORAL at 19:43

## 2020-01-08 RX ADMIN — FORMOTEROL FUMARATE DIHYDRATE 20 MCG: 20 SOLUTION RESPIRATORY (INHALATION) at 08:45

## 2020-01-08 RX ADMIN — CEFEPIME HYDROCHLORIDE 1 G: 1 INJECTION, POWDER, FOR SOLUTION INTRAMUSCULAR; INTRAVENOUS at 08:37

## 2020-01-08 RX ADMIN — APIXABAN 2.5 MG: 2.5 TABLET, FILM COATED ORAL at 19:43

## 2020-01-08 ASSESSMENT — PAIN SCALES - GENERAL
PAINLEVEL_OUTOF10: 4
PAINLEVEL_OUTOF10: 5

## 2020-01-08 NOTE — PROGRESS NOTES
Pulmonary Progress Note    Admit Date: 2020  Hospital day                               PCP: No primary care provider on file. Chief Complaint (s):  Patient Active Problem List   Diagnosis    Generalized abdominal pain    Atrial fibrillation (HCC)    CAD (coronary artery disease)    Pacemaker    Chronic anticoagulation    Atrial tachycardia (HCC)    Chest pain    Valvular heart disease    Acute systolic CHF (congestive heart failure) (HCC)    Coagulopathy (HCC)    Terminal ileitis with rectal bleeding (HCC)    BRBPR (bright red blood per rectum)    Diarrhea    Nausea and vomiting    Supratherapeutic PT INR    Essential hypertension    Chronic diastolic CHF (congestive heart failure) (HCC)    CKD (chronic kidney disease)    Hyperlipidemia    Hypothyroidism    Abnormal chest sounds - suspect LLL pneumonia    Moderate protein-calorie malnutrition (HCC)    Acidosis    Anemia associated with acute blood loss    Cancer of ascending colon (HCC)    UTI (urinary tract infection)    Dyspnea    Palliative care by specialist    Goals of care, counseling/discussion    Pneumonia       Subjective:  · Breathing a little bit better.       Vitals:  VITALS:  BP (!) 150/67   Pulse 80   Temp 97.5 °F (36.4 °C) (Axillary)   Resp 16   Ht 5' 2\" (1.575 m)   Wt 132 lb 12.8 oz (60.2 kg)   SpO2 95%   BMI 24.29 kg/m²     24HR INTAKE/OUTPUT:      Intake/Output Summary (Last 24 hours) at 2020 1202  Last data filed at 2020 1140  Gross per 24 hour   Intake 2905 ml   Output 1050 ml   Net 1855 ml       24HR PULSE OXIMETRY RANGE:    SpO2  Av %  Min: 95 %  Max: 95 %    Medications:  IV:   sodium chloride 75 mL/hr at 20 0051       Scheduled Meds:   cefepime  1 g Intravenous Q12H    mineral oil-hydrophilic petrolatum   Topical BID    apixaban  2.5 mg Oral BID    budesonide  1,000 mcg Nebulization BID    formoterol  20 mcg Nebulization BID    furosemide  20 mg Oral Once per day on Mon Wed Fri    isosorbide mononitrate  60 mg Oral Daily    levothyroxine  25 mcg Oral Daily    lisinopril  5 mg Oral Daily    magnesium oxide  200 mg Oral Nightly    metoprolol tartrate  25 mg Oral BID    morphine  15 mg Oral BID    therapeutic multivitamin-minerals  1 tablet Oral Dinner    nystatin  5 mL Oral 4x Daily    pravastatin  40 mg Oral Nightly    traZODone  100 mg Oral Nightly    sodium chloride flush  10 mL Intravenous 2 times per day    ipratropium-albuterol  1 ampule Inhalation Q4H WA    pantoprazole  40 mg Oral QAM AC       Diet:   DIET CARDIAC; Dysphagia Soft and Bite-Sized; No Drinking Straw     EXAM:  General: No distress. Alert. Eyes: PERRL. No sclera icterus. No conjunctival injection. ENT: No discharge. Pharynx clear. Neck: Trachea midline. Normal thyroid. Resp: No accessory muscle use. Right-sided rales. No wheezing. Few rhonchi. CV: Irregular rate. Irregular rhythm. 3/6 SE murmur  Abd: Non-tender. Non-distended. No masses. No organomegaly. Normal bowel sounds. Skin: Warm and dry. No nodule on exposed extremities. No rash on exposed extremities. Ext: No cyanosis, clubbing, edema  Lymph: No cervical LAD. No supraclavicular LAD. M/S: No cyanosis. No joint deformity. No clubbing. Neuro: Awake. Follows commands. Positive pupils/gag/corneals. Normal pain response. Results:  CBC:   Recent Labs     01/06/20  0315 01/06/20  1815 01/07/20  0400   WBC 15.5*  --  10.5   HGB 7.3* 8.1* 8.4*   HCT 23.6* 25.3* 26.5*   MCV 97.1  --  95.7   *  --  113*     BMP:   Recent Labs     01/06/20  0315 01/07/20  0400    135   K 4.5 4.0    102   CO2 24 21*   BUN 19 16   CREATININE 0.9 0.8     LIVER PROFILE:   Recent Labs     01/07/20  0400   AST 13   ALT 13   BILITOT 0.7   ALKPHOS 119*     PT/INR:   No results for input(s): PROTIME, INR in the last 72 hours. APTT: No results for input(s):  APTT in the last 72

## 2020-01-09 ENCOUNTER — APPOINTMENT (OUTPATIENT)
Dept: GENERAL RADIOLOGY | Age: 85
DRG: 871 | End: 2020-01-09
Payer: MEDICARE

## 2020-01-09 LAB
ANION GAP SERPL CALCULATED.3IONS-SCNC: 12 MMOL/L (ref 7–16)
BUN BLDV-MCNC: 10 MG/DL (ref 8–23)
CALCIUM SERPL-MCNC: 8.9 MG/DL (ref 8.6–10.2)
CHLORIDE BLD-SCNC: 102 MMOL/L (ref 98–107)
CO2: 22 MMOL/L (ref 22–29)
CREAT SERPL-MCNC: 0.7 MG/DL (ref 0.5–1)
GFR AFRICAN AMERICAN: >60
GFR NON-AFRICAN AMERICAN: >60 ML/MIN/1.73
GLUCOSE BLD-MCNC: 124 MG/DL (ref 74–99)
HCT VFR BLD CALC: 25.6 % (ref 34–48)
HEMOGLOBIN: 8.1 G/DL (ref 11.5–15.5)
MCH RBC QN AUTO: 29.9 PG (ref 26–35)
MCHC RBC AUTO-ENTMCNC: 31.6 % (ref 32–34.5)
MCV RBC AUTO: 94.5 FL (ref 80–99.9)
PDW BLD-RTO: 15.5 FL (ref 11.5–15)
PLATELET # BLD: 158 E9/L (ref 130–450)
PMV BLD AUTO: 9.9 FL (ref 7–12)
POTASSIUM SERPL-SCNC: 3.7 MMOL/L (ref 3.5–5)
RBC # BLD: 2.71 E12/L (ref 3.5–5.5)
SODIUM BLD-SCNC: 136 MMOL/L (ref 132–146)
WBC # BLD: 7.1 E9/L (ref 4.5–11.5)

## 2020-01-09 PROCEDURE — 2580000003 HC RX 258: Performed by: INTERNAL MEDICINE

## 2020-01-09 PROCEDURE — 80048 BASIC METABOLIC PNL TOTAL CA: CPT

## 2020-01-09 PROCEDURE — 2700000000 HC OXYGEN THERAPY PER DAY

## 2020-01-09 PROCEDURE — 6360000002 HC RX W HCPCS: Performed by: INTERNAL MEDICINE

## 2020-01-09 PROCEDURE — 6370000000 HC RX 637 (ALT 250 FOR IP): Performed by: INTERNAL MEDICINE

## 2020-01-09 PROCEDURE — 97530 THERAPEUTIC ACTIVITIES: CPT

## 2020-01-09 PROCEDURE — 71046 X-RAY EXAM CHEST 2 VIEWS: CPT

## 2020-01-09 PROCEDURE — 85027 COMPLETE CBC AUTOMATED: CPT

## 2020-01-09 PROCEDURE — 94640 AIRWAY INHALATION TREATMENT: CPT

## 2020-01-09 PROCEDURE — 36415 COLL VENOUS BLD VENIPUNCTURE: CPT

## 2020-01-09 PROCEDURE — 92526 ORAL FUNCTION THERAPY: CPT | Performed by: SPEECH-LANGUAGE PATHOLOGIST

## 2020-01-09 PROCEDURE — 1200000000 HC SEMI PRIVATE

## 2020-01-09 RX ORDER — FUROSEMIDE 10 MG/ML
40 INJECTION INTRAMUSCULAR; INTRAVENOUS 2 TIMES DAILY
Status: COMPLETED | OUTPATIENT
Start: 2020-01-09 | End: 2020-01-10

## 2020-01-09 RX ADMIN — BUDESONIDE 1000 MCG: 0.5 SUSPENSION RESPIRATORY (INHALATION) at 20:51

## 2020-01-09 RX ADMIN — IPRATROPIUM BROMIDE AND ALBUTEROL SULFATE 1 AMPULE: .5; 3 SOLUTION RESPIRATORY (INHALATION) at 13:10

## 2020-01-09 RX ADMIN — APIXABAN 2.5 MG: 2.5 TABLET, FILM COATED ORAL at 22:07

## 2020-01-09 RX ADMIN — TRAZODONE HYDROCHLORIDE 100 MG: 50 TABLET ORAL at 22:07

## 2020-01-09 RX ADMIN — DOCUSATE SODIUM 100 MG: 100 CAPSULE, LIQUID FILLED ORAL at 10:29

## 2020-01-09 RX ADMIN — PRAVASTATIN SODIUM 40 MG: 20 TABLET ORAL at 22:07

## 2020-01-09 RX ADMIN — CEFEPIME HYDROCHLORIDE 1 G: 1 INJECTION, POWDER, FOR SOLUTION INTRAMUSCULAR; INTRAVENOUS at 10:33

## 2020-01-09 RX ADMIN — SODIUM CHLORIDE, PRESERVATIVE FREE 10 ML: 5 INJECTION INTRAVENOUS at 12:32

## 2020-01-09 RX ADMIN — FUROSEMIDE 40 MG: 10 INJECTION, SOLUTION INTRAMUSCULAR; INTRAVENOUS at 22:07

## 2020-01-09 RX ADMIN — LISINOPRIL 5 MG: 5 TABLET ORAL at 10:29

## 2020-01-09 RX ADMIN — IPRATROPIUM BROMIDE AND ALBUTEROL SULFATE 1 AMPULE: .5; 3 SOLUTION RESPIRATORY (INHALATION) at 08:24

## 2020-01-09 RX ADMIN — METOPROLOL TARTRATE 25 MG: 25 TABLET ORAL at 10:29

## 2020-01-09 RX ADMIN — BUDESONIDE 1000 MCG: 0.5 SUSPENSION RESPIRATORY (INHALATION) at 08:24

## 2020-01-09 RX ADMIN — IPRATROPIUM BROMIDE AND ALBUTEROL SULFATE 1 AMPULE: .5; 3 SOLUTION RESPIRATORY (INHALATION) at 20:51

## 2020-01-09 RX ADMIN — MORPHINE SULFATE 15 MG: 30 TABLET ORAL at 22:07

## 2020-01-09 RX ADMIN — PANTOPRAZOLE SODIUM 40 MG: 40 TABLET, DELAYED RELEASE ORAL at 05:06

## 2020-01-09 RX ADMIN — METOPROLOL TARTRATE 25 MG: 25 TABLET ORAL at 22:07

## 2020-01-09 RX ADMIN — FORMOTEROL FUMARATE DIHYDRATE 20 MCG: 20 SOLUTION RESPIRATORY (INHALATION) at 20:51

## 2020-01-09 RX ADMIN — FORMOTEROL FUMARATE DIHYDRATE 20 MCG: 20 SOLUTION RESPIRATORY (INHALATION) at 08:24

## 2020-01-09 RX ADMIN — IPRATROPIUM BROMIDE AND ALBUTEROL SULFATE 1 AMPULE: .5; 3 SOLUTION RESPIRATORY (INHALATION) at 17:22

## 2020-01-09 RX ADMIN — FUROSEMIDE 40 MG: 10 INJECTION, SOLUTION INTRAMUSCULAR; INTRAVENOUS at 10:29

## 2020-01-09 RX ADMIN — ISOSORBIDE MONONITRATE 60 MG: 60 TABLET, EXTENDED RELEASE ORAL at 10:29

## 2020-01-09 RX ADMIN — CEFEPIME HYDROCHLORIDE 1 G: 1 INJECTION, POWDER, FOR SOLUTION INTRAMUSCULAR; INTRAVENOUS at 22:08

## 2020-01-09 RX ADMIN — LEVOTHYROXINE SODIUM 25 MCG: 25 TABLET ORAL at 05:06

## 2020-01-09 RX ADMIN — PETROLATUM: 42 OINTMENT TOPICAL at 22:09

## 2020-01-09 RX ADMIN — Medication 1 TABLET: at 16:11

## 2020-01-09 RX ADMIN — MORPHINE SULFATE 15 MG: 30 TABLET ORAL at 10:29

## 2020-01-09 RX ADMIN — APIXABAN 2.5 MG: 2.5 TABLET, FILM COATED ORAL at 10:29

## 2020-01-09 RX ADMIN — MAGNESIUM GLUCONATE 500 MG ORAL TABLET 200 MG: 500 TABLET ORAL at 22:07

## 2020-01-09 RX ADMIN — PETROLATUM: 42 OINTMENT TOPICAL at 10:32

## 2020-01-09 ASSESSMENT — PAIN DESCRIPTION - FREQUENCY: FREQUENCY: CONTINUOUS

## 2020-01-09 ASSESSMENT — PAIN DESCRIPTION - PROGRESSION: CLINICAL_PROGRESSION: NOT CHANGED

## 2020-01-09 ASSESSMENT — PAIN DESCRIPTION - PAIN TYPE: TYPE: CHRONIC PAIN

## 2020-01-09 ASSESSMENT — PAIN - FUNCTIONAL ASSESSMENT: PAIN_FUNCTIONAL_ASSESSMENT: ACTIVITIES ARE NOT PREVENTED

## 2020-01-09 ASSESSMENT — PAIN DESCRIPTION - ONSET: ONSET: ON-GOING

## 2020-01-09 ASSESSMENT — PAIN SCALES - GENERAL
PAINLEVEL_OUTOF10: 0
PAINLEVEL_OUTOF10: 0
PAINLEVEL_OUTOF10: 5
PAINLEVEL_OUTOF10: 8

## 2020-01-09 ASSESSMENT — PAIN DESCRIPTION - LOCATION: LOCATION: BACK

## 2020-01-09 ASSESSMENT — PAIN DESCRIPTION - DESCRIPTORS: DESCRIPTORS: ACHING

## 2020-01-09 NOTE — PROGRESS NOTES
[facility] Quality Flow/Interdisciplinary Rounds Progress Note        Quality Flow Rounds held on January 9, 2020    Disciplines Attending:  Bedside Nurse, ,  and Nursing Unit Leadership    Megan Freire was admitted on 1/5/2020 10:26 AM    Anticipated Discharge Date:  Expected Discharge Date: 01/09/20    Disposition:    Tima Score:  Tima Scale Score: 16    Readmission Risk              Risk of Unplanned Readmission:        23           Discussed patient goal for the day, patient clinical progression, and barriers to discharge.   The following Goal(s) of the Day/Commitment(s) have been identified:  Diagnostics - Report Results and Labs - Report Results      Kristyn Cortez  January 9, 2020

## 2020-01-09 NOTE — PROGRESS NOTES
Subjective:    Awake and alert. No problems overnight. Denies chest pain or dyspnea. Denies abdominal pain. Tolerating diet. No nausea or vomiting. Objective:    BP (!) 179/81   Pulse 107   Temp 97.6 °F (36.4 °C) (Oral)   Resp 18   Ht 5' 2\" (1.575 m)   Wt 133 lb (60.3 kg)   SpO2 96%   BMI 24.33 kg/m²   Skin: Warm and dry  Neck: Supple, no JVD  Heart:  RRR, no murmurs, gallops, or rubs. Lungs: Bibasilar crackles  Abd: bowel sounds present, nontender, nondistended, no masses  Extrem:  No clubbing, cyanosis, or edema, pulses intact    I/O last 3 completed shifts: In: 3188 [I.V.:3138; IV Piggyback:50]  Out: 1900 [Urine:1900]    Laboratory:     CBC with Differential:    Lab Results   Component Value Date    WBC 7.1 01/09/2020    RBC 2.71 01/09/2020    HGB 8.1 01/09/2020    HCT 25.6 01/09/2020     01/09/2020    MCV 94.5 01/09/2020    MCH 29.9 01/09/2020    MCHC 31.6 01/09/2020    RDW 15.5 01/09/2020    NRBC 0.0 12/18/2019    SEGSPCT 59 03/14/2014    METASPCT 1.0 09/26/2018    LYMPHOPCT 6.6 01/07/2020    MONOPCT 7.2 01/07/2020    BASOPCT 0.3 01/07/2020    MONOSABS 0.76 01/07/2020    LYMPHSABS 0.70 01/07/2020    EOSABS 0.23 01/07/2020    BASOSABS 0.03 01/07/2020     BMP:    Lab Results   Component Value Date     01/09/2020    K 3.7 01/09/2020    K 4.5 01/06/2020     01/09/2020    CO2 22 01/09/2020    BUN 10 01/09/2020    LABALBU 3.3 01/07/2020    LABALBU 3.6 05/25/2012    CREATININE 0.7 01/09/2020    CALCIUM 8.9 01/09/2020    GFRAA >60 01/09/2020    LABGLOM >60 01/09/2020    GLUCOSE 124 01/09/2020    GLUCOSE 118 05/25/2012        XR CHEST STANDARD (2 VW)   Final Result      1. Moderate CHF was only minimally improved. 2. Increasing size of moderately large bilateral pleural effusions and   worsening basilar atelectasis.                XR CHEST PORTABLE   Final Result   Findings compatible with worsening congestive heart failure               XR CHEST PORTABLE   Final Result regurgitation    Acute cardiogenic pulmonary edema    Acute on chronic diastolic heart failure    Plan:    Continue intravenous antibiotics    Continue IV Lasix as per pulmonary    Continue diet with modifications      Radha Wood D.O., Rosario Samson  2:14 PM  1/9/2020

## 2020-01-09 NOTE — PROGRESS NOTES
Oral Daily    levothyroxine  25 mcg Oral Daily    lisinopril  5 mg Oral Daily    magnesium oxide  200 mg Oral Nightly    metoprolol tartrate  25 mg Oral BID    morphine  15 mg Oral BID    therapeutic multivitamin-minerals  1 tablet Oral Dinner    pravastatin  40 mg Oral Nightly    traZODone  100 mg Oral Nightly    sodium chloride flush  10 mL Intravenous 2 times per day    ipratropium-albuterol  1 ampule Inhalation Q4H WA    pantoprazole  40 mg Oral QAM AC       Diet:   DIET CARDIAC; Dysphagia Soft and Bite-Sized; Mildly Thick (Nectar); No Drinking Straw     EXAM:  General: No distress. Alert. Eyes: PERRL. No sclera icterus. No conjunctival injection. ENT: No discharge. Pharynx clear. Neck: Trachea midline. Normal thyroid. Resp: No accessory muscle use. Right-sided rales. No wheezing. Few rhonchi. CV: Irregular rate. Irregular rhythm. 3/6 SE murmur  Abd: Non-tender. Non-distended. No masses. No organomegaly. Normal bowel sounds. Skin: Warm and dry. No nodule on exposed extremities. No rash on exposed extremities. Ext: No cyanosis, clubbing, edema  Lymph: No cervical LAD. No supraclavicular LAD. M/S: No cyanosis. No joint deformity. No clubbing. Neuro: Awake. Follows commands. Positive pupils/gag/corneals. Normal pain response. Results:  CBC:   Recent Labs     01/06/20  1815 01/07/20  0400 01/09/20  0251   WBC  --  10.5 7.1   HGB 8.1* 8.4* 8.1*   HCT 25.3* 26.5* 25.6*   MCV  --  95.7 94.5   PLT  --  113* 158     BMP:   Recent Labs     01/07/20  0400 01/09/20  0251    136   K 4.0 3.7    102   CO2 21* 22   BUN 16 10   CREATININE 0.8 0.7     LIVER PROFILE:   Recent Labs     01/07/20  0400   AST 13   ALT 13   BILITOT 0.7   ALKPHOS 119*     PT/INR:   No results for input(s): PROTIME, INR in the last 72 hours. APTT: No results for input(s): APTT in the last 72 hours. Pathology:  1. N/A      Microbiology:  1.  None    Recent ABG:   No results for input(s): PH, PO2, PCO2,

## 2020-01-10 LAB
ANION GAP SERPL CALCULATED.3IONS-SCNC: 11 MMOL/L (ref 7–16)
BLOOD CULTURE, ROUTINE: NORMAL
BUN BLDV-MCNC: 9 MG/DL (ref 8–23)
CALCIUM SERPL-MCNC: 8.7 MG/DL (ref 8.6–10.2)
CHLORIDE BLD-SCNC: 100 MMOL/L (ref 98–107)
CO2: 26 MMOL/L (ref 22–29)
CREAT SERPL-MCNC: 0.9 MG/DL (ref 0.5–1)
CULTURE, BLOOD 2: NORMAL
GFR AFRICAN AMERICAN: >60
GFR NON-AFRICAN AMERICAN: 59 ML/MIN/1.73
GLUCOSE BLD-MCNC: 191 MG/DL (ref 74–99)
GRAM STAIN ORDERABLE: NORMAL
POTASSIUM SERPL-SCNC: 3 MMOL/L (ref 3.5–5)
PRO-BNP: 5092 PG/ML (ref 0–450)
SODIUM BLD-SCNC: 137 MMOL/L (ref 132–146)

## 2020-01-10 PROCEDURE — 2580000003 HC RX 258: Performed by: INTERNAL MEDICINE

## 2020-01-10 PROCEDURE — 36415 COLL VENOUS BLD VENIPUNCTURE: CPT

## 2020-01-10 PROCEDURE — 1200000000 HC SEMI PRIVATE

## 2020-01-10 PROCEDURE — 6360000002 HC RX W HCPCS: Performed by: INTERNAL MEDICINE

## 2020-01-10 PROCEDURE — 83880 ASSAY OF NATRIURETIC PEPTIDE: CPT

## 2020-01-10 PROCEDURE — 6370000000 HC RX 637 (ALT 250 FOR IP): Performed by: INTERNAL MEDICINE

## 2020-01-10 PROCEDURE — 80048 BASIC METABOLIC PNL TOTAL CA: CPT

## 2020-01-10 PROCEDURE — 94640 AIRWAY INHALATION TREATMENT: CPT

## 2020-01-10 PROCEDURE — 2700000000 HC OXYGEN THERAPY PER DAY

## 2020-01-10 PROCEDURE — 92526 ORAL FUNCTION THERAPY: CPT | Performed by: SPEECH-LANGUAGE PATHOLOGIST

## 2020-01-10 PROCEDURE — 97110 THERAPEUTIC EXERCISES: CPT

## 2020-01-10 RX ORDER — POTASSIUM CHLORIDE 20 MEQ/1
40 TABLET, EXTENDED RELEASE ORAL ONCE
Status: COMPLETED | OUTPATIENT
Start: 2020-01-10 | End: 2020-01-10

## 2020-01-10 RX ADMIN — MORPHINE SULFATE 15 MG: 30 TABLET ORAL at 09:16

## 2020-01-10 RX ADMIN — ISOSORBIDE MONONITRATE 60 MG: 60 TABLET, EXTENDED RELEASE ORAL at 09:16

## 2020-01-10 RX ADMIN — APIXABAN 2.5 MG: 2.5 TABLET, FILM COATED ORAL at 09:16

## 2020-01-10 RX ADMIN — PANTOPRAZOLE SODIUM 40 MG: 40 TABLET, DELAYED RELEASE ORAL at 07:21

## 2020-01-10 RX ADMIN — IPRATROPIUM BROMIDE AND ALBUTEROL SULFATE 1 AMPULE: .5; 3 SOLUTION RESPIRATORY (INHALATION) at 11:34

## 2020-01-10 RX ADMIN — CEFEPIME HYDROCHLORIDE 1 G: 1 INJECTION, POWDER, FOR SOLUTION INTRAMUSCULAR; INTRAVENOUS at 09:21

## 2020-01-10 RX ADMIN — LISINOPRIL 5 MG: 5 TABLET ORAL at 09:16

## 2020-01-10 RX ADMIN — TRAZODONE HYDROCHLORIDE 100 MG: 50 TABLET ORAL at 21:55

## 2020-01-10 RX ADMIN — IPRATROPIUM BROMIDE AND ALBUTEROL SULFATE 1 AMPULE: .5; 3 SOLUTION RESPIRATORY (INHALATION) at 19:44

## 2020-01-10 RX ADMIN — FUROSEMIDE 40 MG: 10 INJECTION, SOLUTION INTRAMUSCULAR; INTRAVENOUS at 09:21

## 2020-01-10 RX ADMIN — MAGNESIUM GLUCONATE 500 MG ORAL TABLET 200 MG: 500 TABLET ORAL at 21:55

## 2020-01-10 RX ADMIN — MORPHINE SULFATE 15 MG: 30 TABLET ORAL at 21:56

## 2020-01-10 RX ADMIN — LEVOTHYROXINE SODIUM 25 MCG: 25 TABLET ORAL at 07:21

## 2020-01-10 RX ADMIN — FUROSEMIDE 40 MG: 10 INJECTION, SOLUTION INTRAMUSCULAR; INTRAVENOUS at 21:55

## 2020-01-10 RX ADMIN — CEFEPIME HYDROCHLORIDE 1 G: 1 INJECTION, POWDER, FOR SOLUTION INTRAMUSCULAR; INTRAVENOUS at 21:54

## 2020-01-10 RX ADMIN — APIXABAN 2.5 MG: 2.5 TABLET, FILM COATED ORAL at 21:56

## 2020-01-10 RX ADMIN — POTASSIUM CHLORIDE 40 MEQ: 20 TABLET, EXTENDED RELEASE ORAL at 14:50

## 2020-01-10 RX ADMIN — BUDESONIDE 1000 MCG: 0.5 SUSPENSION RESPIRATORY (INHALATION) at 19:44

## 2020-01-10 RX ADMIN — IPRATROPIUM BROMIDE AND ALBUTEROL SULFATE 1 AMPULE: .5; 3 SOLUTION RESPIRATORY (INHALATION) at 15:35

## 2020-01-10 RX ADMIN — PETROLATUM: 42 OINTMENT TOPICAL at 21:58

## 2020-01-10 RX ADMIN — SODIUM CHLORIDE, PRESERVATIVE FREE 10 ML: 5 INJECTION INTRAVENOUS at 09:18

## 2020-01-10 RX ADMIN — PETROLATUM: 42 OINTMENT TOPICAL at 09:16

## 2020-01-10 RX ADMIN — METOPROLOL TARTRATE 25 MG: 25 TABLET ORAL at 09:16

## 2020-01-10 RX ADMIN — IPRATROPIUM BROMIDE AND ALBUTEROL SULFATE 1 AMPULE: .5; 3 SOLUTION RESPIRATORY (INHALATION) at 07:39

## 2020-01-10 RX ADMIN — FORMOTEROL FUMARATE DIHYDRATE 20 MCG: 20 SOLUTION RESPIRATORY (INHALATION) at 07:39

## 2020-01-10 RX ADMIN — DOCUSATE SODIUM 100 MG: 100 CAPSULE, LIQUID FILLED ORAL at 09:16

## 2020-01-10 RX ADMIN — FORMOTEROL FUMARATE DIHYDRATE 20 MCG: 20 SOLUTION RESPIRATORY (INHALATION) at 19:44

## 2020-01-10 RX ADMIN — METOPROLOL TARTRATE 25 MG: 25 TABLET ORAL at 21:55

## 2020-01-10 RX ADMIN — Medication 1 TABLET: at 16:30

## 2020-01-10 RX ADMIN — PRAVASTATIN SODIUM 40 MG: 20 TABLET ORAL at 21:55

## 2020-01-10 RX ADMIN — BUDESONIDE 1000 MCG: 0.5 SUSPENSION RESPIRATORY (INHALATION) at 07:39

## 2020-01-10 ASSESSMENT — PAIN SCALES - GENERAL
PAINLEVEL_OUTOF10: 6
PAINLEVEL_OUTOF10: 5

## 2020-01-10 NOTE — CONSULTS
[facility]  Hematology/Oncology  Consult      Patient Name: Norbert Pittman  YOB: 1928  PCP: No primary care provider on file. Referring Provider:      Reason for Consultation:   Chief Complaint   Patient presents with    Fatigue    Cough        History of Present Illness:  80-year-old woman with multiple medical problems including coronary artery disease, paroxysmal defibrillation anticoagulated on Eliquis, chronic kidney disease transferred from skilled nursing facility by EMS with hypoxia altered mental status and lethargy. Patient was diagnosed with RSV in December right upper lobe pneumonia. Repeat chest x-ray in the emergency room showed new infiltrate in the right lower lobe and she was readmitted for further treatment. She was seen by pulmonary with clinical suspicion of aspiration pneumonia in the right lower lobe and she is on antibiotics with cefepime. We are consulted for anemia. On reviewing her old records patient has had chronic anemia dating back to 2015 with hemoglobin ranging between 8 and 10. Most recent CBC shows a hemoglobin of 8.1 with an MCV of 94 normal WBC count and normal platelet count her differential is relevant for moderate lymphopenia. Her BMP shows a serum creatinine of 0.9 with borderline GFR of 59 mL/min. Hepatic panel shows hypoalbuminemia and low total proteins with normal transaminases. Iron studies showed low serum iron and TIBC with low saturation and elevated ferritin at 322  Her CEA was borderline elevated at 5.5.     Diagnostic Data:     Past Medical History:   Diagnosis Date    [facility]fib ([facility]     Arthritis     Atrial fibrillation ([facility]     Blood transfusion     Blood transfusion reaction     CAD (coronary artery disease)     Cancer (HCC)     hx skin ca nose    CHF (congestive heart failure) (HCC)     Chronic back pain     Chronic kidney disease     UTI    GERD (gastroesophageal reflux disease)     GI bleed     History of blood with few rhonchi  Extremities: No edema,no cyanosis, no clubbing. Abdomen: Soft, non-tender;no masses, no organomegaly  Neurologic:Cranial nerves grossly intact. No focal motor or sensory deficits . Recent Laboratory Data-   Lab Results   Component Value Date    WBC 7.1 2020    HGB 8.1 (L) 2020    HCT 25.6 (L) 2020    MCV 94.5 2020     2020    LYMPHOPCT 6.6 (L) 2020    RBC 2.71 (L) 2020    MCH 29.9 2020    MCHC 31.6 (L) 2020    RDW 15.5 (H) 2020    NEUTOPHILPCT 82.6 (H) 2020    MONOPCT 7.2 2020    BASOPCT 0.3 2020    NEUTROABS 8.70 (H) 2020    LYMPHSABS 0.70 (L) 2020    MONOSABS 0.76 2020    EOSABS 0.23 2020    BASOSABS 0.03 2020       Lab Results   Component Value Date     01/10/2020    K 3.0 (L) 01/10/2020     01/10/2020    CO2 26 01/10/2020    BUN 9 01/10/2020    CREATININE 0.9 01/10/2020    GLUCOSE 191 (H) 01/10/2020    CALCIUM 8.7 01/10/2020    PROT 5.8 (L) 2020    LABALBU 3.3 (L) 2020    BILITOT 0.7 2020    ALKPHOS 119 (H) 2020    AST 13 2020    ALT 13 2020    LABGLOM 59 01/10/2020    GFRAA >60 01/10/2020       Lab Results   Component Value Date    IRON 27 (L) 2020    TIBC 134 (L) 2020    FERRITIN 322 2020           Radiology-    Ct Abdomen Pelvis Wo Contrast    Result Date: 12/15/2019  Patient MRN:  [id number] : 1928 Age: 80 years Gender: Female Order Date:  12/15/2019 4:15 PM EXAM: CT ABDOMEN PELVIS WO CONTRAST NUMBER OF IMAGES \ views:  12 INDICATION: Abdominal pain, urinary tract infection, urinary calculi, reported pyelonephritis COMPARISON: CT abdomen pelvis 2019, 3181 TECHNIQUE: Helical imaging was extended from the lower chest to the lower pelvis without contrast, and axial images were supplemented with sagittal and coronal MPR images.  Low-dose CT  acquisition technique included one of following Order Date: 12/15/2019 12:30 PM Exam: XR CHEST PORTABLE Number of Images: 1 view Indication:   wheezing wheezing Comparison: Prior chest radiograph from 10/03/2018 is available Findings: Study demonstrate cardiomegaly with median sternotomy. There is a left-sided pacemaker with lead in right atrium and right ventricle. The lung fields are clear. The bony thorax demonstrate osteopenia. There is uncoiling and atherosclerotic change of thoracic aorta. Cardiomegaly with median sternotomy No evidence of airspace consolidation or pulmonary venous congestion. ASSESSMENT/PLAN : 80year-old woman    Chronic anemia by history normocytic. Her anemia is multifactorial and contributed to by myelosuppression by recurrent recent pneumonias. She had RSV pneumonia back in December 2019 and now there is suspicion for aspiration pneumonia. Her anemia dates back at least to 2015, low-grade MDS such as R ARS is in the differential with her old age. B12, folate levels, LDH, reticulocyte count and serum electrophoresis will be done. She is not a candidate for diagnostic bone marrow at her age. To continue antibiotics and supportive care. Transfuse for hemoglobin below 7. Thank you        Theodore Murillo. Vinay Judd M.D., F.A.C.P.   Electronically signed 1/10/2020 at 3:37 PM

## 2020-01-10 NOTE — PROGRESS NOTES
Pulmonary Progress Note    Admit Date: 2020  Hospital day                               PCP: No primary care provider on file. Chief Complaint (s):  Patient Active Problem List   Diagnosis    Generalized abdominal pain    Atrial fibrillation (HCC)    CAD (coronary artery disease)    Pacemaker    Chronic anticoagulation    Atrial tachycardia (HCC)    Chest pain    Valvular heart disease    Acute systolic CHF (congestive heart failure) (HCC)    Coagulopathy (HCC)    Terminal ileitis with rectal bleeding (HCC)    BRBPR (bright red blood per rectum)    Diarrhea    Nausea and vomiting    Supratherapeutic PT INR    Essential hypertension    Chronic diastolic CHF (congestive heart failure) (HCC)    CKD (chronic kidney disease)    Hyperlipidemia    Hypothyroidism    Abnormal chest sounds - suspect LLL pneumonia    Moderate protein-calorie malnutrition (HCC)    Acidosis    Anemia associated with acute blood loss    Cancer of ascending colon (HCC)    UTI (urinary tract infection)    Dyspnea    Palliative care by specialist    Goals of care, counseling/discussion    Pneumonia       Subjective:  · Resting comfortably this a.m. Excellent response to diuretics.       Vitals:  VITALS:  BP (!) 152/65   Pulse 82   Temp 97.5 °F (36.4 °C) (Axillary)   Resp 16   Ht 5' 2\" (1.575 m)   Wt 127 lb (57.6 kg)   SpO2 100%   BMI 23.23 kg/m²     24HR INTAKE/OUTPUT:      Intake/Output Summary (Last 24 hours) at 1/10/2020 1144  Last data filed at 1/10/2020 0624  Gross per 24 hour   Intake 420 ml   Output 2300 ml   Net -1880 ml       24HR PULSE OXIMETRY RANGE:    SpO2  Av %  Min: 95 %  Max: 100 %    Medications:  IV:      Scheduled Meds:   furosemide  40 mg Intravenous BID    cefepime  1 g Intravenous Q12H    mineral oil-hydrophilic petrolatum   Topical BID    apixaban  2.5 mg Oral BID    budesonide  1,000 mcg Nebulization BID    formoterol  20 mcg Nebulization BID    isosorbide mononitrate  60 mg Oral Daily    levothyroxine  25 mcg Oral Daily    lisinopril  5 mg Oral Daily    magnesium oxide  200 mg Oral Nightly    metoprolol tartrate  25 mg Oral BID    morphine  15 mg Oral BID    therapeutic multivitamin-minerals  1 tablet Oral Dinner    pravastatin  40 mg Oral Nightly    traZODone  100 mg Oral Nightly    sodium chloride flush  10 mL Intravenous 2 times per day    ipratropium-albuterol  1 ampule Inhalation Q4H WA    pantoprazole  40 mg Oral QAM AC       Diet:   DIET CARDIAC; Dysphagia Soft and Bite-Sized; Mildly Thick (Nectar); No Drinking Straw     EXAM:  General: No distress. Alert. Eyes: PERRL. No sclera icterus. No conjunctival injection. ENT: No discharge. Pharynx clear. Neck: Trachea midline. Normal thyroid. Resp: No accessory muscle use. Right-sided rales. No wheezing. Few rhonchi. CV: Irregular rate. Irregular rhythm. 3/6 SE murmur  Abd: Non-tender. Non-distended. No masses. No organomegaly. Normal bowel sounds. Skin: Warm and dry. No nodule on exposed extremities. No rash on exposed extremities. Ext: No cyanosis, clubbing, edema  Lymph: No cervical LAD. No supraclavicular LAD. M/S: No cyanosis. No joint deformity. No clubbing. Neuro: Awake. Follows commands. Positive pupils/gag/corneals. Normal pain response. Results:  CBC:   Recent Labs     01/09/20  0251   WBC 7.1   HGB 8.1*   HCT 25.6*   MCV 94.5        BMP:   Recent Labs     01/09/20  0251 01/10/20  0957    137   K 3.7 3.0*    100   CO2 22 26   BUN 10 9   CREATININE 0.7 0.9     LIVER PROFILE:   No results for input(s): AST, ALT, LIPASE, BILIDIR, BILITOT, ALKPHOS in the last 72 hours. Invalid input(s): AMYLASE,  ALB  PT/INR:   No results for input(s): PROTIME, INR in the last 72 hours. APTT: No results for input(s): APTT in the last 72 hours. Pathology:  1. N/A      Microbiology:  1.  None    Recent ABG:   No results for input(s): PH, PO2, PCO2, HCO3, BE, O2SAT, METHB, O2HB, COHB, O2CON, HHB, THB in the last 72 hours. Recent Films:  XR CHEST STANDARD (2 VW)   Final Result      1. Moderate CHF was only minimally improved. 2. Increasing size of moderately large bilateral pleural effusions and   worsening basilar atelectasis. XR CHEST PORTABLE   Final Result   Findings compatible with worsening congestive heart failure               XR CHEST PORTABLE   Final Result   There are consolidative opacities in the right lower   lobe, concerning for pneumonia.                                  Assessment:  1. Aspiration pneumonia to the superior segment of the right lower lobe as well as medial segment of right lower lobe. 2. Recent RSV pneumonia: As can be seen above, the right upper lobe infiltrate has cleared. 3. Overt volume overload        Plan:  1. Continue cefepime, complete 5 days. 2. Aerosol treatments  3. Flutter valve  4. Dietary modifications  5. Continue diuretics     Care reviewed with the staff and the patient's family as available. Please note that voice recognition technology was used in the preparation of this note and make therefore it may contain inadvertent transcription errors. Santi Bhatt M.D., F.C.C.P.     [facility], [address] [place] [address], [address] [place] [address]

## 2020-01-10 NOTE — PROGRESS NOTES
SPEECH LANGUAGE PATHOLOGY  DAILY PROGRESS NOTE            SWALLOWING    Pt upright in chair, daughter present. Report good toleration of current diet. Pt accepted  nectar thick liquid per cup and trial per straw during session. DYSPHAGIA DIAGNOSIS:  Oropharyngeal dysphagia-clinical s/s of aspiration observed with thin liquids                 DIET RECOMMENDATIONS:  Dysphagia 3, Soft/advanced (Soft & Bite-sized) solids with small sip nectar thick liquids as tolerate-OK FOR STRAWS                FEEDING RECOMMENDATIONS:                           Assistance level:  No assistance needed                             Compensatory strategies recommended: Small bites/sips and No straw         Oral- adequate labial seal and A-P transfer, no oral residue      Pharyngeal- no overt s/s of aspiration, no multiple swallows      Education- Pt/dtr educated on results and POC. Pt/dtr trained on compensatory strategies for safe swallow with good outcome. Educated on [employer] as possible option with improved medical status. Educated on foods to avoid while on thickened liquids. Educated on Catherine . Questions answered. Will continue SP intervention as per previously established POC. Rafia PARK CCC/SLP [id number]  Speech Pathologist

## 2020-01-10 NOTE — CARE COORDINATION
Met with patient and daughter at bedside, plan remains transfer to  [facility] on discharge. Patient is Medicaid bed hold and no prior auth is required as per  note 1/6/20  Isaías Arias.  Schuyler, MSN, RN  [facility] Case Management  [phone]

## 2020-01-11 VITALS
DIASTOLIC BLOOD PRESSURE: 51 MMHG | TEMPERATURE: 98.7 F | BODY MASS INDEX: 23.06 KG/M2 | HEART RATE: 81 BPM | HEIGHT: 62 IN | SYSTOLIC BLOOD PRESSURE: 111 MMHG | OXYGEN SATURATION: 100 % | WEIGHT: 125.3 LBS | RESPIRATION RATE: 18 BRPM

## 2020-01-11 LAB
ANION GAP SERPL CALCULATED.3IONS-SCNC: 9 MMOL/L (ref 7–16)
BUN BLDV-MCNC: 10 MG/DL (ref 8–23)
CALCIUM SERPL-MCNC: 9.1 MG/DL (ref 8.6–10.2)
CHLORIDE BLD-SCNC: 96 MMOL/L (ref 98–107)
CO2: 29 MMOL/L (ref 22–29)
CREAT SERPL-MCNC: 1 MG/DL (ref 0.5–1)
FOLATE: 19.6 NG/ML (ref 4.8–24.2)
GFR AFRICAN AMERICAN: >60
GFR NON-AFRICAN AMERICAN: 52 ML/MIN/1.73
GLUCOSE BLD-MCNC: 142 MG/DL (ref 74–99)
HCT VFR BLD CALC: 24.3 % (ref 34–48)
IMMATURE RETIC FRACT: 21 % (ref 3–15.9)
LACTATE DEHYDROGENASE: 246 U/L (ref 135–214)
POTASSIUM SERPL-SCNC: 3.7 MMOL/L (ref 3.5–5)
RETIC HGB EQUIVALENT: 28.5 PG (ref 28.2–36.6)
RETICULOCYTE ABSOLUTE COUNT: 0.13 E12/L
RETICULOCYTE COUNT PCT: 5.2 % (ref 0.4–1.9)
SODIUM BLD-SCNC: 134 MMOL/L (ref 132–146)
VITAMIN B-12: 1482 PG/ML (ref 211–946)

## 2020-01-11 PROCEDURE — 2580000003 HC RX 258: Performed by: INTERNAL MEDICINE

## 2020-01-11 PROCEDURE — 36415 COLL VENOUS BLD VENIPUNCTURE: CPT

## 2020-01-11 PROCEDURE — 84165 PROTEIN E-PHORESIS SERUM: CPT

## 2020-01-11 PROCEDURE — 86334 IMMUNOFIX E-PHORESIS SERUM: CPT

## 2020-01-11 PROCEDURE — 85045 AUTOMATED RETICULOCYTE COUNT: CPT

## 2020-01-11 PROCEDURE — 82607 VITAMIN B-12: CPT

## 2020-01-11 PROCEDURE — 97530 THERAPEUTIC ACTIVITIES: CPT

## 2020-01-11 PROCEDURE — 82784 ASSAY IGA/IGD/IGG/IGM EACH: CPT

## 2020-01-11 PROCEDURE — 80048 BASIC METABOLIC PNL TOTAL CA: CPT

## 2020-01-11 PROCEDURE — 82746 ASSAY OF FOLIC ACID SERUM: CPT

## 2020-01-11 PROCEDURE — 6360000002 HC RX W HCPCS: Performed by: INTERNAL MEDICINE

## 2020-01-11 PROCEDURE — 2700000000 HC OXYGEN THERAPY PER DAY

## 2020-01-11 PROCEDURE — 94640 AIRWAY INHALATION TREATMENT: CPT

## 2020-01-11 PROCEDURE — 83615 LACTATE (LD) (LDH) ENZYME: CPT

## 2020-01-11 PROCEDURE — 6370000000 HC RX 637 (ALT 250 FOR IP): Performed by: INTERNAL MEDICINE

## 2020-01-11 PROCEDURE — 97110 THERAPEUTIC EXERCISES: CPT

## 2020-01-11 RX ADMIN — PANTOPRAZOLE SODIUM 40 MG: 40 TABLET, DELAYED RELEASE ORAL at 06:20

## 2020-01-11 RX ADMIN — IPRATROPIUM BROMIDE AND ALBUTEROL SULFATE 1 AMPULE: .5; 3 SOLUTION RESPIRATORY (INHALATION) at 14:31

## 2020-01-11 RX ADMIN — MORPHINE SULFATE 15 MG: 30 TABLET ORAL at 08:40

## 2020-01-11 RX ADMIN — FORMOTEROL FUMARATE DIHYDRATE 20 MCG: 20 SOLUTION RESPIRATORY (INHALATION) at 09:48

## 2020-01-11 RX ADMIN — APIXABAN 2.5 MG: 2.5 TABLET, FILM COATED ORAL at 08:37

## 2020-01-11 RX ADMIN — LISINOPRIL 5 MG: 5 TABLET ORAL at 08:37

## 2020-01-11 RX ADMIN — SODIUM CHLORIDE, PRESERVATIVE FREE 10 ML: 5 INJECTION INTRAVENOUS at 01:00

## 2020-01-11 RX ADMIN — BUDESONIDE 1000 MCG: 0.5 SUSPENSION RESPIRATORY (INHALATION) at 09:48

## 2020-01-11 RX ADMIN — LEVOTHYROXINE SODIUM 25 MCG: 25 TABLET ORAL at 06:20

## 2020-01-11 RX ADMIN — IPRATROPIUM BROMIDE AND ALBUTEROL SULFATE 1 AMPULE: .5; 3 SOLUTION RESPIRATORY (INHALATION) at 09:48

## 2020-01-11 RX ADMIN — MAGNESIUM HYDROXIDE 30 ML: 400 SUSPENSION ORAL at 14:17

## 2020-01-11 RX ADMIN — PETROLATUM: 42 OINTMENT TOPICAL at 08:39

## 2020-01-11 RX ADMIN — Medication 10 ML: at 12:48

## 2020-01-11 RX ADMIN — Medication 10 ML: at 12:44

## 2020-01-11 RX ADMIN — CEFEPIME HYDROCHLORIDE 1 G: 1 INJECTION, POWDER, FOR SOLUTION INTRAMUSCULAR; INTRAVENOUS at 08:36

## 2020-01-11 RX ADMIN — ISOSORBIDE MONONITRATE 60 MG: 60 TABLET, EXTENDED RELEASE ORAL at 08:37

## 2020-01-11 RX ADMIN — METOPROLOL TARTRATE 25 MG: 25 TABLET ORAL at 08:37

## 2020-01-11 RX ADMIN — SODIUM CHLORIDE, PRESERVATIVE FREE 10 ML: 5 INJECTION INTRAVENOUS at 08:37

## 2020-01-11 RX ADMIN — ONDANSETRON 4 MG: 2 INJECTION INTRAMUSCULAR; INTRAVENOUS at 12:44

## 2020-01-11 ASSESSMENT — PAIN DESCRIPTION - FREQUENCY: FREQUENCY: CONTINUOUS

## 2020-01-11 ASSESSMENT — PAIN SCALES - GENERAL
PAINLEVEL_OUTOF10: 0
PAINLEVEL_OUTOF10: 4

## 2020-01-11 ASSESSMENT — PAIN DESCRIPTION - PROGRESSION: CLINICAL_PROGRESSION: NOT CHANGED

## 2020-01-11 ASSESSMENT — PAIN DESCRIPTION - ONSET: ONSET: ON-GOING

## 2020-01-11 ASSESSMENT — PAIN - FUNCTIONAL ASSESSMENT: PAIN_FUNCTIONAL_ASSESSMENT: ACTIVITIES ARE NOT PREVENTED

## 2020-01-11 ASSESSMENT — PAIN DESCRIPTION - ORIENTATION: ORIENTATION: RIGHT

## 2020-01-11 ASSESSMENT — PAIN DESCRIPTION - LOCATION: LOCATION: BACK

## 2020-01-11 ASSESSMENT — PAIN DESCRIPTION - PAIN TYPE: TYPE: CHRONIC PAIN

## 2020-01-11 ASSESSMENT — PAIN DESCRIPTION - DESCRIPTORS: DESCRIPTORS: ACHING

## 2020-01-11 NOTE — DISCHARGE SUMMARY
are consolidative opacities in the right lower lobe. There are atherosclerotic calcifications of the thoracic aorta. There are consolidative opacities in the right lower lobe, concerning for pneumonia. Discharge Exam:    HEENT: NCAT,  PERRLA, No JVD  Heart:  RRR, no murmurs, gallops, or rubs. Lungs:  CTA bilaterally, no wheeze, rales or rhonchi  Abd: bowel sounds present, nontender, nondistended, no masses  Extrem:  No clubbing, cyanosis, or edema    Disposition: [facility]     Patient Condition at Discharge: stble     Patient Instructions:      Medication List      CONTINUE taking these medications    apixaban 2.5 MG Tabs tablet  Commonly known as:  ELIQUIS  Take 1 tablet by mouth 2 times daily     budesonide 0.5 MG/2ML nebulizer suspension  Commonly known as:  PULMICORT  Take 4 mLs by nebulization 2 times daily     docusate sodium 100 MG capsule  Commonly known as:  COLACE     formoterol 20 MCG/2ML nebulizer solution  Commonly known as:  PERFOROMIST  Take 2 mLs by nebulization 2 times daily     furosemide 20 MG tablet  Commonly known as:  LASIX     HYDROcodone-acetaminophen  MG per tablet  Commonly known as:  NORCO     isosorbide mononitrate 60 MG extended release tablet  Commonly known as:  IMDUR     levothyroxine 25 MCG tablet  Commonly known as:  SYNTHROID     lisinopril 5 MG tablet  Commonly known as:  PRINIVIL;ZESTRIL     magnesium 250 MG Tabs tablet  Commonly known as:  MAGNESIUM-OXIDE     metoprolol tartrate 25 MG tablet  Commonly known as:  LOPRESSOR  Take 1 tablet by mouth 2 times daily     miconazole 2 % powder  Commonly known as:  MICOTIN  Apply topically 2 times daily.      morphine 15 MG tablet  Commonly known as:  MSIR     nitroGLYCERIN 0.4 MG SL tablet  Commonly known as:  NITROSTAT     nystatin [id number] UNIT/ML suspension  Commonly known as:  MYCOSTATIN  Take 5 mLs by mouth 4 times daily     omeprazole 20 MG delayed release capsule  Commonly known as:  PRILOSEC     pravastatin 40 MG tablet  Commonly known as:  PRAVACHOL  Take 1 tablet by mouth nightly     therapeutic multivitamin-minerals tablet     traZODone 100 MG tablet  Commonly known as:  DESYREL          Activity: activity as tolerated  Diet: regular diet    Pt has been advised to: Follow-up with No primary care provider on file. in 1 week.   Follow-up with consultants as recommended by them    Note that over 30 minutes was spent in preparing discharge papers, discussing discharge with patient, medication review, etc.    Signed:  Kulwinder Schrader MD  1/11/2020  3:31 PM

## 2020-01-11 NOTE — PLAN OF CARE
Problem: Falls - Risk of:  Goal: Will remain free from falls  Description  Will remain free from falls  Outcome: Met This Shift     Problem: Risk for Impaired Skin Integrity  Goal: Tissue integrity - skin and mucous membranes  Description  Structural intactness and normal physiological function of skin and  mucous membranes. Outcome: Met This Shift     Problem: Pain:  Description  Pain management should include both nonpharmacologic and pharmacologic interventions.   Goal: Control of acute pain  Description  Control of acute pain  Outcome: Met This Shift

## 2020-01-11 NOTE — PROGRESS NOTES
[employer] arranged for 6pm. Daughter in room and aware.   Electronically signed by Vikki Zarate RN on 1/11/2020 at 3:50 PM

## 2020-01-11 NOTE — PROGRESS NOTES
Type and Reason for Visit: Initial, RD Nutrition Re-Screen(LOS Assessment, RD Re-Screen Negative)    Nutrition Screen:   · Have you recently lost weight without trying? - 0 to 1 pound (0 points)   · Have you been eating poorly because of a decreased appetite? - No (0 points)   · Malnutrition Screening Tool Score - 0    Dietitian Assessment of Nutrition Re-Screen: Pt assessed per LOS protocol. Chart reviewed. Pt currently eating ~75% of most meals and w/ no other significant nutritional issues noted at this time. Will Start Boost Pudding ONS to aid w/ intakes and will follow-up per policy. Please consult if RD needed.          Electronically signed by Frida Moreau RD, SAUNDRA on 1/11/20 at 10:39 AM    Contact Number: ext [phone]

## 2020-01-11 NOTE — PROGRESS NOTES
place.    Chair/bed alarm: chair alarm active    Treatment time: 27 minutes  Time out: 1018    Pt is making good progress toward established Physical Therapy goals as per increased functional mobility performed and exercise participation. Continue with physical therapy current plan of care.     Jose Mcdonald PTA   License Number: [id number]

## 2020-01-13 LAB
ALBUMIN SERPL-MCNC: 2.5 G/DL (ref 3.5–4.7)
ALPHA-1-GLOBULIN: 0.4 G/DL (ref 0.2–0.4)
ALPHA-2-GLOBULIN: 0.8 G/FL (ref 0.5–1)
BETA GLOBULIN: 0.6 G/DL (ref 0.8–1.3)
ELECTROPHORESIS: ABNORMAL
GAMMA GLOBULIN: 0.8 G/DL (ref 0.7–1.6)
IGA: 93 MG/DL (ref 70–400)
IGG: 727 MG/DL (ref 700–1600)
IGM: 27 MG/DL (ref 40–230)
IMMUNOFIXATION RESULT, SERUM: NORMAL
TOTAL PROTEIN: 5.1 G/DL (ref 6.4–8.3)

## 2020-01-14 PROBLEM — N39.0 UTI (URINARY TRACT INFECTION): Status: RESOLVED | Noted: 2019-12-15 | Resolved: 2020-01-14

## 2020-06-17 ENCOUNTER — HOSPITAL ENCOUNTER (OUTPATIENT)
Age: 85
Discharge: HOME | End: 2020-06-19
Payer: MEDICARE

## 2020-06-17 PROCEDURE — U0003 INFECTIOUS AGENT DETECTION BY NUCLEIC ACID (DNA OR RNA); SEVERE ACUTE RESPIRATORY SYNDROME CORONAVIRUS 2 (SARS-COV-2) (CORONAVIRUS DISEASE [COVID-19]), AMPLIFIED PROBE TECHNIQUE, MAKING USE OF HIGH THROUGHPUT TECHNOLOGIES AS DESCRIBED BY CMS-2020-01-R: HCPCS

## 2020-06-19 LAB
SARS-COV-2: NOT DETECTED
SOURCE: NORMAL

## 2020-08-13 ENCOUNTER — HOSPITAL ENCOUNTER (OUTPATIENT)
Age: 85
Discharge: HOME | End: 2020-08-15
Payer: MEDICARE

## 2020-08-13 PROCEDURE — U0003 INFECTIOUS AGENT DETECTION BY NUCLEIC ACID (DNA OR RNA); SEVERE ACUTE RESPIRATORY SYNDROME CORONAVIRUS 2 (SARS-COV-2) (CORONAVIRUS DISEASE [COVID-19]), AMPLIFIED PROBE TECHNIQUE, MAKING USE OF HIGH THROUGHPUT TECHNOLOGIES AS DESCRIBED BY CMS-2020-01-R: HCPCS

## 2020-08-15 LAB
SARS-COV-2: NOT DETECTED
SOURCE: NORMAL

## 2020-09-01 ENCOUNTER — HOSPITAL ENCOUNTER (OUTPATIENT)
Age: 85
Discharge: HOME | End: 2020-09-03
Payer: MEDICARE

## 2020-09-01 PROCEDURE — U0003 INFECTIOUS AGENT DETECTION BY NUCLEIC ACID (DNA OR RNA); SEVERE ACUTE RESPIRATORY SYNDROME CORONAVIRUS 2 (SARS-COV-2) (CORONAVIRUS DISEASE [COVID-19]), AMPLIFIED PROBE TECHNIQUE, MAKING USE OF HIGH THROUGHPUT TECHNOLOGIES AS DESCRIBED BY CMS-2020-01-R: HCPCS

## 2020-09-04 ENCOUNTER — HOSPITAL ENCOUNTER (OUTPATIENT)
Age: 85
Discharge: HOME | End: 2020-09-06
Payer: MEDICARE

## 2020-09-04 PROCEDURE — U0003 INFECTIOUS AGENT DETECTION BY NUCLEIC ACID (DNA OR RNA); SEVERE ACUTE RESPIRATORY SYNDROME CORONAVIRUS 2 (SARS-COV-2) (CORONAVIRUS DISEASE [COVID-19]), AMPLIFIED PROBE TECHNIQUE, MAKING USE OF HIGH THROUGHPUT TECHNOLOGIES AS DESCRIBED BY CMS-2020-01-R: HCPCS

## 2020-09-09 LAB
SARS-COV-2: DETECTED
SARS-COV-2: NOT DETECTED
SOURCE: ABNORMAL
SOURCE: NORMAL

## 2020-09-21 ENCOUNTER — HOSPITAL ENCOUNTER (OUTPATIENT)
Age: 85
Discharge: HOME | End: 2020-09-23
Payer: MEDICARE

## 2020-09-21 PROCEDURE — U0003 INFECTIOUS AGENT DETECTION BY NUCLEIC ACID (DNA OR RNA); SEVERE ACUTE RESPIRATORY SYNDROME CORONAVIRUS 2 (SARS-COV-2) (CORONAVIRUS DISEASE [COVID-19]), AMPLIFIED PROBE TECHNIQUE, MAKING USE OF HIGH THROUGHPUT TECHNOLOGIES AS DESCRIBED BY CMS-2020-01-R: HCPCS

## 2020-09-22 ENCOUNTER — HOSPITAL ENCOUNTER (OUTPATIENT)
Age: 85
Discharge: HOME | End: 2020-09-24
Payer: MEDICARE

## 2020-09-22 LAB
SARS-COV-2: DETECTED
SOURCE: ABNORMAL

## 2020-09-22 PROCEDURE — U0003 INFECTIOUS AGENT DETECTION BY NUCLEIC ACID (DNA OR RNA); SEVERE ACUTE RESPIRATORY SYNDROME CORONAVIRUS 2 (SARS-COV-2) (CORONAVIRUS DISEASE [COVID-19]), AMPLIFIED PROBE TECHNIQUE, MAKING USE OF HIGH THROUGHPUT TECHNOLOGIES AS DESCRIBED BY CMS-2020-01-R: HCPCS

## 2020-09-23 LAB
SARS-COV-2: NOT DETECTED
SOURCE: NORMAL

## 2020-09-25 ENCOUNTER — HOSPITAL ENCOUNTER (OUTPATIENT)
Age: 85
Discharge: HOME | End: 2020-09-27
Payer: MEDICARE

## 2020-09-25 PROCEDURE — U0003 INFECTIOUS AGENT DETECTION BY NUCLEIC ACID (DNA OR RNA); SEVERE ACUTE RESPIRATORY SYNDROME CORONAVIRUS 2 (SARS-COV-2) (CORONAVIRUS DISEASE [COVID-19]), AMPLIFIED PROBE TECHNIQUE, MAKING USE OF HIGH THROUGHPUT TECHNOLOGIES AS DESCRIBED BY CMS-2020-01-R: HCPCS

## 2020-09-26 LAB
SARS-COV-2: NOT DETECTED
SOURCE: NORMAL

## 2021-01-04 NOTE — PROGRESS NOTES
Nutrition Assessment    Type and Reason for Visit: Reassess    Nutrition Recommendations: Continue current diet, Continue current ONS    Malnutrition Assessment:  · Malnutrition Status: Meets the criteria for moderate malnutrition  · Context: Acute illness or injury  · Findings of the 6 clinical characteristics of malnutrition (Minimum of 2 out of 6 clinical characteristics is required to make the diagnosis of moderate or severe Protein Calorie Malnutrition based on AND/ASPEN Guidelines):  1. Energy Intake-Less than or equal to 75%, greater than or equal to 5 days    2. Weight Loss-Unable to assess,    3. Fat Loss-Moderate subcutaneous fat loss, Orbital  4. Muscle Loss-Moderate muscle mass loss, Temples (temporalis muscle), Clavicles (pectoralis and deltoids)  5. Fluid Accumulation-No significant fluid accumulation,    6.  Strength-Not measured    Nutrition Diagnosis:   · Problem:  Moderate malnutrition, in context of acute illness or injury  · Etiology: related to Alteration in GI function     Signs and symptoms:  as evidenced by Diet history of poor intake, Weight loss, Moderate loss of subcutaneous fat, Moderate muscle loss, GI abnormality, Nausea, Vomiting, Diarrhea    Nutrition Assessment:  · Subjective Assessment: Pt's family states ~10# wt loss over the past month  · Nutrition-Focused Physical Findings: abd distention/tenderness, constipation, active BS, +I/O's, no edema noted, cecal mass  · Wound Type:  (fissure noted to buttocks)  · Current Nutrition Therapies:  · Oral Diet Orders: Full Liquid   · Oral Diet intake: % (full liquids)  · Oral Nutrition Supplement (ONS) Orders: Standard High Calorie Oral Supplement (TID)  · Anthropometric Measures:  · Ht: 5' (152.4 cm)   · Current Body Wt: 123 lb (55.8 kg) (10/2 bed scale)  · Admission Body Wt: 126 lb (57.2 kg) (9/26 first actual)  · Usual Body Wt: 143 lb (64.9 kg) (11/2015 actual remote EMR hx)  · % Weight Change: wt fluctuations during admit w/ Pharmacy faxed in a request for prior authorization on:    Medication: Sildenafil Citrate   Dosage: 100MG Tablets  Quantity requested:  Does not state  Pharmacy for prescription has been selected. [facility] [address]     Prior authorization request has been initiated and sent for completion.     +fluid bal currently,     · Ideal Body Wt: 100 lb (45.4 kg), % Ideal Body 123%  · BMI Classification: BMI 18.5 - 24.9 Normal Weight  · Comparative Standards (Estimated Nutrition Needs):  · Estimated Daily Total Kcal: 9959-0805  · Estimated Daily Protein (g): 65-80    Nutrition Risk Level: Moderate    Nutrition Interventions:   Continue current diet, Continue current ONS  Continued Inpatient Monitoring, No recommendations at this time    Nutrition Evaluation:   · Evaluation: Goals set   · Goals: Pt to consume >75% meals/ONS, tolerate diet progression    · Monitoring: Diet Progression, Meal Intake, Supplement Intake, Diet Tolerance, Skin Integrity, Wound Healing, Fluid Balance, Weight, Comparative Standards, Pertinent Labs, Nausea or Vomiting, Diarrhea, Constipation    See Adult Nutrition Doc Flowsheet for more detail.      Electronically signed by Nicole Benitez MS, RD, LD on 10/3/18 at 3:45 PM    Contact Number: [phone]

## 2021-04-03 LAB
SARS-COV-2: NOT DETECTED
SOURCE: NORMAL

## 2021-04-04 LAB
SARS-COV-2: NOT DETECTED
SOURCE: NORMAL

## 2021-04-09 LAB
SARS-COV-2: NOT DETECTED
SOURCE: NORMAL

## 2021-04-13 LAB
SARS-COV-2: NOT DETECTED
SOURCE: NORMAL

## 2021-06-07 LAB
HCT VFR BLD CALC: 33.8 % (ref 34–48)
HEMOGLOBIN: 10.5 G/DL (ref 11.5–15.5)

## 2021-06-14 LAB
ALBUMIN SERPL-MCNC: 3.5 G/DL (ref 3.5–5.2)
ALP BLD-CCNC: 127 U/L (ref 35–104)
ALT SERPL-CCNC: 9 U/L (ref 0–32)
ANION GAP SERPL CALCULATED.3IONS-SCNC: 9 MMOL/L (ref 7–16)
AST SERPL-CCNC: 24 U/L (ref 0–31)
BASOPHILS ABSOLUTE: 0.04 E9/L (ref 0–0.2)
BASOPHILS RELATIVE PERCENT: 0.7 % (ref 0–2)
BILIRUB SERPL-MCNC: 0.3 MG/DL (ref 0–1.2)
BUN BLDV-MCNC: 34 MG/DL (ref 6–23)
CALCIUM SERPL-MCNC: 9.2 MG/DL (ref 8.6–10.2)
CHLORIDE BLD-SCNC: 105 MMOL/L (ref 98–107)
CO2: 27 MMOL/L (ref 22–29)
CREAT SERPL-MCNC: 1.2 MG/DL (ref 0.5–1)
EOSINOPHILS ABSOLUTE: 0.26 E9/L (ref 0.05–0.5)
EOSINOPHILS RELATIVE PERCENT: 4.4 % (ref 0–6)
GFR AFRICAN AMERICAN: 51
GFR NON-AFRICAN AMERICAN: 42 ML/MIN/1.73
GLUCOSE BLD-MCNC: 81 MG/DL (ref 74–99)
HCT VFR BLD CALC: 33.5 % (ref 34–48)
HEMOGLOBIN: 10.3 G/DL (ref 11.5–15.5)
IMMATURE GRANULOCYTES #: 0.03 E9/L
IMMATURE GRANULOCYTES %: 0.5 % (ref 0–5)
LYMPHOCYTES ABSOLUTE: 1.52 E9/L (ref 1.5–4)
LYMPHOCYTES RELATIVE PERCENT: 25.9 % (ref 20–42)
MCH RBC QN AUTO: 32.5 PG (ref 26–35)
MCHC RBC AUTO-ENTMCNC: 30.7 % (ref 32–34.5)
MCV RBC AUTO: 105.7 FL (ref 80–99.9)
MONOCYTES ABSOLUTE: 0.62 E9/L (ref 0.1–0.95)
MONOCYTES RELATIVE PERCENT: 10.5 % (ref 2–12)
NEUTROPHILS ABSOLUTE: 3.41 E9/L (ref 1.8–7.3)
NEUTROPHILS RELATIVE PERCENT: 58 % (ref 43–80)
PDW BLD-RTO: 15.2 FL (ref 11.5–15)
PLATELET # BLD: 143 E9/L (ref 130–450)
PMV BLD AUTO: 11.5 FL (ref 7–12)
POTASSIUM SERPL-SCNC: 4.2 MMOL/L (ref 3.5–5)
RBC # BLD: 3.17 E12/L (ref 3.5–5.5)
SODIUM BLD-SCNC: 141 MMOL/L (ref 132–146)
TOTAL PROTEIN: 7 G/DL (ref 6.4–8.3)
WBC # BLD: 5.9 E9/L (ref 4.5–11.5)

## 2021-06-18 NOTE — PROGRESS NOTES
"Discharge Summary    CHIEF COMPLAINT ON ADMISSION  Chief Complaint   Patient presents with   • Suicidal Ideation     Patient has been having thoughts \"for a while\" of not wanting to live anymore. Patient states \"she has a plan to take all her pills but she didn't have enough\". Pt has a hx of SI. Pt on legal hold upon arrival.    • Alcohol Intoxication     Per EMS, pts roomate says \"she has been lying on the floor drinking for the last 3 days\". Pt drinks 1-2 pints of vodka a day. Pt has drank \"a couple shots today\".       Reason for Admission  ems     Admission Date  5/24/2021    CODE STATUS  Prior    HPI & HOSPITAL COURSE  This is a 52 y.o. female with a past medical history of recurrent alcoholic pancreatitis, alcohol abuse and alcohol withdrawals including seizures, history of bipolar disorder, polysubstance use with methamphetamines and alcohol, prior suicide attempt, COPD on 2 L home oxygen, admitted 5/24/2021 with complaints of suicidal ideation due to her chronic alcohol use.  Patient was admitted found to be in acute alcohol intoxication, elevated POC breathalyzer 0.292.  Patient on admission was more somnolent but arousable with sternal rub.  Patient came in with a legal hold due to complaints of suicidal ideation, suicidal plan by taking overdose of pills she has at home.   Patient was admitted for further treatment and was started on CIWA protocol for alcohol with draws. Also psychiatry was consulted since patient was on legal hold   Patient was having abdominal pain during her stay here and also elevated  soGI was consulted and saw patient , her lipase was 167 so most likely pancreatitis and alcohol hepatitis. RUQ U/S indicated stable echogenic liver 2/2 steatosis, fatty liver disease 2/2 etoh abuse. Also a HIDA scan was done and which showed a low ejection fraction of gall bladder. Case was discussed with general surgery Dr. Chavez patient is not a candidate for any cholecystectomy due to her alcoholic " hepatitis on this admission, likely biliary dyskinesia, recommended low fat diet and outpt evaluation for any possible cholecystectomy as outpatient. Her abdominal pain has resolved.   Her alcohol with draws resolved. She was followed by psychiatry and her legal hold was discontinued.   Since her alcohol withdraws her CIWA score was discontinued.  Patient was seen by physical therapy and initially the recommendation was for placement.   Patient ws followed by physical therapy here and she had progressed and latter home health was recommended.   Patient now doing better, home health and home oxygen have been arranged for patient and she will be discharged home today,     The patient met 2-midnight criteria for an inpatient stay at the time of discharge.    Discharge Date  6/17/2021    FOLLOW UP ITEMS POST DISCHARGE  PCP  Surgery     DISCHARGE DIAGNOSES  Principal Problem:    Alcohol dependence with withdrawal (HCC) POA: Yes  Active Problems:    Hypokalemia POA: No    Depression with suicidal ideation POA: Yes    Alcohol-induced acute pancreatitis POA: Yes    COPD (chronic obstructive pulmonary disease) (HCC) (Chronic) POA: Yes    Leukocytopenia POA: Yes    Hypomagnesemia POA: Yes    Macrocytic anemia POA: Yes    Acute alcoholic hepatitis POA: Yes    Vitamin D deficiency (Chronic) POA: Yes    Acute on chronic respiratory failure with hypoxia on home O2 therapy POA: Yes  Resolved Problems:    * No resolved hospital problems. *      FOLLOW UP  No future appointments.  Roby Daniel, A.P.R.N.  [address] [facility] [address]  [facility] [id number] [phone]    Go on 6/21/2021  Please check in at 12pm for a follow up with primary care.     Sukh Fuller  [address] [facility] [address]  [place] [id number] [phone]    Go on 6/21/2021  Please attend a behavioral health appointment at 2:40pm.     [facility]  [address] [facility] [address]  [facility] [id number] [phone]        Armando Chavez M.D.  [address] [place] [address]  placed on linezolid  by ID. She was discharged on  back to nursing care on oral antibiotic with a prednisone taper for 5 days. She was continued on aerosols and discharged.     According to her daughter she was doing well until the day of admission when she apparently had a choking episode and appeared to be short of breath and was sent to the hospital for evaluation. She currently denies any chest pain shortness of breath cough or difficulty swallowing. No fever or chills reported. When she was [facility] from [facility] facility paramedics noted just that her pulse oximetry was 88% on room air but did improve with oxygen by nasal cannula. Facility also reported lethargy. Patient appears to be alert and oriented at this time.     Patient had a chest x-ray in the emergency room that was consistent with right lower lobe pneumonia and she also had a modest leukocytosis. She was also noted to have significant anemia. She was subsequently admitted for intravenous antibiotic therapy and further evaluation. Treated with iv cefepime and supportive care. She has improved and is back at her baseline. Has finished abx course. Ok for [employer] back to [place]. Home meds including oac have been resumed. Recent Labs     20  0251 20  0815   WBC 7.1  --    HGB 8.1*  --    HCT 25.6* 24.3*     --        Recent Labs     20  0251 01/10/20  0957 20  0815    137 134   K 3.7 3.0* 3.7    100 96*   CO2 22 26 29   BUN 10 9 10   CREATININE 0.7 0.9 1.0   CALCIUM 8.9 8.7 9.1       Xr Chest Portable    Result Date: 2020  Patient MRN: [id number] : 1928 Age:  80 years Gender: Female Order Date: 2020 10:45 AM Exam: XR CHEST PORTABLE Number of Images: 1 view Indication:   eval for pna eval for pna Comparison: Chest 2 view radiograph 2019 Findings: Stable cardiomegaly. There is a left cardiac pacer. There are consolidative opacities in the right lower lobe.  [address]  [facility] [address]  [phone]    Schedule an appointment as soon as possible for a visit        MEDICATIONS ON DISCHARGE     Medication List      START taking these medications      Instructions   oxyCODONE immediate-release 5 MG Tabs  Commonly known as: ROXICODONE   Take 0.5 Tablet by mouth every 6 hours as needed for up to 3 days.  Dose: 2.5 mg     promethazine 12.5 MG tablet  Commonly known as: PHENERGAN   Take 1 tablet by mouth every 6 hours as needed for Nausea/Vomiting (if no relief from ondansetron or if ondansetron is not ordered) for up to 3 days.  Dose: 12.5 mg        CONTINUE taking these medications      Instructions   famotidine 20 MG Tabs  Commonly known as: PEPCID   Take 1 tablet by mouth every day.  Dose: 20 mg     FLUoxetine 10 MG Caps  Commonly known as: PROZAC   Take 30 mg by mouth every day.  Dose: 30 mg     gabapentin 300 MG Caps  Commonly known as: NEURONTIN   Take 300 mg by mouth 3 times a day.  Dose: 300 mg     ondansetron 4 MG Tbdp  Commonly known as: ZOFRAN ODT   Take 1 tablet by mouth every 8 hours as needed for Nausea.  Dose: 4 mg            Allergies  Allergies   Allergen Reactions   • Bactrim Hives   • Lamotrigine [Lamictal] Hives   • Quetiapine Fumarate Hives     Extrapyramidal Symptoms   • Keflex Hives       DIET  No orders of the defined types were placed in this encounter.      ACTIVITY  As tolerated.  Weight bearing as tolerated    CONSULTATIONS  Psychiatry  GI   Surgery     PROCEDURES  None     LABORATORY  Lab Results   Component Value Date    SODIUM 136 06/17/2021    POTASSIUM 3.8 06/17/2021    CHLORIDE 100 06/17/2021    CO2 26 06/17/2021    GLUCOSE 113 (H) 06/17/2021    BUN 12 06/17/2021    CREATININE 0.45 (L) 06/17/2021    CREATININE 1.0 02/06/2009        Lab Results   Component Value Date    WBC 7.1 06/17/2021    HEMOGLOBIN 11.9 (L) 06/17/2021    HEMATOCRIT 36.5 (L) 06/17/2021    PLATELETCT 225 06/17/2021        Total time of the discharge process exceeds 41  are atherosclerotic calcifications of the thoracic aorta. There are consolidative opacities in the right lower lobe, concerning for pneumonia. Discharge Exam:    HEENT: NCAT,  PERRLA, No JVD  Heart:  RRR, no murmurs, gallops, or rubs. Lungs:  CTA bilaterally, no wheeze, rales or rhonchi  Abd: bowel sounds present, nontender, nondistended, no masses  Extrem:  No clubbing, cyanosis, or edema    Disposition: [facility]     Patient Condition at Discharge: stble     Patient Instructions:      Medication List      ASK your doctor about these medications    apixaban 2.5 MG Tabs tablet  Commonly known as:  ELIQUIS  Take 1 tablet by mouth 2 times daily     budesonide 0.5 MG/2ML nebulizer suspension  Commonly known as:  PULMICORT  Take 4 mLs by nebulization 2 times daily     docusate sodium 100 MG capsule  Commonly known as:  COLACE     formoterol 20 MCG/2ML nebulizer solution  Commonly known as:  PERFOROMIST  Take 2 mLs by nebulization 2 times daily     furosemide 20 MG tablet  Commonly known as:  LASIX     HYDROcodone-acetaminophen  MG per tablet  Commonly known as:  NORCO     isosorbide mononitrate 60 MG extended release tablet  Commonly known as:  IMDUR     levothyroxine 25 MCG tablet  Commonly known as:  SYNTHROID     lisinopril 5 MG tablet  Commonly known as:  PRINIVIL;ZESTRIL     magnesium 250 MG Tabs tablet  Commonly known as:  MAGNESIUM-OXIDE     metoprolol tartrate 25 MG tablet  Commonly known as:  LOPRESSOR  Take 1 tablet by mouth 2 times daily     miconazole 2 % powder  Commonly known as:  MICOTIN  Apply topically 2 times daily.      morphine 15 MG tablet  Commonly known as:  MSIR     nitroGLYCERIN 0.4 MG SL tablet  Commonly known as:  NITROSTAT     nystatin [id number] UNIT/ML suspension  Commonly known as:  MYCOSTATIN  Take 5 mLs by mouth 4 times daily     omeprazole 20 MG delayed release capsule  Commonly known as:  PRILOSEC     pravastatin 40 MG tablet  Commonly known as:  PRAVACHOL  Take 1 tablet by mouth minutes.   nightly     therapeutic multivitamin-minerals tablet     traZODone 100 MG tablet  Commonly known as:  DESYREL          Activity: activity as tolerated  Diet: regular diet    Pt has been advised to: Follow-up with No primary care provider on file. in 1 week.   Follow-up with consultants as recommended by them    Note that over 30 minutes was spent in preparing discharge papers, discussing discharge with patient, medication review, etc.    Signed:  Natacha Diaz MD  1/11/2020  1:17 PM

## 2021-06-21 LAB
HCT VFR BLD CALC: 30.2 % (ref 34–48)
HEMOGLOBIN: 9.3 G/DL (ref 11.5–15.5)

## 2021-06-28 LAB
HCT VFR BLD CALC: 34.9 % (ref 34–48)
HEMOGLOBIN: 10.7 G/DL (ref 11.5–15.5)

## 2021-07-06 LAB
HCT VFR BLD CALC: 33.5 % (ref 34–48)
HEMOGLOBIN: 10.2 G/DL (ref 11.5–15.5)

## 2021-07-12 LAB
ALBUMIN SERPL-MCNC: 3.7 G/DL (ref 3.5–5.2)
ALP BLD-CCNC: 128 U/L (ref 35–104)
ALT SERPL-CCNC: 12 U/L (ref 0–32)
ANION GAP SERPL CALCULATED.3IONS-SCNC: 10 MMOL/L (ref 7–16)
AST SERPL-CCNC: 20 U/L (ref 0–31)
BASOPHILS ABSOLUTE: 0.05 E9/L (ref 0–0.2)
BASOPHILS RELATIVE PERCENT: 0.7 % (ref 0–2)
BILIRUB SERPL-MCNC: 0.4 MG/DL (ref 0–1.2)
BUN BLDV-MCNC: 40 MG/DL (ref 6–23)
CALCIUM SERPL-MCNC: 9.3 MG/DL (ref 8.6–10.2)
CHLORIDE BLD-SCNC: 100 MMOL/L (ref 98–107)
CHOLESTEROL, TOTAL: 114 MG/DL (ref 0–199)
CO2: 32 MMOL/L (ref 22–29)
CREAT SERPL-MCNC: 1.1 MG/DL (ref 0.5–1)
EOSINOPHILS ABSOLUTE: 0.24 E9/L (ref 0.05–0.5)
EOSINOPHILS RELATIVE PERCENT: 3.3 % (ref 0–6)
GFR AFRICAN AMERICAN: 56
GFR NON-AFRICAN AMERICAN: 46 ML/MIN/1.73
GLUCOSE BLD-MCNC: 88 MG/DL (ref 74–99)
HCT VFR BLD CALC: 34.2 % (ref 34–48)
HDLC SERPL-MCNC: 44 MG/DL
HEMOGLOBIN: 10.5 G/DL (ref 11.5–15.5)
IMMATURE GRANULOCYTES #: 0.04 E9/L
IMMATURE GRANULOCYTES %: 0.6 % (ref 0–5)
LDL CHOLESTEROL CALCULATED: 53 MG/DL (ref 0–99)
LYMPHOCYTES ABSOLUTE: 1.58 E9/L (ref 1.5–4)
LYMPHOCYTES RELATIVE PERCENT: 22 % (ref 20–42)
MCH RBC QN AUTO: 32.1 PG (ref 26–35)
MCHC RBC AUTO-ENTMCNC: 30.7 % (ref 32–34.5)
MCV RBC AUTO: 104.6 FL (ref 80–99.9)
MONOCYTES ABSOLUTE: 0.69 E9/L (ref 0.1–0.95)
MONOCYTES RELATIVE PERCENT: 9.6 % (ref 2–12)
NEUTROPHILS ABSOLUTE: 4.57 E9/L (ref 1.8–7.3)
NEUTROPHILS RELATIVE PERCENT: 63.8 % (ref 43–80)
PDW BLD-RTO: 14.3 FL (ref 11.5–15)
PLATELET # BLD: 142 E9/L (ref 130–450)
PMV BLD AUTO: 12 FL (ref 7–12)
POTASSIUM SERPL-SCNC: 3.5 MMOL/L (ref 3.5–5)
RBC # BLD: 3.27 E12/L (ref 3.5–5.5)
SODIUM BLD-SCNC: 142 MMOL/L (ref 132–146)
TOTAL PROTEIN: 7.1 G/DL (ref 6.4–8.3)
TRIGL SERPL-MCNC: 87 MG/DL (ref 0–149)
TSH SERPL DL<=0.05 MIU/L-ACNC: 3.66 UIU/ML (ref 0.27–4.2)
VLDLC SERPL CALC-MCNC: 17 MG/DL
WBC # BLD: 7.2 E9/L (ref 4.5–11.5)

## 2021-07-19 LAB
HCT VFR BLD CALC: 32 % (ref 34–48)
HEMOGLOBIN: 10 G/DL (ref 11.5–15.5)

## 2021-07-26 LAB
ANION GAP SERPL CALCULATED.3IONS-SCNC: 9 MMOL/L (ref 7–16)
BUN BLDV-MCNC: 35 MG/DL (ref 6–23)
CALCIUM SERPL-MCNC: 8.7 MG/DL (ref 8.6–10.2)
CHLORIDE BLD-SCNC: 95 MMOL/L (ref 98–107)
CO2: 32 MMOL/L (ref 22–29)
CREAT SERPL-MCNC: 1.3 MG/DL (ref 0.5–1)
GFR AFRICAN AMERICAN: 46
GFR NON-AFRICAN AMERICAN: 38 ML/MIN/1.73
GLUCOSE BLD-MCNC: 91 MG/DL (ref 74–99)
HCT VFR BLD CALC: 28 % (ref 34–48)
HEMOGLOBIN: 8.8 G/DL (ref 11.5–15.5)
POTASSIUM SERPL-SCNC: 3.1 MMOL/L (ref 3.5–5)
SODIUM BLD-SCNC: 136 MMOL/L (ref 132–146)

## 2021-08-02 LAB
HCT VFR BLD CALC: 33.6 % (ref 34–48)
HEMOGLOBIN: 10.5 G/DL (ref 11.5–15.5)

## 2021-08-09 LAB
ANION GAP SERPL CALCULATED.3IONS-SCNC: 11 MMOL/L (ref 7–16)
BASOPHILS ABSOLUTE: 0.04 E9/L (ref 0–0.2)
BASOPHILS RELATIVE PERCENT: 0.6 % (ref 0–2)
BUN BLDV-MCNC: 25 MG/DL (ref 6–23)
CALCIUM SERPL-MCNC: 9.7 MG/DL (ref 8.6–10.2)
CHLORIDE BLD-SCNC: 101 MMOL/L (ref 98–107)
CO2: 30 MMOL/L (ref 22–29)
CREAT SERPL-MCNC: 1.1 MG/DL (ref 0.5–1)
EOSINOPHILS ABSOLUTE: 0.26 E9/L (ref 0.05–0.5)
EOSINOPHILS RELATIVE PERCENT: 3.7 % (ref 0–6)
GFR AFRICAN AMERICAN: 56
GFR NON-AFRICAN AMERICAN: 46 ML/MIN/1.73
GLUCOSE BLD-MCNC: 73 MG/DL (ref 74–99)
HCT VFR BLD CALC: 35.7 % (ref 34–48)
HEMOGLOBIN: 10.7 G/DL (ref 11.5–15.5)
IMMATURE GRANULOCYTES #: 0.02 E9/L
IMMATURE GRANULOCYTES %: 0.3 % (ref 0–5)
LYMPHOCYTES ABSOLUTE: 1.47 E9/L (ref 1.5–4)
LYMPHOCYTES RELATIVE PERCENT: 21 % (ref 20–42)
MCH RBC QN AUTO: 31.2 PG (ref 26–35)
MCHC RBC AUTO-ENTMCNC: 30 % (ref 32–34.5)
MCV RBC AUTO: 104.1 FL (ref 80–99.9)
MONOCYTES ABSOLUTE: 0.62 E9/L (ref 0.1–0.95)
MONOCYTES RELATIVE PERCENT: 8.9 % (ref 2–12)
NEUTROPHILS ABSOLUTE: 4.58 E9/L (ref 1.8–7.3)
NEUTROPHILS RELATIVE PERCENT: 65.5 % (ref 43–80)
PDW BLD-RTO: 14.3 FL (ref 11.5–15)
PLATELET # BLD: 160 E9/L (ref 130–450)
PMV BLD AUTO: 11.5 FL (ref 7–12)
POTASSIUM SERPL-SCNC: 3.6 MMOL/L (ref 3.5–5)
RBC # BLD: 3.43 E12/L (ref 3.5–5.5)
SODIUM BLD-SCNC: 142 MMOL/L (ref 132–146)
WBC # BLD: 7 E9/L (ref 4.5–11.5)

## 2021-08-16 LAB
HCT VFR BLD CALC: 31 % (ref 34–48)
HEMOGLOBIN: 9.8 G/DL (ref 11.5–15.5)

## 2021-08-23 LAB
ANION GAP SERPL CALCULATED.3IONS-SCNC: 8 MMOL/L (ref 7–16)
BUN BLDV-MCNC: 27 MG/DL (ref 6–23)
CALCIUM SERPL-MCNC: 9.4 MG/DL (ref 8.6–10.2)
CHLORIDE BLD-SCNC: 102 MMOL/L (ref 98–107)
CO2: 33 MMOL/L (ref 22–29)
CREAT SERPL-MCNC: 1.1 MG/DL (ref 0.5–1)
GFR AFRICAN AMERICAN: 56
GFR NON-AFRICAN AMERICAN: 46 ML/MIN/1.73
GLUCOSE BLD-MCNC: 89 MG/DL (ref 74–99)
HCT VFR BLD CALC: 32.8 % (ref 34–48)
HEMOGLOBIN: 10.3 G/DL (ref 11.5–15.5)
POTASSIUM SERPL-SCNC: 3.6 MMOL/L (ref 3.5–5)
SODIUM BLD-SCNC: 143 MMOL/L (ref 132–146)

## 2021-08-30 LAB
HCT VFR BLD CALC: 32.9 % (ref 34–48)
HEMOGLOBIN: 10.2 G/DL (ref 11.5–15.5)

## 2021-09-13 LAB
ALBUMIN SERPL-MCNC: 3.7 G/DL (ref 3.5–5.2)
ALP BLD-CCNC: 97 U/L (ref 35–104)
ALT SERPL-CCNC: 8 U/L (ref 0–32)
ANION GAP SERPL CALCULATED.3IONS-SCNC: 7 MMOL/L (ref 7–16)
AST SERPL-CCNC: 20 U/L (ref 0–31)
BASOPHILS ABSOLUTE: 0.04 E9/L (ref 0–0.2)
BASOPHILS RELATIVE PERCENT: 0.6 % (ref 0–2)
BILIRUB SERPL-MCNC: 0.4 MG/DL (ref 0–1.2)
BUN BLDV-MCNC: 31 MG/DL (ref 6–23)
CALCIUM SERPL-MCNC: 9.5 MG/DL (ref 8.6–10.2)
CHLORIDE BLD-SCNC: 102 MMOL/L (ref 98–107)
CO2: 33 MMOL/L (ref 22–29)
CREAT SERPL-MCNC: 1.1 MG/DL (ref 0.5–1)
EOSINOPHILS ABSOLUTE: 0.3 E9/L (ref 0.05–0.5)
EOSINOPHILS RELATIVE PERCENT: 4.3 % (ref 0–6)
GFR AFRICAN AMERICAN: 56
GFR NON-AFRICAN AMERICAN: 46 ML/MIN/1.73
GLUCOSE BLD-MCNC: 85 MG/DL (ref 74–99)
HCT VFR BLD CALC: 32.3 % (ref 34–48)
HEMOGLOBIN: 10.1 G/DL (ref 11.5–15.5)
IMMATURE GRANULOCYTES #: 0.03 E9/L
IMMATURE GRANULOCYTES %: 0.4 % (ref 0–5)
LYMPHOCYTES ABSOLUTE: 2.06 E9/L (ref 1.5–4)
LYMPHOCYTES RELATIVE PERCENT: 29.7 % (ref 20–42)
MCH RBC QN AUTO: 30.9 PG (ref 26–35)
MCHC RBC AUTO-ENTMCNC: 31.3 % (ref 32–34.5)
MCV RBC AUTO: 98.8 FL (ref 80–99.9)
MONOCYTES ABSOLUTE: 0.7 E9/L (ref 0.1–0.95)
MONOCYTES RELATIVE PERCENT: 10.1 % (ref 2–12)
NEUTROPHILS ABSOLUTE: 3.81 E9/L (ref 1.8–7.3)
NEUTROPHILS RELATIVE PERCENT: 54.9 % (ref 43–80)
PDW BLD-RTO: 14.3 FL (ref 11.5–15)
PLATELET # BLD: 152 E9/L (ref 130–450)
PMV BLD AUTO: 11.6 FL (ref 7–12)
POTASSIUM SERPL-SCNC: 3.5 MMOL/L (ref 3.5–5)
RBC # BLD: 3.27 E12/L (ref 3.5–5.5)
SODIUM BLD-SCNC: 142 MMOL/L (ref 132–146)
TOTAL PROTEIN: 7.1 G/DL (ref 6.4–8.3)
WBC # BLD: 6.9 E9/L (ref 4.5–11.5)

## 2021-09-24 LAB
SARS-COV-2: NOT DETECTED
SOURCE: NORMAL

## 2021-10-01 LAB
SARS-COV-2: NOT DETECTED
SOURCE: NORMAL

## 2021-10-09 LAB
SARS-COV-2: NOT DETECTED
SOURCE: NORMAL

## 2021-10-11 LAB
ANION GAP SERPL CALCULATED.3IONS-SCNC: 13 MMOL/L (ref 7–16)
BASOPHILS ABSOLUTE: 0.03 E9/L (ref 0–0.2)
BASOPHILS RELATIVE PERCENT: 0.4 % (ref 0–2)
BUN BLDV-MCNC: 27 MG/DL (ref 6–23)
CALCIUM SERPL-MCNC: 9.6 MG/DL (ref 8.6–10.2)
CHLORIDE BLD-SCNC: 95 MMOL/L (ref 98–107)
CO2: 31 MMOL/L (ref 22–29)
CREAT SERPL-MCNC: 1.1 MG/DL (ref 0.5–1)
EOSINOPHILS ABSOLUTE: 0.25 E9/L (ref 0.05–0.5)
EOSINOPHILS RELATIVE PERCENT: 3.5 % (ref 0–6)
GFR AFRICAN AMERICAN: 56
GFR NON-AFRICAN AMERICAN: 46 ML/MIN/1.73
GLUCOSE BLD-MCNC: 88 MG/DL (ref 74–99)
HCT VFR BLD CALC: 33.5 % (ref 34–48)
HEMOGLOBIN: 10.5 G/DL (ref 11.5–15.5)
IMMATURE GRANULOCYTES #: 0.04 E9/L
IMMATURE GRANULOCYTES %: 0.6 % (ref 0–5)
LYMPHOCYTES ABSOLUTE: 1.7 E9/L (ref 1.5–4)
LYMPHOCYTES RELATIVE PERCENT: 23.6 % (ref 20–42)
MCH RBC QN AUTO: 30.8 PG (ref 26–35)
MCHC RBC AUTO-ENTMCNC: 31.3 % (ref 32–34.5)
MCV RBC AUTO: 98.2 FL (ref 80–99.9)
MONOCYTES ABSOLUTE: 0.58 E9/L (ref 0.1–0.95)
MONOCYTES RELATIVE PERCENT: 8 % (ref 2–12)
NEUTROPHILS ABSOLUTE: 4.61 E9/L (ref 1.8–7.3)
NEUTROPHILS RELATIVE PERCENT: 63.9 % (ref 43–80)
PDW BLD-RTO: 15.9 FL (ref 11.5–15)
PLATELET # BLD: 186 E9/L (ref 130–450)
PMV BLD AUTO: 11 FL (ref 7–12)
POTASSIUM SERPL-SCNC: 3.4 MMOL/L (ref 3.5–5)
RBC # BLD: 3.41 E12/L (ref 3.5–5.5)
SODIUM BLD-SCNC: 139 MMOL/L (ref 132–146)
WBC # BLD: 7.2 E9/L (ref 4.5–11.5)

## 2021-10-17 LAB
SARS-COV-2: NOT DETECTED
SOURCE: NORMAL

## 2021-11-05 LAB
BACTERIA: ABNORMAL /HPF
BILIRUBIN URINE: NEGATIVE
BLOOD, URINE: ABNORMAL
CLARITY: CLEAR
COLOR: YELLOW
GLUCOSE URINE: NEGATIVE MG/DL
KETONES, URINE: NEGATIVE MG/DL
LEUKOCYTE ESTERASE, URINE: ABNORMAL
NITRITE, URINE: NEGATIVE
PH UA: 5 (ref 5–9)
PROTEIN UA: NEGATIVE MG/DL
RBC UA: ABNORMAL /HPF (ref 0–2)
SPECIFIC GRAVITY UA: 1.02 (ref 1–1.03)
UROBILINOGEN, URINE: 0.2 E.U./DL
WBC UA: ABNORMAL /HPF (ref 0–5)

## 2021-11-06 LAB — URINE CULTURE, ROUTINE: NORMAL

## 2021-11-12 LAB
BACTERIA: ABNORMAL /HPF
BILIRUBIN URINE: NEGATIVE
BLOOD, URINE: ABNORMAL
CLARITY: ABNORMAL
COLOR: YELLOW
GLUCOSE URINE: NEGATIVE MG/DL
KETONES, URINE: NEGATIVE MG/DL
LEUKOCYTE ESTERASE, URINE: ABNORMAL
NITRITE, URINE: POSITIVE
PH UA: 6 (ref 5–9)
PROTEIN UA: NEGATIVE MG/DL
RBC UA: ABNORMAL /HPF (ref 0–2)
SPECIFIC GRAVITY UA: 1.01 (ref 1–1.03)
UROBILINOGEN, URINE: 0.2 E.U./DL
WBC UA: ABNORMAL /HPF (ref 0–5)

## 2021-11-14 LAB — URINE CULTURE, ROUTINE: NORMAL

## 2021-12-18 ENCOUNTER — APPOINTMENT (OUTPATIENT)
Dept: GENERAL RADIOLOGY | Age: 86
DRG: 690 | End: 2021-12-18
Payer: MEDICARE

## 2021-12-18 ENCOUNTER — HOSPITAL ENCOUNTER (INPATIENT)
Age: 86
LOS: 2 days | Discharge: NURSING, REHAB OR LONG-TERM CARE | DRG: 690 | End: 2021-12-20
Attending: EMERGENCY MEDICINE | Admitting: INTERNAL MEDICINE
Payer: MEDICARE

## 2021-12-18 ENCOUNTER — APPOINTMENT (OUTPATIENT)
Dept: CT IMAGING | Age: 86
DRG: 690 | End: 2021-12-18
Payer: MEDICARE

## 2021-12-18 DIAGNOSIS — R41.82 ALTERED MENTAL STATUS, UNSPECIFIED ALTERED MENTAL STATUS TYPE: Primary | ICD-10-CM

## 2021-12-18 DIAGNOSIS — N30.01 ACUTE CYSTITIS WITH HEMATURIA: ICD-10-CM

## 2021-12-18 PROBLEM — R77.8 ELEVATED TROPONIN: Status: ACTIVE | Noted: 2021-12-18

## 2021-12-18 PROBLEM — I50.9 CHRONIC CONGESTIVE HEART FAILURE (HCC): Status: ACTIVE | Noted: 2021-01-01

## 2021-12-18 LAB
ALBUMIN SERPL-MCNC: 3.7 G/DL (ref 3.5–5.2)
ALP BLD-CCNC: 119 U/L (ref 35–104)
ALT SERPL-CCNC: 13 U/L (ref 0–32)
ANION GAP SERPL CALCULATED.3IONS-SCNC: 10 MMOL/L (ref 7–16)
AST SERPL-CCNC: 27 U/L (ref 0–31)
BACTERIA: ABNORMAL /HPF
BASOPHILS ABSOLUTE: 0.04 E9/L (ref 0–0.2)
BASOPHILS RELATIVE PERCENT: 0.7 % (ref 0–2)
BILIRUB SERPL-MCNC: 0.4 MG/DL (ref 0–1.2)
BILIRUBIN URINE: NEGATIVE
BLOOD, URINE: ABNORMAL
BUN BLDV-MCNC: 25 MG/DL (ref 6–23)
CALCIUM SERPL-MCNC: 8.9 MG/DL (ref 8.6–10.2)
CHLORIDE BLD-SCNC: 104 MMOL/L (ref 98–107)
CLARITY: CLEAR
CO2: 25 MMOL/L (ref 22–29)
COLOR: YELLOW
CREAT SERPL-MCNC: 1 MG/DL (ref 0.5–1)
EOSINOPHILS ABSOLUTE: 0.19 E9/L (ref 0.05–0.5)
EOSINOPHILS RELATIVE PERCENT: 3.2 % (ref 0–6)
EPITHELIAL CELLS, UA: ABNORMAL /HPF
GFR AFRICAN AMERICAN: >60
GFR NON-AFRICAN AMERICAN: 52 ML/MIN/1.73
GLUCOSE BLD-MCNC: 106 MG/DL (ref 74–99)
GLUCOSE URINE: NEGATIVE MG/DL
HCT VFR BLD CALC: 34.8 % (ref 34–48)
HEMOGLOBIN: 10.6 G/DL (ref 11.5–15.5)
IMMATURE GRANULOCYTES #: 0.02 E9/L
IMMATURE GRANULOCYTES %: 0.3 % (ref 0–5)
KETONES, URINE: NEGATIVE MG/DL
LEUKOCYTE ESTERASE, URINE: ABNORMAL
LYMPHOCYTES ABSOLUTE: 1.51 E9/L (ref 1.5–4)
LYMPHOCYTES RELATIVE PERCENT: 25.7 % (ref 20–42)
MCH RBC QN AUTO: 30.3 PG (ref 26–35)
MCHC RBC AUTO-ENTMCNC: 30.5 % (ref 32–34.5)
MCV RBC AUTO: 99.4 FL (ref 80–99.9)
MONOCYTES ABSOLUTE: 0.57 E9/L (ref 0.1–0.95)
MONOCYTES RELATIVE PERCENT: 9.7 % (ref 2–12)
NEUTROPHILS ABSOLUTE: 3.55 E9/L (ref 1.8–7.3)
NEUTROPHILS RELATIVE PERCENT: 60.4 % (ref 43–80)
NITRITE, URINE: NEGATIVE
PDW BLD-RTO: 15.1 FL (ref 11.5–15)
PH UA: 5.5 (ref 5–9)
PLATELET # BLD: 117 E9/L (ref 130–450)
PMV BLD AUTO: 11.5 FL (ref 7–12)
POTASSIUM REFLEX MAGNESIUM: 4 MMOL/L (ref 3.5–5)
PROTEIN UA: NEGATIVE MG/DL
RBC # BLD: 3.5 E12/L (ref 3.5–5.5)
RBC UA: ABNORMAL /HPF (ref 0–2)
REASON FOR REJECTION: NORMAL
REASON FOR REJECTION: NORMAL
REJECTED TEST: NORMAL
REJECTED TEST: NORMAL
SODIUM BLD-SCNC: 139 MMOL/L (ref 132–146)
SPECIFIC GRAVITY UA: 1.02 (ref 1–1.03)
TOTAL PROTEIN: 6.8 G/DL (ref 6.4–8.3)
TROPONIN, HIGH SENSITIVITY: 26 NG/L (ref 0–9)
UROBILINOGEN, URINE: 0.2 E.U./DL
WBC # BLD: 5.9 E9/L (ref 4.5–11.5)
WBC UA: ABNORMAL /HPF (ref 0–5)

## 2021-12-18 PROCEDURE — 81001 URINALYSIS AUTO W/SCOPE: CPT

## 2021-12-18 PROCEDURE — 70450 CT HEAD/BRAIN W/O DYE: CPT

## 2021-12-18 PROCEDURE — 93005 ELECTROCARDIOGRAM TRACING: CPT | Performed by: STUDENT IN AN ORGANIZED HEALTH CARE EDUCATION/TRAINING PROGRAM

## 2021-12-18 PROCEDURE — 87186 SC STD MICRODIL/AGAR DIL: CPT

## 2021-12-18 PROCEDURE — 2580000003 HC RX 258: Performed by: STUDENT IN AN ORGANIZED HEALTH CARE EDUCATION/TRAINING PROGRAM

## 2021-12-18 PROCEDURE — 84484 ASSAY OF TROPONIN QUANT: CPT

## 2021-12-18 PROCEDURE — 87088 URINE BACTERIA CULTURE: CPT

## 2021-12-18 PROCEDURE — 85025 COMPLETE CBC W/AUTO DIFF WBC: CPT

## 2021-12-18 PROCEDURE — 80053 COMPREHEN METABOLIC PANEL: CPT

## 2021-12-18 PROCEDURE — 1200000000 HC SEMI PRIVATE

## 2021-12-18 PROCEDURE — 87077 CULTURE AEROBIC IDENTIFY: CPT

## 2021-12-18 PROCEDURE — 99284 EMERGENCY DEPT VISIT MOD MDM: CPT

## 2021-12-18 PROCEDURE — 71045 X-RAY EXAM CHEST 1 VIEW: CPT

## 2021-12-18 PROCEDURE — 6360000002 HC RX W HCPCS: Performed by: STUDENT IN AN ORGANIZED HEALTH CARE EDUCATION/TRAINING PROGRAM

## 2021-12-18 RX ORDER — LEVOTHYROXINE SODIUM 0.03 MG/1
25 TABLET ORAL DAILY
Status: DISCONTINUED | OUTPATIENT
Start: 2021-12-19 | End: 2021-12-20 | Stop reason: HOSPADM

## 2021-12-18 RX ORDER — M-VIT,TX,IRON,MINS/CALC/FOLIC 27MG-0.4MG
1 TABLET ORAL
Status: DISCONTINUED | OUTPATIENT
Start: 2021-12-19 | End: 2021-12-20 | Stop reason: HOSPADM

## 2021-12-18 RX ORDER — SODIUM CHLORIDE 0.9 % (FLUSH) 0.9 %
5-40 SYRINGE (ML) INJECTION EVERY 12 HOURS SCHEDULED
Status: DISCONTINUED | OUTPATIENT
Start: 2021-12-18 | End: 2021-12-20 | Stop reason: HOSPADM

## 2021-12-18 RX ORDER — ARFORMOTEROL TARTRATE 15 UG/2ML
15 SOLUTION RESPIRATORY (INHALATION) 2 TIMES DAILY
Status: DISCONTINUED | OUTPATIENT
Start: 2021-12-18 | End: 2021-12-20 | Stop reason: HOSPADM

## 2021-12-18 RX ORDER — DOCUSATE SODIUM 100 MG/1
100 CAPSULE, LIQUID FILLED ORAL 2 TIMES DAILY PRN
Status: DISCONTINUED | OUTPATIENT
Start: 2021-12-18 | End: 2021-12-20 | Stop reason: HOSPADM

## 2021-12-18 RX ORDER — PROCHLORPERAZINE EDISYLATE 5 MG/ML
5 INJECTION INTRAMUSCULAR; INTRAVENOUS EVERY 6 HOURS PRN
Status: DISCONTINUED | OUTPATIENT
Start: 2021-12-18 | End: 2021-12-20 | Stop reason: HOSPADM

## 2021-12-18 RX ORDER — BUDESONIDE 0.5 MG/2ML
1000 INHALANT ORAL 2 TIMES DAILY
Status: DISCONTINUED | OUTPATIENT
Start: 2021-12-18 | End: 2021-12-20 | Stop reason: HOSPADM

## 2021-12-18 RX ORDER — ISOSORBIDE MONONITRATE 60 MG/1
60 TABLET, EXTENDED RELEASE ORAL DAILY
Status: DISCONTINUED | OUTPATIENT
Start: 2021-12-19 | End: 2021-12-20 | Stop reason: HOSPADM

## 2021-12-18 RX ORDER — PRAVASTATIN SODIUM 20 MG
40 TABLET ORAL NIGHTLY
Status: DISCONTINUED | OUTPATIENT
Start: 2021-12-18 | End: 2021-12-20 | Stop reason: HOSPADM

## 2021-12-18 RX ORDER — FORMOTEROL FUMARATE 20 UG/2ML
20 SOLUTION RESPIRATORY (INHALATION) 2 TIMES DAILY
Status: DISCONTINUED | OUTPATIENT
Start: 2021-12-18 | End: 2021-12-18 | Stop reason: CLARIF

## 2021-12-18 RX ORDER — NITROGLYCERIN 0.4 MG/1
0.4 TABLET SUBLINGUAL EVERY 5 MIN PRN
Status: DISCONTINUED | OUTPATIENT
Start: 2021-12-18 | End: 2021-12-20 | Stop reason: HOSPADM

## 2021-12-18 RX ORDER — SODIUM CHLORIDE 9 MG/ML
25 INJECTION, SOLUTION INTRAVENOUS PRN
Status: DISCONTINUED | OUTPATIENT
Start: 2021-12-18 | End: 2021-12-20 | Stop reason: HOSPADM

## 2021-12-18 RX ORDER — LISINOPRIL 5 MG/1
5 TABLET ORAL DAILY
Status: DISCONTINUED | OUTPATIENT
Start: 2021-12-19 | End: 2021-12-20 | Stop reason: HOSPADM

## 2021-12-18 RX ORDER — OMEPRAZOLE 20 MG/1
20 CAPSULE, DELAYED RELEASE ORAL DAILY
Status: DISCONTINUED | OUTPATIENT
Start: 2021-12-19 | End: 2021-12-18 | Stop reason: CLARIF

## 2021-12-18 RX ORDER — SODIUM CHLORIDE 0.9 % (FLUSH) 0.9 %
5-40 SYRINGE (ML) INJECTION PRN
Status: DISCONTINUED | OUTPATIENT
Start: 2021-12-18 | End: 2021-12-20 | Stop reason: HOSPADM

## 2021-12-18 RX ORDER — FUROSEMIDE 20 MG/1
20 TABLET ORAL
Status: DISCONTINUED | OUTPATIENT
Start: 2021-12-20 | End: 2021-12-20 | Stop reason: HOSPADM

## 2021-12-18 RX ORDER — PANTOPRAZOLE SODIUM 40 MG/1
40 TABLET, DELAYED RELEASE ORAL
Status: DISCONTINUED | OUTPATIENT
Start: 2021-12-19 | End: 2021-12-20 | Stop reason: HOSPADM

## 2021-12-18 RX ORDER — 0.9 % SODIUM CHLORIDE 0.9 %
1000 INTRAVENOUS SOLUTION INTRAVENOUS ONCE
Status: COMPLETED | OUTPATIENT
Start: 2021-12-18 | End: 2021-12-18

## 2021-12-18 RX ORDER — POLYETHYLENE GLYCOL 3350 17 G/17G
17 POWDER, FOR SOLUTION ORAL DAILY PRN
Status: DISCONTINUED | OUTPATIENT
Start: 2021-12-18 | End: 2021-12-20 | Stop reason: HOSPADM

## 2021-12-18 RX ADMIN — MEROPENEM 1000 MG: 1 INJECTION, POWDER, FOR SOLUTION INTRAVENOUS at 23:58

## 2021-12-18 RX ADMIN — SODIUM CHLORIDE 1000 ML: 9 INJECTION, SOLUTION INTRAVENOUS at 15:00

## 2021-12-18 ASSESSMENT — ENCOUNTER SYMPTOMS
SORE THROAT: 0
DIARRHEA: 0
BACK PAIN: 0
EYE REDNESS: 0
COUGH: 0
NAUSEA: 0
SHORTNESS OF BREATH: 0
ABDOMINAL DISTENTION: 0
WHEEZING: 0
EYE PAIN: 0
EYE DISCHARGE: 0
SINUS PRESSURE: 0
VOMITING: 0

## 2021-12-18 NOTE — ED NOTES
Patient arrives via squad per family patient is having hallucinations. Family states she is at her baseline mental status but she is seeing thing/people that are not present. per sister she just finished abx for uti. Patient alert to name  knows she is in a hospital. Patient cooperative with care.  Safety maintained     Teodoro Webber RN  [phone]

## 2021-12-19 LAB
ANION GAP SERPL CALCULATED.3IONS-SCNC: 10 MMOL/L (ref 7–16)
BASOPHILS ABSOLUTE: 0.03 E9/L (ref 0–0.2)
BASOPHILS RELATIVE PERCENT: 0.7 % (ref 0–2)
BUN BLDV-MCNC: 24 MG/DL (ref 6–23)
CALCIUM SERPL-MCNC: 9.1 MG/DL (ref 8.6–10.2)
CHLORIDE BLD-SCNC: 106 MMOL/L (ref 98–107)
CO2: 26 MMOL/L (ref 22–29)
CREAT SERPL-MCNC: 1 MG/DL (ref 0.5–1)
EKG ATRIAL RATE: 74 BPM
EKG Q-T INTERVAL: 484 MS
EKG QRS DURATION: 164 MS
EKG QTC CALCULATION (BAZETT): 507 MS
EKG R AXIS: 138 DEGREES
EKG T AXIS: -32 DEGREES
EKG VENTRICULAR RATE: 66 BPM
EOSINOPHILS ABSOLUTE: 0.17 E9/L (ref 0.05–0.5)
EOSINOPHILS RELATIVE PERCENT: 3.8 % (ref 0–6)
GFR AFRICAN AMERICAN: >60
GFR NON-AFRICAN AMERICAN: 52 ML/MIN/1.73
GLUCOSE BLD-MCNC: 92 MG/DL (ref 74–99)
HCT VFR BLD CALC: 32 % (ref 34–48)
HEMOGLOBIN: 9.5 G/DL (ref 11.5–15.5)
IMMATURE GRANULOCYTES #: 0.02 E9/L
IMMATURE GRANULOCYTES %: 0.4 % (ref 0–5)
LACTIC ACID: 1.6 MMOL/L (ref 0.5–2.2)
LYMPHOCYTES ABSOLUTE: 1.14 E9/L (ref 1.5–4)
LYMPHOCYTES RELATIVE PERCENT: 25.2 % (ref 20–42)
MCH RBC QN AUTO: 29.8 PG (ref 26–35)
MCHC RBC AUTO-ENTMCNC: 29.7 % (ref 32–34.5)
MCV RBC AUTO: 100.3 FL (ref 80–99.9)
MONOCYTES ABSOLUTE: 0.39 E9/L (ref 0.1–0.95)
MONOCYTES RELATIVE PERCENT: 8.6 % (ref 2–12)
NEUTROPHILS ABSOLUTE: 2.78 E9/L (ref 1.8–7.3)
NEUTROPHILS RELATIVE PERCENT: 61.3 % (ref 43–80)
PDW BLD-RTO: 14.9 FL (ref 11.5–15)
PLATELET # BLD: 116 E9/L (ref 130–450)
PMV BLD AUTO: 11.4 FL (ref 7–12)
POTASSIUM REFLEX MAGNESIUM: 3.8 MMOL/L (ref 3.5–5)
PROCALCITONIN: 0.08 NG/ML (ref 0–0.08)
RBC # BLD: 3.19 E12/L (ref 3.5–5.5)
SODIUM BLD-SCNC: 142 MMOL/L (ref 132–146)
TROPONIN, HIGH SENSITIVITY: 30 NG/L (ref 0–9)
TROPONIN, HIGH SENSITIVITY: 31 NG/L (ref 0–9)
WBC # BLD: 4.5 E9/L (ref 4.5–11.5)

## 2021-12-19 PROCEDURE — 36415 COLL VENOUS BLD VENIPUNCTURE: CPT

## 2021-12-19 PROCEDURE — 1200000000 HC SEMI PRIVATE

## 2021-12-19 PROCEDURE — 2580000003 HC RX 258: Performed by: STUDENT IN AN ORGANIZED HEALTH CARE EDUCATION/TRAINING PROGRAM

## 2021-12-19 PROCEDURE — 6370000000 HC RX 637 (ALT 250 FOR IP): Performed by: NURSE PRACTITIONER

## 2021-12-19 PROCEDURE — 93010 ELECTROCARDIOGRAM REPORT: CPT | Performed by: INTERNAL MEDICINE

## 2021-12-19 PROCEDURE — 85025 COMPLETE CBC W/AUTO DIFF WBC: CPT

## 2021-12-19 PROCEDURE — 97161 PT EVAL LOW COMPLEX 20 MIN: CPT

## 2021-12-19 PROCEDURE — 97530 THERAPEUTIC ACTIVITIES: CPT

## 2021-12-19 PROCEDURE — 84484 ASSAY OF TROPONIN QUANT: CPT

## 2021-12-19 PROCEDURE — 6360000002 HC RX W HCPCS: Performed by: STUDENT IN AN ORGANIZED HEALTH CARE EDUCATION/TRAINING PROGRAM

## 2021-12-19 PROCEDURE — 80048 BASIC METABOLIC PNL TOTAL CA: CPT

## 2021-12-19 PROCEDURE — 2580000003 HC RX 258: Performed by: NURSE PRACTITIONER

## 2021-12-19 PROCEDURE — 83605 ASSAY OF LACTIC ACID: CPT

## 2021-12-19 PROCEDURE — 84145 PROCALCITONIN (PCT): CPT

## 2021-12-19 RX ADMIN — ISOSORBIDE MONONITRATE 60 MG: 60 TABLET, EXTENDED RELEASE ORAL at 09:14

## 2021-12-19 RX ADMIN — Medication 200 MG: at 21:58

## 2021-12-19 RX ADMIN — PRAVASTATIN SODIUM 40 MG: 20 TABLET ORAL at 21:58

## 2021-12-19 RX ADMIN — MEROPENEM 1000 MG: 1 INJECTION, POWDER, FOR SOLUTION INTRAVENOUS at 11:18

## 2021-12-19 RX ADMIN — APIXABAN 2.5 MG: 2.5 TABLET, FILM COATED ORAL at 09:14

## 2021-12-19 RX ADMIN — Medication 10 ML: at 11:18

## 2021-12-19 RX ADMIN — LISINOPRIL 5 MG: 5 TABLET ORAL at 09:14

## 2021-12-19 RX ADMIN — LEVOTHYROXINE SODIUM 25 MCG: 25 TABLET ORAL at 06:39

## 2021-12-19 RX ADMIN — PRAVASTATIN SODIUM 40 MG: 20 TABLET ORAL at 00:04

## 2021-12-19 RX ADMIN — Medication 1 TABLET: at 16:30

## 2021-12-19 RX ADMIN — METOPROLOL TARTRATE 25 MG: 25 TABLET, FILM COATED ORAL at 21:58

## 2021-12-19 RX ADMIN — METOPROLOL TARTRATE 25 MG: 25 TABLET, FILM COATED ORAL at 09:14

## 2021-12-19 RX ADMIN — APIXABAN 2.5 MG: 2.5 TABLET, FILM COATED ORAL at 21:58

## 2021-12-19 RX ADMIN — Medication 10 ML: at 22:17

## 2021-12-19 RX ADMIN — PANTOPRAZOLE SODIUM 40 MG: 40 TABLET, DELAYED RELEASE ORAL at 06:39

## 2021-12-19 RX ADMIN — MEROPENEM 1000 MG: 1 INJECTION, POWDER, FOR SOLUTION INTRAVENOUS at 16:00

## 2021-12-19 NOTE — PROGRESS NOTES
Bedside discussion with daughter, Rudy Mayorga durable medical POA,  regarding code status. Previous code status at [facility] here [facility]. Daughter shares her mother does not wish to be intubated and no CPR performed in the event of respiratory or cardiac arrest.   Reviewed Limited code status with not intubation and no chest compression.

## 2021-12-19 NOTE — H&P
Illa Romberg, M.D. History and Physical      CHIEF COMPLAINT: Weakness, altered mental status    Reason for Admission: Hallucinations in patient with UTI    History Obtained From: Patient/EMR    HISTORY OF PRESENT ILLNESS:      The patient is a 80 y.o. female of [facility] with significant past medical history of multiple medical conditions, see below who presents with complaints of hallucinations per family. Patient apparently was being treated for urinary tract infection. Subsequently evaluated in the emergency department and admitted. No white count, UA appears essentially unremarkable, head CT negative, blood work unremarkable. According to daughter, patient is currently back at her normal baseline without any further delirium.         All labs personally reviewed   All imaging personally reviewed     Past Medical History:        Diagnosis Date    [facility] ([facility]     Arthritis     Atrial fibrillation ([facility]     Blood transfusion     Blood transfusion reaction     CAD (coronary artery disease)     Cancer (HCC)     hx skin ca nose    CHF (congestive heart failure) (HCC)     Chronic back pain     Chronic kidney disease     UTI    GERD (gastroesophageal reflux disease)     GI bleed     History of blood transfusion     Hyperlipidemia     Hypertension     Pacemaker     Pneumonia 1/5/2020    Psychiatric problem     anxiety    Syncope     recurrent with av block    Thyroid disease      Past Surgical History:        Procedure Laterality Date    APPENDECTOMY      BREAST SURGERY      removed tumor L breast    CARDIAC CATHETERIZATION  03/13/2014    CARDIAC PACEMAKER PLACEMENT  3/8/11    CARDIOVERSION  06/28/2012    Dr Nita Bravo:  DCCV 1 shock 50 joules= successful    COLONOSCOPY      COLONOSCOPY  10/3/2018    COLONOSCOPY WITH BIOPSY performed by Lior Abarca MD at [place] [address]  2009     2 stents    CORONARY ARTERY BYPASS GRAFT      ECHO COMPL W DOP COLOR FLOW  5/23/2012         ENDOSCOPY, COLON, DIAGNOSTIC      EYE SURGERY      bilat cataracts    HYSTERECTOMY      PACEMAKER PLACEMENT      [facility] 5/2011    [place] LAP,SURG,COLECTOMY, PARTIAL, W/ANAST N/A 10/5/2018    LAPAROSCOPIC RIGHT FRANCY COLECTOMY performed by Atif Deutsch MD at [facility]           Medications Prior to Admission:    Medications Prior to Admission: apixaban (ELIQUIS) 2.5 MG TABS tablet, Take 1 tablet by mouth 2 times daily  morphine (MSIR) 15 MG tablet, Take 15 mg by mouth 2 times daily. Extended release  furosemide (LASIX) 20 MG tablet, Take 20 mg by mouth three times a week Mon, Weds, Fri  omeprazole (PRILOSEC) 20 MG delayed release capsule, Take 20 mg by mouth daily  magnesium (MAGNESIUM-OXIDE) 250 MG TABS tablet, Take 250 mg by mouth nightly  HYDROcodone-acetaminophen (NORCO)  MG per tablet, Take 1 tablet by mouth every 12 hours as needed for Pain.  metoprolol tartrate (LOPRESSOR) 25 MG tablet, Take 1 tablet by mouth 2 times daily  pravastatin (PRAVACHOL) 40 MG tablet, Take 1 tablet by mouth nightly  traZODone (DESYREL) 100 MG tablet, Take 100 mg by mouth nightly  Multiple Vitamins-Minerals (THERAPEUTIC MULTIVITAMIN-MINERALS) tablet, Take 1 tablet by mouth Daily with supper  isosorbide mononitrate (IMDUR) 60 MG CR tablet,  Take 60 mg by mouth daily   docusate sodium (COLACE) 100 MG capsule, Take 100 mg by mouth 2 times daily as needed for Constipation  levothyroxine (SYNTHROID) 25 MCG tablet, Take 25 mcg by mouth Daily. budesonide (PULMICORT) 0.5 MG/2ML nebulizer suspension, Take 4 mLs by nebulization 2 times daily  formoterol (PERFOROMIST) 20 MCG/2ML nebulizer solution, Take 2 mLs by nebulization 2 times daily  miconazole (MICOTIN) 2 % powder, Apply topically 2 times daily.   nystatin (MYCOSTATIN) 181733 UNIT/ML suspension, Take 5 mLs by mouth 4 times daily (Patient not taking: Reported on 1/22/2020)  lisinopril (PRINIVIL;ZESTRIL) 5 MG tablet, Take 5 mg by mouth daily  nitroGLYCERIN (NITROSTAT) 0.4 MG SL tablet, Place 0.4 mg under the tongue every 5 minutes as needed. Allergies:  Sulfa antibiotics, Adhesive tape, Bactrim, and Pcn [penicillins]    Social History:   TOBACCO:   reports that she has never smoked. She has never used smokeless tobacco.  ETOH:   reports no history of alcohol use. MARITAL STATUS:    OCCUPATION:      Family History:       Problem Relation Age of Onset    Heart Disease Mother     Heart Disease Father     Stroke Father     Cancer Sister     Heart Disease Brother     Stroke Brother        REVIEW OF SYSTEMS:    General ROS: positive for  - fatigue and malaise, confusion  Hematological and Lymphatic ROS: negative  Endocrine ROS: negative  Respiratory ROS: no cough, shortness of breath, or wheezing  Cardiovascular ROS: no chest pain or dyspnea on exertion  Gastrointestinal ROS: no abdominal pain, change in bowel habits, or black or bloody stools  Genito-Urinary ROS: no dysuria, trouble voiding, or hematuria  Neurological ROS: no TIA or stroke symptoms  negative    Vitals:  BP (!) 128/59   Pulse 72   Temp 97.8 °F (36.6 °C) (Oral)   Resp 15   Ht 5' 1\" (1.549 m)   Wt 122 lb (55.3 kg)   SpO2 93%   BMI 23.05 kg/m²     PHYSICAL EXAM:  General:  Awake, alert, oriented X 3. Well developed, well nourished, well groomed. No apparent distress. HEENT:  Normocephalic, atraumatic. Pupils equal, round, reactive to light. No scleral icterus. No conjunctival injection. Normal lips, teeth, and gums. No nasal discharge. Neck:  Supple, FROM  Heart:  RRR, no murmurs, gallops, rubs, carotid upstroke normal, no carotid bruits  Lungs:  CTA bilaterally, bilat symmetrical expansion, no wheeze, rales, or rhonchi  Abdomen:   Bowel sounds present, soft, nontender, no masses, no organomegaly, no peritoneal signs  Extremities:  No clubbing, cyanosis, or edema  Skin:  Warm and dry, no open lesions or rash  Neuro:  Cranial nerves 2-12 intact, no focal deficits  Vascular: Radial and pedal Pulses 2+  Breast: deferred  Rectal: deferred  Genitalia:  deferred      DATA:     Recent Labs     12/18/21  1708 12/19/21  0323   WBC 5.9 4.5   HGB 10.6* 9.5*   * 116*     Recent Labs     12/18/21  1708 12/19/21  0323    142   K 4.0 3.8   BUN 25* 24*   CREATININE 1.0 1.0     Recent Labs     12/18/21  1708   PROT 6.8     Recent Labs     12/18/21  1708   AST 27   ALT 13   ALKPHOS 119*   BILITOT 0.4     No results for input(s): BNP in the last 72 hours. No results for input(s): CKTOTAL, CKMB, CKMBINDEX, TROPONINI in the last 72 hours. ASSESSMENT:      Active Problems:    Essential hypertension    CKD (chronic kidney disease)    Atrial fibrillation (HCC)    CAD (coronary artery disease)    Pacemaker    Chronic anticoagulation    Hyperlipidemia    Hypothyroidism    UTI (urinary tract infection)    Altered mental status    Chronic congestive heart failure (HCC)    Elevated troponin  Resolved Problems:    * No resolved hospital problems.  *        PLAN:    Admitted to telemetry for hallucinations/delirium  May have been from urinary tract infection-patient was receiving Invanz at nursing facility  Would give another dose of IV Merrem today  Continue current home medications  Check lactic and procalcitonin now-if unremarkable, she can be discharged    Discussed with daughter at bedside  DVT prophylaxis  PT OT  Discharge plan    Ivan Rodriguez MD  12/19/2021  11:39 AM

## 2021-12-19 NOTE — PROGRESS NOTES
Initial Evaluation      Attending Provider:  Reg Brewer MD    Evaluating PT:  Angela White PT, RRT, CEAS    Room #:  0404/9410-X  Diagnosis:  UTI/AMS  Pertinent PMHx/PSHx:  Arthritis, CAD, CHF, HTN, Thyroid dz, A-fib, C back pain, CKD, GERD, Pacer,   Precautions:  General, Falls,   Equipment Needs:  WC level mobility PTA/Dtr     SUBJECTIVE:    Pt lives in facility. Pt ambulated with assist only and only very short dist with WW to bathroom/Dtr PTA. Dtr states she is WC dependant and mobilizes with her LE indep. OBJECTIVE:   Initial Evaluation  Date: 12/19/21 Treatment Short Term/ Long Term   Goals   Was pt agreeable to Eval/treatment? Yes     Does pt have pain? None expressed     Bed Mobility  Rolling: SBA/CGA  Supine to sit: Min/Mod  Sit to supine: NA  Scooting: Min/Mod  CGA to Min assist levels   Transfers Sit to stand: CGA/SBA  Stand to sit: SBA/S  Stand pivot: NA  S/SBA all surfaces   Ambulation   20 feet with [employer] CGA  40 feet with [employer] CGA   Stair negotiation: ascended and descended  NA       ROM WFL     MMT 3-4/5 LE's   4/5 BLE's globally   AM-PAC 6 Clicks 23/71       Pt is A & O x 3   Sensation:  Pt denies numbness and tingling to extremities  Edema:  NA  Balance: sitting:Indep and  standing: with [employer] SBA/CGA  Endurance: Functional    ASSESSMENT:    Comments: In bed on arrival in no distress. Dtr in room helping with questions on prior mobility status. Transfer to EOB Min/Mod assisted w/o c/o dizziness. Sat on EOB 4-5' to allow core MMT and endurance prior to transfer to stand. Stood from bed with CGA/SBA level w/o drifting to stand or LOB. No dizziness with transfer to stand. Amb with [employer] with a slower step through pattern with some mild drifting to RT with assist to maneuver WW.  Narrow MERCY leading to some instability. No fatigue behaviors or conversational dyspnea during or post walk to stewart. Return back to room to sit in stack chair. Chair alarm placed in line and activated.   Dtr remains in room to assist care. Call light and wall phone on bed to LT. Very appreciative for care. Treatment:  Patient practiced and was instructed in the following treatment:     Eval   Transfer   Bed mob   Amb    Pt's/ family goals   1. Return to facility    Patient and or family understand(s) diagnosis, prognosis, and plan of care. PLAN:    PT care will be provided in accordance with the objectives noted above. Exercises and functional mobility practice will be used as well as appropriate assistive devices or modalities to obtain goals. Patient and family education will also be administered as needed. Frequency of treatments: 2-5x/week x 1-2 weeks. Total Treatment Time  15 minutes     Evaluation Time includes thorough review of current medical information, gathering information on past medical history/social history and prior level of function, completion of standardized testing/informal observation of tasks, assessment of data and education on plan of care and goals.     CPT codes:  [x] Low Complexity PT evaluation 91249  [] Moderate Complexity PT evaluation 56097  [] High Complexity PT evaluation 66277  [] PT Re-evaluation 44027  [] Gait training 47021 ** minutes  [] Manual therapy 35763 ** minutes  [x] Therapeutic activities 30887 ** minutes  [] Therapeutic exercises 45783 ** minutes  [] Neuromuscular reeducation 33190 ** minutes    Floridalma Martinez PT, RRT, CEAS

## 2021-12-19 NOTE — PROGRESS NOTES
Called to facility ([facility])  for medical reconcilliation. Per daughter Houston Martinez, pacemaker left chest  Installed 3/8/2011 Dr Charlene Kim, PM 8147 Serial number [id number]. Patient also has a history of afib and has 2 stents placed in 2009.

## 2021-12-19 NOTE — PLAN OF CARE
Problem: Falls - Risk of:  Goal: Will remain free from falls  Description: Will remain free from falls  12/19/2021 1418 by Dean Molina RN  Outcome: Met This Shift     Problem: Falls - Risk of:  Goal: Absence of physical injury  Description: Absence of physical injury  12/19/2021 1418 by Dean Molina RN  Outcome: Met This Shift     Problem: Skin Integrity:  Goal: Will show no infection signs and symptoms  Description: Will show no infection signs and symptoms  12/19/2021 1418 by Dean Molina RN  Outcome: Ongoing     Problem: Skin Integrity:  Goal: Absence of new skin breakdown  Description: Absence of new skin breakdown  12/19/2021 1418 by Dean Molina RN  Outcome: Ongoing

## 2021-12-20 VITALS
SYSTOLIC BLOOD PRESSURE: 152 MMHG | WEIGHT: 122 LBS | HEART RATE: 66 BPM | HEIGHT: 61 IN | TEMPERATURE: 97.9 F | RESPIRATION RATE: 16 BRPM | BODY MASS INDEX: 23.03 KG/M2 | DIASTOLIC BLOOD PRESSURE: 62 MMHG | OXYGEN SATURATION: 98 %

## 2021-12-20 LAB — SARS-COV-2, NAAT: NOT DETECTED

## 2021-12-20 PROCEDURE — 94664 DEMO&/EVAL PT USE INHALER: CPT

## 2021-12-20 PROCEDURE — 2580000003 HC RX 258: Performed by: STUDENT IN AN ORGANIZED HEALTH CARE EDUCATION/TRAINING PROGRAM

## 2021-12-20 PROCEDURE — 97165 OT EVAL LOW COMPLEX 30 MIN: CPT

## 2021-12-20 PROCEDURE — 97535 SELF CARE MNGMENT TRAINING: CPT

## 2021-12-20 PROCEDURE — 6370000000 HC RX 637 (ALT 250 FOR IP): Performed by: NURSE PRACTITIONER

## 2021-12-20 PROCEDURE — 6360000002 HC RX W HCPCS: Performed by: NURSE PRACTITIONER

## 2021-12-20 PROCEDURE — 6360000002 HC RX W HCPCS: Performed by: STUDENT IN AN ORGANIZED HEALTH CARE EDUCATION/TRAINING PROGRAM

## 2021-12-20 PROCEDURE — 94640 AIRWAY INHALATION TREATMENT: CPT

## 2021-12-20 PROCEDURE — 87635 SARS-COV-2 COVID-19 AMP PRB: CPT

## 2021-12-20 PROCEDURE — 2580000003 HC RX 258: Performed by: NURSE PRACTITIONER

## 2021-12-20 RX ADMIN — MEROPENEM 1000 MG: 1 INJECTION, POWDER, FOR SOLUTION INTRAVENOUS at 00:32

## 2021-12-20 RX ADMIN — BUDESONIDE 1000 MCG: 0.5 SUSPENSION RESPIRATORY (INHALATION) at 09:44

## 2021-12-20 RX ADMIN — Medication 10 ML: at 08:57

## 2021-12-20 RX ADMIN — LISINOPRIL 5 MG: 5 TABLET ORAL at 08:56

## 2021-12-20 RX ADMIN — PANTOPRAZOLE SODIUM 40 MG: 40 TABLET, DELAYED RELEASE ORAL at 07:02

## 2021-12-20 RX ADMIN — MEROPENEM 1000 MG: 1 INJECTION, POWDER, FOR SOLUTION INTRAVENOUS at 07:02

## 2021-12-20 RX ADMIN — LEVOTHYROXINE SODIUM 25 MCG: 25 TABLET ORAL at 07:02

## 2021-12-20 RX ADMIN — ISOSORBIDE MONONITRATE 60 MG: 60 TABLET, EXTENDED RELEASE ORAL at 08:56

## 2021-12-20 RX ADMIN — FUROSEMIDE 20 MG: 20 TABLET ORAL at 08:56

## 2021-12-20 RX ADMIN — METOPROLOL TARTRATE 25 MG: 25 TABLET, FILM COATED ORAL at 08:56

## 2021-12-20 RX ADMIN — APIXABAN 2.5 MG: 2.5 TABLET, FILM COATED ORAL at 08:56

## 2021-12-20 RX ADMIN — ARFORMOTEROL TARTRATE 15 MCG: 15 SOLUTION RESPIRATORY (INHALATION) at 09:44

## 2021-12-20 NOTE — PLAN OF CARE
Problem: Falls - Risk of:  Goal: Will remain free from falls  Description: Will remain free from falls  12/20/2021 0002 by Sofía Song RN  Outcome: Met This Shift  12/19/2021 1418 by Giuseppe Nicolas RN  Outcome: Met This Shift  Goal: Absence of physical injury  Description: Absence of physical injury  12/20/2021 0002 by Sofía Song RN  Outcome: Met This Shift  12/19/2021 1418 by Giuseppe Nicolas RN  Outcome: Met This Shift     Problem: Skin Integrity:  Goal: Will show no infection signs and symptoms  Description: Will show no infection signs and symptoms  12/20/2021 0002 by Sofía Song RN  Outcome: Met This Shift  12/19/2021 1418 by Giuseppe Nicolas RN  Outcome: Ongoing  Goal: Absence of new skin breakdown  Description: Absence of new skin breakdown  12/20/2021 0002 by Sofía Song RN  Outcome: Met This Shift  12/19/2021 1418 by Giuseppe Nicolas RN  Outcome: Ongoing

## 2021-12-20 NOTE — DISCHARGE INSTR - COC
Continuity of Care Form    Patient Name: Sonia Estrella   :  1928  MRN:  [id number]    Admit date:  2021  Discharge date:  ***    Code Status Order: DNR-CCA   Advance Directives:      Admitting Physician:  Reg Brewer MD  PCP: Shelby Arcos DO    Discharging Nurse: [facility] Unit/Room#: 1166/4202-T  Discharging Unit Phone Number: ***    Emergency Contact:   Extended Emergency Contact Information  Primary Emergency Contact: [facility]  Address: [address] [place], [place] [address] Phone: [phone]  Work Phone: [phone]  Mobile Phone: [phone]  Relation: Child  Secondary Emergency Contact: Enid Harrison  Address: [address] [address]  [address] [facility], [address] [place] [address] Phone: [phone]  Relation: Child    Past Surgical History:  Past Surgical History:   Procedure Laterality Date    APPENDECTOMY      BREAST SURGERY      removed tumor L breast    CARDIAC CATHETERIZATION  2014    CARDIAC PACEMAKER PLACEMENT  3/8/11    CARDIOVERSION  2012    Dr Stephen Pun:  DCCV 1 shock 50 joules= successful    COLONOSCOPY      COLONOSCOPY  10/3/2018    COLONOSCOPY WITH BIOPSY performed by Cyrus Ramirez MD at [facility]  2009     2 stents    CORONARY ARTERY BYPASS GRAFT      ECHO COMPL W DOP COLOR FLOW  2012         ENDOSCOPY, COLON, DIAGNOSTIC      EYE SURGERY      bilat cataracts    HYSTERECTOMY      PACEMAKER PLACEMENT      [facility] 2011    [place] LAP,SURG,COLECTOMY, PARTIAL, W/ANAST N/A 10/5/2018    LAPAROSCOPIC RIGHT FRANCY COLECTOMY performed by Negin Pope MD at [address] [place] [address]         Immunization History:   Immunization History   Administered Date(s) Administered    Influenza Vaccine, unspecified formulation 10/23/2017    Pneumococcal Conjugate Vaccine 2017       Active Problems:  Patient Active Problem List   Diagnosis Code    Generalized abdominal pain R10.84    Atrial fibrillation ([facility]) I48.91    CAD (coronary artery disease) I25.10    Pacemaker Z95.0    Chronic anticoagulation Z79.01    Atrial tachycardia ([facility]) I47.1    Chest pain R07.9    Valvular heart disease W42    Acute systolic CHF (congestive heart failure) ([facility]) I50.21    Coagulopathy ([facility]) D68.9    Terminal ileitis with rectal bleeding ([facility]) K50.011    BRBPR (bright red blood per rectum) K62.5    Diarrhea R19.7    Nausea and vomiting R11.2    Supratherapeutic PT INR R79.1    Essential hypertension I10    Chronic diastolic CHF (congestive heart failure) ([facility]) I50.32    CKD (chronic kidney disease) N18.9    Hyperlipidemia E78.5    Hypothyroidism E03.9    Abnormal chest sounds - suspect LLL pneumonia R09.89    Moderate protein-calorie malnutrition ([facility]) E44.0    Acidosis E87.2    Anemia associated with acute blood loss D62    Cancer of ascending colon ([facility]) C18.2    UTI (urinary tract infection) N39.0    Dyspnea R06.00    Palliative care by specialist Z51.5    Goals of care, counseling/discussion Z71.89    Pneumonia J18.9    Altered mental status R41.82    Chronic congestive heart failure (HCC) I50.9    Elevated troponin R77.8       Isolation/Infection:   Isolation            No Isolation          Patient Infection Status       Infection Onset Added Last Indicated Last Indicated By Review Planned Expiration Resolved Resolved By    None active    Resolved    COVID-19 (Rule Out) 20 Covid-19 Ambulatory (Ordered)   20 Rule-Out Test Resulted    COVID-19 20 Covid-19 Ambulatory   10/05/20     COVID-19 (Rule Out) 20 Covid-19 Ambulatory (Ordered)   20 Rule-Out Test Resulted    COVID-19 20 Covid-19 Ambulatory   20     COVID-19 (Rule Out) 20 Covid-19 Ambulatory (Ordered)   20 Rule-Out Test Resulted    COVID-19 (Rule Out) 09/01/20 09/01/20 09/01/20 Covid-19 Ambulatory (Ordered)   09/02/20 Rule-Out Test Resulted    COVID-19 (Rule Out) 08/13/20 08/13/20 08/13/20 Covid-19 Ambulatory (Ordered)   08/15/20 Rule-Out Test Resulted    COVID-19 (Rule Out) 06/17/20 06/17/20 06/17/20 Covid-19 Ambulatory (Ordered)   06/19/20 Rule-Out Test Resulted    C-diff Rule Out  12/16/19 12/16/19 CLOSTRIDIUM DIFFICILE EIA (Ordered)   12/18/19 Atif Whyte, SUSIE            Nurse Assessment:  Last Vital Signs: BP (!) 152/62   Pulse 66   Temp 97.9 °F (36.6 °C) (Oral)   Resp 16   Ht 5' 1\" (1.549 m)   Wt 122 lb (55.3 kg)   SpO2 99%   BMI 23.05 kg/m²     Last documented pain score (0-10 scale):    Last Weight:   Wt Readings from Last 1 Encounters:   12/18/21 122 lb (55.3 kg)     Mental Status:  disoriented, alert, coherent, able to concentrate and follow conversation, and ***    IV Access:  - None    Nursing Mobility/ADLs:  Walking   Dependent  Transfer  Assisted  Bathing  Dependent  Dressing  Dependent  Toileting  Dependent  Feeding  Independent  Med Admin  Assisted  Med Delivery   whole    Wound Care Documentation and Therapy:  Wound 12/15/19 Toe (Comment  which one) Anterior;Left; Other (Comment) (Active)   Number of days: 735        Elimination:  Continence: Bowel: Yes  Bladder: Yes  Urinary Catheter: None   Colostomy/Ileostomy/Ileal Conduit: No       Date of Last BM: 12/20/2021    Intake/Output Summary (Last 24 hours) at 12/20/2021 0859  Last data filed at 12/20/2021 0854  Gross per 24 hour   Intake 280 ml   Output --   Net 280 ml     No intake/output data recorded. Safety Concerns:      At Risk for Falls    Impairments/Disabilities:      Vision    Nutrition Therapy:  Current Nutrition Therapy:   - Oral Diet:  General    Routes of Feeding: Oral  Liquids: No Restrictions  Daily Fluid Restriction: no  Last Modified Barium Swallow with Video (Video Swallowing Test): not done    Treatments at the Time of Hospital Discharge:   Respiratory Treatments: ***  Oxygen Therapy:  is not on home oxygen therapy. Ventilator:    - No ventilator support    Rehab Therapies: Physical Therapy and Occupational Therapy  Weight Bearing Status/Restrictions: No weight bearing restirctions  Other Medical Equipment (for information only, NOT a DME order):  wheelchair, bedside commode, and hospital bed  Other Treatments: ***    Patient's personal belongings (please select all that are sent with patient):  Glasses    RN SIGNATURE:  Electronically signed by Uvaldo Alvarez RN on 12/20/21 at 10:13 AM EST    CASE MANAGEMENT/SOCIAL WORK SECTION    Inpatient Status Date: ***    Readmission Risk Assessment Score:  Readmission Risk              Risk of Unplanned Readmission:  24           Discharging to Facility/ [facility] Details  FAX            [address] [place] [address]       Phone: [phone]       Fax: [phone]          Dialysis Facility (if applicable)   Name:  Address:  Dialysis Schedule:  Phone:  Fax:    / signature: Electronically signed by JESSEE Tran on 12/20/2021 at 9:00 AM    PHYSICIAN SECTION    Prognosis: Good    Condition at Discharge: Stable    Rehab Potential (if transferring to Rehab): Good    Recommended Labs or Other Treatments After Discharge: CBC and BMP in 1 week    Physician Certification: I certify the above information and transfer of Obey Grimaldo  is necessary for the continuing treatment of the diagnosis listed and that she requires [place] for less 30 days.      Update Admission H&P: No change in H&P    PHYSICIAN SIGNATURE:  Electronically signed by PRISCILLA Bunch CNP on 12/20/21 at 9:11 AM EST

## 2021-12-20 NOTE — PROGRESS NOTES
the patient. Radiographs, labs and medication list were reviewed by me independently. The case was discussed in detail and plans for care were established. Review of Kelly EWLLS- CNP, documentation was conducted and revisions were made as appropriate directly by me. I agree with the above documented exam, problem list, and plan of care.      Livan Ibanez MD  7:41 PM  12/20/2021

## 2021-12-20 NOTE — CARE COORDINATION
Social Work:    Mrs. Slime Guerrier is a bedhold at [facility] and will return via [facility] ambulance at 11:00 a.m. Patient, daughter Cricket Huffman at [facility] are aware.     Electronically signed by JESSEE Greer on 12/20/2021 at 9:01 AM

## 2021-12-20 NOTE — PROGRESS NOTES
Occupational Therapy  OCCUPATIONAL THERAPY INITIAL EVALUATION     Odalys Fernandez [address]        JUM:                                                  Patient Name: Julia Zapien    MRN: [id number]    : 1928    Room: [address] [place] [address]      Evaluating OT: Vaibhav Davis, OTR/L [id number]      Referring Provider: PRISCILLA Tavares CNP    Specific Provider Orders/Date: OT eval and treat 21      Diagnosis: Altered mental status [R41.82]  Acute cystitis with hematuria [N30.01]  Altered mental status, unspecified altered mental status type [R41.82]      Pertinent Medical History: a-fib, arthritis, CAD, skin CA, CHF, chronic back pain, CKD, GERD, GI bleed, HTN, pacemaker, pneumonia, anxiety, syncope      Precautions:  Fall Risk, alarm, confusion     Assessment of current deficits    [x] Functional mobility  [x]ADLs  [x] Strength               [x]Cognition    [x] Functional transfers   [x] IADLs         [x] Safety Awareness   [x]Endurance    [] Fine Coordination              [x] Balance      [] Vision/perception   []Sensation     []Gross Motor Coordination  [] ROM  [] Delirium                   [] Motor Control     OT PLAN OF CARE   OT POC based on physician orders, patient diagnosis and results of clinical assessment    Frequency/Duration 2--4 days/wk for 2 weeks PRN   Specific OT Treatment Interventions to include:   * Instruction/training on adapted ADL techniques and AE recommendations to increase functional independence within precautions       * Training on energy conservation strategies, correct breathing pattern and techniques to improve independence/tolerance for self-care routine  * Functional transfer/mobility training/DME recommendations for increased independence, safety, and fall prevention  * Patient/Family education to increase follow through with safety techniques and functional independence  * Recommendation of environmental modifications for increased safety with functional transfers/mobility and ADLs  * Cognitive retraining/development of therapeutic activities to improve problem solving, judgement, memory, and attention for increased safety/participation in ADL/IADL tasks  * Therapeutic exercise to improve motor endurance, ROM, and functional strength for ADLs/functional transfers  * Therapeutic activities to facilitate/challenge dynamic balance, stand tolerance for increased safety and independence with ADLs        Recommended Adaptive Equipment: TBD     Home Living: Pt is poor historian. Per chart, pt is from [facility] and uses wheeled walker for very short distances, such as going to the bathroom. Pt uses w/c for all other functional mobility. Pain Level: Pt did not report pain this date  Cognition: A&O: 3/4; 1-2 step command follow demonstrated. Pt was pleasant but confused throughout session. Memory:  Fair-   Sequencing:  Fair-   Problem solving:  Poor    Judgement/safety:  Poor      Functional Assessment:  AM-PAC Daily Activity Raw Score: 14/24   Initial Eval Status  Date: 12/20/21 Treatment Status  Date: STGs = LTGs  Time frame: 10-14 days   Feeding Set up     Grooming SBA/set up for hand hygiene seated  Sup   UB Dressing Mod A to doff/don hospital gown  SBA   LB Dressing Max A to don brief   Min A-with use of AD as appropriate/needed   Bathing Max A to clean legs seated   Min A -with use of AD as appropriate/needed   Toileting Max A for clothing management and pericare  Min A    Bed Mobility  Supine to sit:  Max A/Mod A   Sit to supine: Max A/Mod A   Min A/SBA to maximize participation in ADLs and functional tasks   Functional Transfers Mod A with wheeled walker  Sit<>stand from bed  Sit<>stand from [facility]  SBA    Functional Mobility Min A with wheeled walker  Short distance in room  SBA -with device as needed to maximize independence with ADLs and functional task completion   Balance Sitting:     Static:  SBA    Dynamic:SBA  Standing: Min A with wheeled walker  Sup static/dynamic sitting balance to maximize independence with ADLs and functional task completion    SBA/Sup for standing balance to maximize independence with ADLs and functional task completion   Activity Tolerance Fair- with light activity  Fair+ with ADL activity. Visual/  Perceptual Glasses: yes                Additional long-term goal: Pt will increase functional independence to PLOF to allow pt to live in least restrictive environment. Hand Dominance R   AROM (PROM) Strength Additional Info:    RUE  WFL 4-/5 WFL  and FMC/dexterity noted during ADL tasks       LUE WFL 4-/5 WFL  and FMC/dexterity noted during ADL tasks       Hearing: WFL   Sensation:  No c/o numbness or tingling   Tone: WFL   Edema: none noted    Comments: Upon arrival patient lying in bed. At end of session, patient returned to  bed with call light and phone within reach, all lines and tubes intact. Overall patient demonstrated decreased independence and safety during completion of ADL/functional transfer/mobility tasks. Pt would benefit from continued skilled OT to increase safety and independence with completion of ADL/IADL tasks for functional independence and quality of life. Treatment: OT treatment provided this date includes:    Instruction/training on safety and adapted techniques for completion of ADLs and instruction/training on safe functional mobility/transfer techniques: Pt was Max A/Mod A to sit to EOB with cuing for hand placement and technique. Pt was Mod A to stand from bed and pivot to [facility] with cues for hand and feet placement, with Mod A for safe and controlled descent. Pt sat on [facility] and [facility] gown and donned clean gown with Mod A and cues for sequencing and technique. Pt washed legs while seated on [facility] with Max A and cues for thoroughness. Pt was Max A for pericare following a bowel movement.  Pt cleaned hands seated with SBA. Pt stood from [facility] to wheeled walker with Mod A and cues for hand placement and safe technique to maximize participation and safety with ADLs and functional tasks. Pt walked short distance from [facility] to bed with Min A and cues for safe wheeled walker management. Pt sat to bed with Mod A and returned to supine with Max A/Mod A and increased time. Rehab Potential: Good for established goals     Patient / Family Goal: none stated    Patient and/or family were instructed on functional diagnosis, prognosis/goals and OT plan of care. Demonstrated poor understanding. Eval Complexity: Low    Time In: 0907  Time Out: 0940  Total Treatment Time: 16    Min Units   OT Eval Low 97165  x  1   OT Eval Medium 47740      OT Eval High 30620      OT Re-Eval [id number]       Therapeutic Ex 48953       Therapeutic Activities 87739       ADL/Self Care 43258  16  1   Orthotic Management 60436       Manual 07979     Neuro Re-Ed 47180       Non-Billable Time          Evaluation Time additionally includes thorough review of current medical information, gathering information on past medical history/social history and prior level of function, interpretation of standardized testing/informal observation of tasks, assessment of data and development of plan of care and goals.             Colby Díaz, OTR/L, [id number]

## 2021-12-21 LAB
ORGANISM: ABNORMAL
URINE CULTURE, ROUTINE: ABNORMAL

## 2021-12-22 LAB
ALBUMIN SERPL-MCNC: 3.4 G/DL (ref 3.5–5.2)
ALP BLD-CCNC: 98 U/L (ref 35–104)
ALT SERPL-CCNC: 12 U/L (ref 0–32)
ANION GAP SERPL CALCULATED.3IONS-SCNC: 17 MMOL/L (ref 7–16)
AST SERPL-CCNC: 26 U/L (ref 0–31)
BASOPHILS ABSOLUTE: 0.05 E9/L (ref 0–0.2)
BASOPHILS RELATIVE PERCENT: 0.8 % (ref 0–2)
BILIRUB SERPL-MCNC: 0.5 MG/DL (ref 0–1.2)
BUN BLDV-MCNC: 21 MG/DL (ref 6–23)
CALCIUM SERPL-MCNC: 9.7 MG/DL (ref 8.6–10.2)
CHLORIDE BLD-SCNC: 107 MMOL/L (ref 98–107)
CO2: 23 MMOL/L (ref 22–29)
CREAT SERPL-MCNC: 0.9 MG/DL (ref 0.5–1)
EOSINOPHILS ABSOLUTE: 0.33 E9/L (ref 0.05–0.5)
EOSINOPHILS RELATIVE PERCENT: 5.5 % (ref 0–6)
GFR AFRICAN AMERICAN: >60
GFR NON-AFRICAN AMERICAN: 58 ML/MIN/1.73
GLUCOSE BLD-MCNC: 74 MG/DL (ref 74–99)
HCT VFR BLD CALC: 30.8 % (ref 34–48)
HEMOGLOBIN: 9.4 G/DL (ref 11.5–15.5)
IMMATURE GRANULOCYTES #: 0.02 E9/L
IMMATURE GRANULOCYTES %: 0.3 % (ref 0–5)
LYMPHOCYTES ABSOLUTE: 1.81 E9/L (ref 1.5–4)
LYMPHOCYTES RELATIVE PERCENT: 30.1 % (ref 20–42)
MCH RBC QN AUTO: 29.7 PG (ref 26–35)
MCHC RBC AUTO-ENTMCNC: 30.5 % (ref 32–34.5)
MCV RBC AUTO: 97.2 FL (ref 80–99.9)
MONOCYTES ABSOLUTE: 0.52 E9/L (ref 0.1–0.95)
MONOCYTES RELATIVE PERCENT: 8.6 % (ref 2–12)
NEUTROPHILS ABSOLUTE: 3.29 E9/L (ref 1.8–7.3)
NEUTROPHILS RELATIVE PERCENT: 54.7 % (ref 43–80)
PDW BLD-RTO: 15.3 FL (ref 11.5–15)
PLATELET # BLD: 134 E9/L (ref 130–450)
PMV BLD AUTO: 11.8 FL (ref 7–12)
POTASSIUM SERPL-SCNC: 3.6 MMOL/L (ref 3.5–5)
RBC # BLD: 3.17 E12/L (ref 3.5–5.5)
SODIUM BLD-SCNC: 147 MMOL/L (ref 132–146)
TOTAL PROTEIN: 6.6 G/DL (ref 6.4–8.3)
WBC # BLD: 6 E9/L (ref 4.5–11.5)

## 2021-12-27 LAB
SARS-COV-2: NOT DETECTED
SOURCE: NORMAL

## 2021-12-28 LAB
SARS-COV-2: NOT DETECTED
SOURCE: NORMAL

## 2021-12-29 LAB
BACTERIA: ABNORMAL /HPF
BILIRUBIN URINE: NEGATIVE
BLOOD, URINE: NEGATIVE
CLARITY: CLEAR
COLOR: YELLOW
EPITHELIAL CELLS, UA: ABNORMAL /HPF
GLUCOSE URINE: NEGATIVE MG/DL
KETONES, URINE: NEGATIVE MG/DL
LEUKOCYTE ESTERASE, URINE: ABNORMAL
NITRITE, URINE: NEGATIVE
PH UA: 7 (ref 5–9)
PROTEIN UA: NEGATIVE MG/DL
RBC UA: ABNORMAL /HPF (ref 0–2)
SPECIFIC GRAVITY UA: 1.01 (ref 1–1.03)
UROBILINOGEN, URINE: 1 E.U./DL
WBC UA: ABNORMAL /HPF (ref 0–5)

## 2022-01-01 ENCOUNTER — HOSPITAL ENCOUNTER (INPATIENT)
Age: 87
LOS: 1 days | Discharge: HOSPICE | DRG: 178 | End: 2022-11-03
Attending: EMERGENCY MEDICINE | Admitting: INTERNAL MEDICINE
Payer: MEDICARE

## 2022-01-01 ENCOUNTER — APPOINTMENT (OUTPATIENT)
Dept: GENERAL RADIOLOGY | Age: 87
DRG: 178 | End: 2022-01-01
Payer: MEDICARE

## 2022-01-01 VITALS
HEART RATE: 84 BPM | SYSTOLIC BLOOD PRESSURE: 187 MMHG | HEIGHT: 61 IN | DIASTOLIC BLOOD PRESSURE: 92 MMHG | WEIGHT: 85.2 LBS | RESPIRATION RATE: 18 BRPM | OXYGEN SATURATION: 100 % | TEMPERATURE: 97.3 F | BODY MASS INDEX: 16.09 KG/M2

## 2022-01-01 DIAGNOSIS — J69.0 ASPIRATION PNEUMONIA OF RIGHT LOWER LOBE, UNSPECIFIED ASPIRATION PNEUMONIA TYPE (HCC): ICD-10-CM

## 2022-01-01 DIAGNOSIS — J96.01 ACUTE RESPIRATORY FAILURE WITH HYPOXIA (HCC): Primary | ICD-10-CM

## 2022-01-01 LAB
ALBUMIN SERPL-MCNC: 4 G/DL (ref 3.5–5.2)
ALP BLD-CCNC: 89 U/L (ref 35–104)
ALT SERPL-CCNC: 14 U/L (ref 0–32)
ANION GAP SERPL CALCULATED.3IONS-SCNC: 13 MMOL/L (ref 7–16)
ANION GAP SERPL CALCULATED.3IONS-SCNC: 9 MMOL/L (ref 7–16)
AST SERPL-CCNC: 19 U/L (ref 0–31)
ATYPICAL LYMPHOCYTE RELATIVE PERCENT: 0.9 % (ref 0–4)
BACTERIA: ABNORMAL /HPF
BASOPHILS ABSOLUTE: 0 E9/L (ref 0–0.2)
BASOPHILS ABSOLUTE: 0 E9/L (ref 0–0.2)
BASOPHILS RELATIVE PERCENT: 0 % (ref 0–2)
BASOPHILS RELATIVE PERCENT: 0 % (ref 0–2)
BILIRUB SERPL-MCNC: 1.1 MG/DL (ref 0–1.2)
BILIRUBIN URINE: NEGATIVE
BLOOD, URINE: ABNORMAL
BUN BLDV-MCNC: 35 MG/DL (ref 6–23)
BUN BLDV-MCNC: 35 MG/DL (ref 6–23)
BURR CELLS: ABNORMAL
BURR CELLS: ABNORMAL
CALCIUM SERPL-MCNC: 10.2 MG/DL (ref 8.6–10.2)
CALCIUM SERPL-MCNC: 10.4 MG/DL (ref 8.6–10.2)
CHLORIDE BLD-SCNC: 106 MMOL/L (ref 98–107)
CHLORIDE BLD-SCNC: 109 MMOL/L (ref 98–107)
CLARITY: CLEAR
CO2: 24 MMOL/L (ref 22–29)
CO2: 26 MMOL/L (ref 22–29)
COLOR: YELLOW
CREAT SERPL-MCNC: 0.7 MG/DL (ref 0.5–1)
CREAT SERPL-MCNC: 0.8 MG/DL (ref 0.5–1)
EKG ATRIAL RATE: 102 BPM
EKG Q-T INTERVAL: 294 MS
EKG QRS DURATION: 88 MS
EKG QTC CALCULATION (BAZETT): 399 MS
EKG R AXIS: -34 DEGREES
EKG T AXIS: -73 DEGREES
EKG VENTRICULAR RATE: 111 BPM
EOSINOPHILS ABSOLUTE: 0 E9/L (ref 0.05–0.5)
EOSINOPHILS ABSOLUTE: 0 E9/L (ref 0.05–0.5)
EOSINOPHILS RELATIVE PERCENT: 0 % (ref 0–6)
EOSINOPHILS RELATIVE PERCENT: 0 % (ref 0–6)
GFR SERPL CREATININE-BSD FRML MDRD: >60 ML/MIN/1.73
GFR SERPL CREATININE-BSD FRML MDRD: >60 ML/MIN/1.73
GLUCOSE BLD-MCNC: 120 MG/DL (ref 74–99)
GLUCOSE BLD-MCNC: 177 MG/DL (ref 74–99)
GLUCOSE URINE: NEGATIVE MG/DL
HCT VFR BLD CALC: 41.5 % (ref 34–48)
HCT VFR BLD CALC: 44.1 % (ref 34–48)
HEMOGLOBIN: 13.6 G/DL (ref 11.5–15.5)
HEMOGLOBIN: 14.3 G/DL (ref 11.5–15.5)
INFLUENZA A BY PCR: NOT DETECTED
INFLUENZA B BY PCR: NOT DETECTED
KETONES, URINE: ABNORMAL MG/DL
L. PNEUMOPHILA SEROGP 1 UR AG: NORMAL
LACTIC ACID, SEPSIS: 1.7 MMOL/L (ref 0.5–1.9)
LEUKOCYTE ESTERASE, URINE: NEGATIVE
LYMPHOCYTES ABSOLUTE: 0.38 E9/L (ref 1.5–4)
LYMPHOCYTES ABSOLUTE: 0.41 E9/L (ref 1.5–4)
LYMPHOCYTES RELATIVE PERCENT: 0.9 % (ref 20–42)
LYMPHOCYTES RELATIVE PERCENT: 2.6 % (ref 20–42)
MCH RBC QN AUTO: 31.6 PG (ref 26–35)
MCH RBC QN AUTO: 32 PG (ref 26–35)
MCHC RBC AUTO-ENTMCNC: 32.4 % (ref 32–34.5)
MCHC RBC AUTO-ENTMCNC: 32.8 % (ref 32–34.5)
MCV RBC AUTO: 96.5 FL (ref 80–99.9)
MCV RBC AUTO: 98.7 FL (ref 80–99.9)
MONOCYTES ABSOLUTE: 0.51 E9/L (ref 0.1–0.95)
MONOCYTES ABSOLUTE: 0.83 E9/L (ref 0.1–0.95)
MONOCYTES RELATIVE PERCENT: 4.3 % (ref 2–12)
MONOCYTES RELATIVE PERCENT: 4.4 % (ref 2–12)
NEUTROPHILS ABSOLUTE: 11.81 E9/L (ref 1.8–7.3)
NEUTROPHILS ABSOLUTE: 19.46 E9/L (ref 1.8–7.3)
NEUTROPHILS RELATIVE PERCENT: 93 % (ref 43–80)
NEUTROPHILS RELATIVE PERCENT: 93.9 % (ref 43–80)
NITRITE, URINE: NEGATIVE
NUCLEATED RED BLOOD CELLS: 0 /100 WBC
NUCLEATED RED BLOOD CELLS: 0 /100 WBC
ORGANISM: ABNORMAL
ORGANISM: ABNORMAL
OVALOCYTES: ABNORMAL
OVALOCYTES: ABNORMAL
PDW BLD-RTO: 14.4 FL (ref 11.5–15)
PDW BLD-RTO: 14.5 FL (ref 11.5–15)
PH UA: 6 (ref 5–9)
PLATELET # BLD: 166 E9/L (ref 130–450)
PLATELET # BLD: 168 E9/L (ref 130–450)
PMV BLD AUTO: 11.5 FL (ref 7–12)
PMV BLD AUTO: 11.5 FL (ref 7–12)
POIKILOCYTES: ABNORMAL
POIKILOCYTES: ABNORMAL
POLYCHROMASIA: ABNORMAL
POLYCHROMASIA: ABNORMAL
POTASSIUM REFLEX MAGNESIUM: 4 MMOL/L (ref 3.5–5)
POTASSIUM REFLEX MAGNESIUM: 4.1 MMOL/L (ref 3.5–5)
PRO-BNP: ABNORMAL PG/ML (ref 0–450)
PROCALCITONIN: 0.11 NG/ML (ref 0–0.08)
PROTEIN UA: 100 MG/DL
RBC # BLD: 4.3 E12/L (ref 3.5–5.5)
RBC # BLD: 4.47 E12/L (ref 3.5–5.5)
RBC UA: ABNORMAL /HPF (ref 0–2)
SARS-COV-2, NAAT: NOT DETECTED
SODIUM BLD-SCNC: 143 MMOL/L (ref 132–146)
SODIUM BLD-SCNC: 144 MMOL/L (ref 132–146)
SPECIFIC GRAVITY UA: >=1.03 (ref 1–1.03)
SPHEROCYTES: ABNORMAL
STREP PNEUMONIAE ANTIGEN, URINE: NORMAL
TOTAL PROTEIN: 6.7 G/DL (ref 6.4–8.3)
TROPONIN, HIGH SENSITIVITY: 51 NG/L (ref 0–9)
TROPONIN, HIGH SENSITIVITY: 54 NG/L (ref 0–9)
URINE CULTURE, ROUTINE: ABNORMAL
URINE CULTURE, ROUTINE: ABNORMAL
UROBILINOGEN, URINE: 0.2 E.U./DL
WBC # BLD: 12.7 E9/L (ref 4.5–11.5)
WBC # BLD: 20.7 E9/L (ref 4.5–11.5)
WBC UA: ABNORMAL /HPF (ref 0–5)

## 2022-01-01 PROCEDURE — 71045 X-RAY EXAM CHEST 1 VIEW: CPT

## 2022-01-01 PROCEDURE — 6360000002 HC RX W HCPCS: Performed by: STUDENT IN AN ORGANIZED HEALTH CARE EDUCATION/TRAINING PROGRAM

## 2022-01-01 PROCEDURE — 2580000003 HC RX 258: Performed by: NURSE PRACTITIONER

## 2022-01-01 PROCEDURE — 87449 NOS EACH ORGANISM AG IA: CPT

## 2022-01-01 PROCEDURE — 36415 COLL VENOUS BLD VENIPUNCTURE: CPT

## 2022-01-01 PROCEDURE — 80053 COMPREHEN METABOLIC PANEL: CPT

## 2022-01-01 PROCEDURE — 6360000002 HC RX W HCPCS: Performed by: NURSE PRACTITIONER

## 2022-01-01 PROCEDURE — 83880 ASSAY OF NATRIURETIC PEPTIDE: CPT

## 2022-01-01 PROCEDURE — 97161 PT EVAL LOW COMPLEX 20 MIN: CPT

## 2022-01-01 PROCEDURE — 6370000000 HC RX 637 (ALT 250 FOR IP): Performed by: NURSE PRACTITIONER

## 2022-01-01 PROCEDURE — 85025 COMPLETE CBC W/AUTO DIFF WBC: CPT

## 2022-01-01 PROCEDURE — 84145 PROCALCITONIN (PCT): CPT

## 2022-01-01 PROCEDURE — 94640 AIRWAY INHALATION TREATMENT: CPT

## 2022-01-01 PROCEDURE — 96365 THER/PROPH/DIAG IV INF INIT: CPT

## 2022-01-01 PROCEDURE — 2580000003 HC RX 258: Performed by: STUDENT IN AN ORGANIZED HEALTH CARE EDUCATION/TRAINING PROGRAM

## 2022-01-01 PROCEDURE — 87502 INFLUENZA DNA AMP PROBE: CPT

## 2022-01-01 PROCEDURE — 97165 OT EVAL LOW COMPLEX 30 MIN: CPT

## 2022-01-01 PROCEDURE — 80048 BASIC METABOLIC PNL TOTAL CA: CPT

## 2022-01-01 PROCEDURE — 81001 URINALYSIS AUTO W/SCOPE: CPT

## 2022-01-01 PROCEDURE — 87635 SARS-COV-2 COVID-19 AMP PRB: CPT

## 2022-01-01 PROCEDURE — 51798 US URINE CAPACITY MEASURE: CPT

## 2022-01-01 PROCEDURE — 99285 EMERGENCY DEPT VISIT HI MDM: CPT

## 2022-01-01 PROCEDURE — 2500000003 HC RX 250 WO HCPCS: Performed by: NURSE PRACTITIONER

## 2022-01-01 PROCEDURE — 96375 TX/PRO/DX INJ NEW DRUG ADDON: CPT

## 2022-01-01 PROCEDURE — 83605 ASSAY OF LACTIC ACID: CPT

## 2022-01-01 PROCEDURE — 87040 BLOOD CULTURE FOR BACTERIA: CPT

## 2022-01-01 PROCEDURE — 2700000000 HC OXYGEN THERAPY PER DAY

## 2022-01-01 PROCEDURE — 2060000000 HC ICU INTERMEDIATE R&B

## 2022-01-01 PROCEDURE — 84484 ASSAY OF TROPONIN QUANT: CPT

## 2022-01-01 PROCEDURE — 93005 ELECTROCARDIOGRAM TRACING: CPT | Performed by: STUDENT IN AN ORGANIZED HEALTH CARE EDUCATION/TRAINING PROGRAM

## 2022-01-01 PROCEDURE — 2500000003 HC RX 250 WO HCPCS: Performed by: STUDENT IN AN ORGANIZED HEALTH CARE EDUCATION/TRAINING PROGRAM

## 2022-01-01 PROCEDURE — 94664 DEMO&/EVAL PT USE INHALER: CPT

## 2022-01-01 PROCEDURE — 51702 INSERT TEMP BLADDER CATH: CPT

## 2022-01-01 RX ORDER — SODIUM CHLORIDE 9 MG/ML
INJECTION, SOLUTION INTRAVENOUS PRN
Status: DISCONTINUED | OUTPATIENT
Start: 2022-01-01 | End: 2022-01-01 | Stop reason: HOSPADM

## 2022-01-01 RX ORDER — ONDANSETRON 2 MG/ML
4 INJECTION INTRAMUSCULAR; INTRAVENOUS EVERY 6 HOURS PRN
Status: DISCONTINUED | OUTPATIENT
Start: 2022-01-01 | End: 2022-01-01 | Stop reason: HOSPADM

## 2022-01-01 RX ORDER — BUDESONIDE 0.5 MG/2ML
1000 INHALANT ORAL 2 TIMES DAILY
Status: DISCONTINUED | OUTPATIENT
Start: 2022-01-01 | End: 2022-01-01 | Stop reason: HOSPADM

## 2022-01-01 RX ORDER — FENTANYL CITRATE 50 UG/ML
25 INJECTION, SOLUTION INTRAMUSCULAR; INTRAVENOUS ONCE
Status: COMPLETED | OUTPATIENT
Start: 2022-01-01 | End: 2022-01-01

## 2022-01-01 RX ORDER — MEGESTROL ACETATE 40 MG/ML
800 SUSPENSION ORAL DAILY
Status: DISCONTINUED | OUTPATIENT
Start: 2022-01-01 | End: 2022-01-01 | Stop reason: HOSPADM

## 2022-01-01 RX ORDER — ACETAMINOPHEN 325 MG/1
650 TABLET ORAL EVERY 4 HOURS PRN
COMMUNITY

## 2022-01-01 RX ORDER — ISOSORBIDE MONONITRATE 60 MG/1
60 TABLET, EXTENDED RELEASE ORAL DAILY
Status: DISCONTINUED | OUTPATIENT
Start: 2022-01-01 | End: 2022-01-01 | Stop reason: HOSPADM

## 2022-01-01 RX ORDER — FORMOTEROL FUMARATE 20 UG/2ML
20 SOLUTION RESPIRATORY (INHALATION) 2 TIMES DAILY
Status: DISCONTINUED | OUTPATIENT
Start: 2022-01-01 | End: 2022-01-01 | Stop reason: CLARIF

## 2022-01-01 RX ORDER — IPRATROPIUM BROMIDE AND ALBUTEROL SULFATE 2.5; .5 MG/3ML; MG/3ML
1 SOLUTION RESPIRATORY (INHALATION)
Status: DISCONTINUED | OUTPATIENT
Start: 2022-01-01 | End: 2022-01-01 | Stop reason: HOSPADM

## 2022-01-01 RX ORDER — 0.9 % SODIUM CHLORIDE 0.9 %
500 INTRAVENOUS SOLUTION INTRAVENOUS ONCE
Status: COMPLETED | OUTPATIENT
Start: 2022-01-01 | End: 2022-01-01

## 2022-01-01 RX ORDER — MORPHINE SULFATE 2 MG/ML
2 INJECTION, SOLUTION INTRAMUSCULAR; INTRAVENOUS
Status: DISCONTINUED | OUTPATIENT
Start: 2022-01-01 | End: 2022-01-01 | Stop reason: HOSPADM

## 2022-01-01 RX ORDER — ARFORMOTEROL TARTRATE 15 UG/2ML
15 SOLUTION RESPIRATORY (INHALATION) 2 TIMES DAILY
Status: DISCONTINUED | OUTPATIENT
Start: 2022-01-01 | End: 2022-01-01 | Stop reason: HOSPADM

## 2022-01-01 RX ORDER — LORAZEPAM 2 MG/ML
0.5 INJECTION INTRAMUSCULAR EVERY 4 HOURS PRN
Status: DISCONTINUED | OUTPATIENT
Start: 2022-01-01 | End: 2022-01-01 | Stop reason: HOSPADM

## 2022-01-01 RX ORDER — ACETAMINOPHEN 325 MG/1
650 TABLET ORAL EVERY 6 HOURS PRN
Status: DISCONTINUED | OUTPATIENT
Start: 2022-01-01 | End: 2022-01-01 | Stop reason: HOSPADM

## 2022-01-01 RX ORDER — MEGESTROL ACETATE 40 MG/ML
800 SUSPENSION ORAL DAILY
COMMUNITY
Start: 2022-01-01 | End: 2022-11-28

## 2022-01-01 RX ORDER — MORPHINE SULFATE 10 MG/5ML
5 SOLUTION ORAL 4 TIMES DAILY
Status: DISCONTINUED | OUTPATIENT
Start: 2022-01-01 | End: 2022-01-01 | Stop reason: HOSPADM

## 2022-01-01 RX ORDER — BUMETANIDE 0.25 MG/ML
0.5 INJECTION, SOLUTION INTRAMUSCULAR; INTRAVENOUS 2 TIMES DAILY
Status: DISCONTINUED | OUTPATIENT
Start: 2022-01-01 | End: 2022-01-01

## 2022-01-01 RX ORDER — PROMETHAZINE HYDROCHLORIDE 25 MG/1
25 TABLET ORAL EVERY 6 HOURS PRN
COMMUNITY

## 2022-01-01 RX ORDER — GLYCOPYRROLATE 0.2 MG/ML
0.2 INJECTION INTRAMUSCULAR; INTRAVENOUS EVERY 4 HOURS PRN
Status: DISCONTINUED | OUTPATIENT
Start: 2022-01-01 | End: 2022-01-01 | Stop reason: HOSPADM

## 2022-01-01 RX ORDER — LOPERAMIDE HYDROCHLORIDE 2 MG/1
2 CAPSULE ORAL EVERY 4 HOURS PRN
COMMUNITY

## 2022-01-01 RX ORDER — ACETAMINOPHEN 650 MG/1
650 SUPPOSITORY RECTAL EVERY 6 HOURS PRN
Status: DISCONTINUED | OUTPATIENT
Start: 2022-01-01 | End: 2022-01-01 | Stop reason: HOSPADM

## 2022-01-01 RX ORDER — SODIUM CHLORIDE 0.9 % (FLUSH) 0.9 %
5-40 SYRINGE (ML) INJECTION EVERY 12 HOURS SCHEDULED
Status: DISCONTINUED | OUTPATIENT
Start: 2022-01-01 | End: 2022-01-01 | Stop reason: HOSPADM

## 2022-01-01 RX ORDER — MORPHINE SULFATE 2 MG/ML
1 INJECTION, SOLUTION INTRAMUSCULAR; INTRAVENOUS
Status: DISCONTINUED | OUTPATIENT
Start: 2022-01-01 | End: 2022-01-01 | Stop reason: HOSPADM

## 2022-01-01 RX ORDER — ONDANSETRON 4 MG/1
4 TABLET, ORALLY DISINTEGRATING ORAL EVERY 8 HOURS PRN
Status: DISCONTINUED | OUTPATIENT
Start: 2022-01-01 | End: 2022-01-01 | Stop reason: HOSPADM

## 2022-01-01 RX ORDER — LEVOTHYROXINE SODIUM 0.03 MG/1
25 TABLET ORAL EVERY MORNING
Status: DISCONTINUED | OUTPATIENT
Start: 2022-01-01 | End: 2022-01-01 | Stop reason: HOSPADM

## 2022-01-01 RX ORDER — SODIUM CHLORIDE 0.9 % (FLUSH) 0.9 %
10 SYRINGE (ML) INJECTION PRN
Status: DISCONTINUED | OUTPATIENT
Start: 2022-01-01 | End: 2022-01-01 | Stop reason: HOSPADM

## 2022-01-01 RX ORDER — ACETAMINOPHEN 325 MG/1
650 TABLET ORAL EVERY 4 HOURS PRN
Status: DISCONTINUED | OUTPATIENT
Start: 2022-01-01 | End: 2022-01-01 | Stop reason: HOSPADM

## 2022-01-01 RX ORDER — BISACODYL 10 MG
10 SUPPOSITORY, RECTAL RECTAL DAILY PRN
COMMUNITY

## 2022-01-01 RX ORDER — MORPHINE SULFATE 100 MG/5ML
5 SOLUTION ORAL 4 TIMES DAILY
COMMUNITY

## 2022-01-01 RX ORDER — ENOXAPARIN SODIUM 100 MG/ML
30 INJECTION SUBCUTANEOUS DAILY
Status: DISCONTINUED | OUTPATIENT
Start: 2022-01-01 | End: 2022-01-01 | Stop reason: HOSPADM

## 2022-01-01 RX ORDER — METHYLPREDNISOLONE 4 MG/1
2 TABLET ORAL EVERY MORNING
COMMUNITY

## 2022-01-01 RX ADMIN — ENOXAPARIN SODIUM 30 MG: 100 INJECTION SUBCUTANEOUS at 08:47

## 2022-01-01 RX ADMIN — MEROPENEM 1000 MG: 1 INJECTION, POWDER, FOR SOLUTION INTRAVENOUS at 01:35

## 2022-01-01 RX ADMIN — SODIUM CHLORIDE, PRESERVATIVE FREE 10 ML: 5 INJECTION INTRAVENOUS at 20:51

## 2022-01-01 RX ADMIN — SODIUM CHLORIDE 500 ML: 9 INJECTION, SOLUTION INTRAVENOUS at 14:50

## 2022-01-01 RX ADMIN — METOPROLOL TARTRATE 12.5 MG: 25 TABLET, FILM COATED ORAL at 20:42

## 2022-01-01 RX ADMIN — FENTANYL CITRATE 25 MCG: 0.05 INJECTION, SOLUTION INTRAMUSCULAR; INTRAVENOUS at 10:27

## 2022-01-01 RX ADMIN — ARFORMOTEROL TARTRATE 15 MCG: 15 SOLUTION RESPIRATORY (INHALATION) at 09:36

## 2022-01-01 RX ADMIN — MORPHINE SULFATE 2 MG: 2 INJECTION, SOLUTION INTRAMUSCULAR; INTRAVENOUS at 12:27

## 2022-01-01 RX ADMIN — SODIUM CHLORIDE, PRESERVATIVE FREE 10 ML: 5 INJECTION INTRAVENOUS at 08:47

## 2022-01-01 RX ADMIN — MORPHINE SULFATE 2 MG: 2 INJECTION, SOLUTION INTRAMUSCULAR; INTRAVENOUS at 14:45

## 2022-01-01 RX ADMIN — DOXYCYCLINE 100 MG: 100 INJECTION, POWDER, LYOPHILIZED, FOR SOLUTION INTRAVENOUS at 13:53

## 2022-01-01 RX ADMIN — BUDESONIDE 1000 MCG: 0.5 SUSPENSION RESPIRATORY (INHALATION) at 09:36

## 2022-01-01 RX ADMIN — MORPHINE SULFATE 5 MG: 10 SOLUTION ORAL at 17:42

## 2022-01-01 RX ADMIN — ENOXAPARIN SODIUM 30 MG: 100 INJECTION SUBCUTANEOUS at 17:31

## 2022-01-01 RX ADMIN — BUMETANIDE 0.5 MG: 0.25 INJECTION, SOLUTION INTRAMUSCULAR; INTRAVENOUS at 11:21

## 2022-01-01 RX ADMIN — MORPHINE SULFATE 5 MG: 10 SOLUTION ORAL at 20:42

## 2022-01-01 RX ADMIN — MEROPENEM 1000 MG: 1 INJECTION, POWDER, FOR SOLUTION INTRAVENOUS at 13:11

## 2022-01-01 ASSESSMENT — PAIN - FUNCTIONAL ASSESSMENT: PAIN_FUNCTIONAL_ASSESSMENT: ACTIVITIES ARE NOT PREVENTED

## 2022-01-01 ASSESSMENT — PAIN SCALES - WONG BAKER
WONGBAKER_NUMERICALRESPONSE: 4
WONGBAKER_NUMERICALRESPONSE: 2

## 2022-01-01 ASSESSMENT — PAIN SCALES - GENERAL
PAINLEVEL_OUTOF10: 4
PAINLEVEL_OUTOF10: 8
PAINLEVEL_OUTOF10: 0
PAINLEVEL_OUTOF10: 10
PAINLEVEL_OUTOF10: 0
PAINLEVEL_OUTOF10: 10

## 2022-01-01 ASSESSMENT — PAIN DESCRIPTION - LOCATION
LOCATION: GENERALIZED
LOCATION: BACK
LOCATION: GENERALIZED

## 2022-01-01 ASSESSMENT — PAIN DESCRIPTION - DESCRIPTORS
DESCRIPTORS: ACHING;DISCOMFORT
DESCRIPTORS: ACHING;DISCOMFORT

## 2022-01-17 PROBLEM — R77.8 ELEVATED TROPONIN: Status: RESOLVED | Noted: 2021-12-18 | Resolved: 2022-01-17

## 2022-01-17 PROBLEM — N39.0 UTI (URINARY TRACT INFECTION): Status: RESOLVED | Noted: 2019-12-15 | Resolved: 2022-01-17

## 2022-11-02 PROBLEM — J69.0 ASPIRATION PNEUMONIA, UNSPECIFIED ASPIRATION PNEUMONIA TYPE, UNSPECIFIED LATERALITY, UNSPECIFIED PART OF LUNG (HCC): Status: ACTIVE | Noted: 2022-01-01

## 2022-11-02 NOTE — PROGRESS NOTES
Database initiated. Patient is from [address] [place] [address] she is alert to self. She is a total and requires a wheelchair at the facility. She was originally ordered pureed foods with thickened liquids but she refused to eat so family signed papers to allow her regular consistency foods.

## 2022-11-02 NOTE — ED NOTES
Skin prepped with alcohol for 30 seconds. The top of blood culture bottles cleaned with alcohol, patients skin scrubbed for 30 seconds using chlor-prep, aseptic techniques utilized to obtain cultures. Patient tolerated well. Blood cultures sent to lab for processing. Difficulty getting second set of cultures.       Trinity Greenberg RN  11/02/22 [id number]

## 2022-11-02 NOTE — ED PROVIDER NOTES
[facility]  Department of Emergency Medicine     Written by: Mary Nickerson DO  Patient Name: Nicolas Mcgrath  Attending Provider: No att. providers found  Admit Date: 2022  9:24 AM  MRN: [id number]                   : 1928        Chief Complaint   Patient presents with    Shortness of Breath    - Chief complaint    Patient is a 63-year-old female past medical history of hypertension, CAD, A. fib, CHF and CKD. Patient presents with chief complaint of cough and shortness of breath. Patient currently resides at a skilled nursing facility. According to reports patient's had gradual worsening cough and shortness of breath for the last few days. She was found to be hypoxic today with oxygen sats in the 80s on room air. She was placed on nasal cannula with improvement in symptoms. On arrival patient is awake alert oriented to person but not place time and condition. She does complain of a cough productive of yellow sputum. Symptoms. Be moderate in severity and constant since onset. There are no exacerbating or relieving factors noted. Patient's daughter did arrive to the bedside shortly thereafter and states the patient has had issues with aspiration in the past.  Further history review of systems limited secondary to patient's mental status. The history is provided by the patient, the EMS personnel and a relative. No  was used. Review of Systems   Unable to perform ROS: Dementia      Physical Exam  Vitals and nursing note reviewed. Constitutional:       General: She is not in acute distress. Appearance: Normal appearance. HENT:      Head: Normocephalic and atraumatic. Nose: No congestion or rhinorrhea. Mouth/Throat:      Mouth: Mucous membranes are moist.      Pharynx: Oropharynx is clear. Eyes:      Extraocular Movements: Extraocular movements intact. Pupils: Pupils are equal, round, and reactive to light.    Cardiovascular: Rate and Rhythm: Tachycardia present. Rhythm irregular. Heart sounds: No murmur heard. No gallop. Pulmonary:      Effort: Pulmonary effort is normal. No respiratory distress. Breath sounds: Examination of the right-lower field reveals rhonchi. Rhonchi present. No wheezing or rales. Abdominal:      General: Abdomen is flat. Palpations: Abdomen is soft. There is no mass. Tenderness: There is no abdominal tenderness. There is no guarding. Hernia: No hernia is present. Musculoskeletal:         General: No swelling, tenderness or signs of injury. Normal range of motion. Cervical back: Normal range of motion. No rigidity. No muscular tenderness. Skin:     General: Skin is warm and dry. Capillary Refill: Capillary refill takes less than 2 seconds. Neurological:      General: No focal deficit present. Mental Status: She is alert and oriented to person, place, and time. Mental status is at baseline. Psychiatric:         Mood and Affect: Mood normal.         Behavior: Behavior normal.        Procedures       MDM  Number of Diagnoses or Management Options  Acute respiratory failure with hypoxia (HCC)  Aspiration pneumonia of right lower lobe, unspecified aspiration pneumonia type [facility])  Diagnosis management comments: Patient is a 70-year-old female past medical history of hypertension, CAD, A. fib, CHF and CKD. Patient presents with chief complaint of cough and shortness of breath. Vital signs stable presentation except for mildly elevated blood pressure as well as hypoxia patient placed on 3 L nasal cannula with improvement. On physical exam heart irregular rhythm with regular rate, lungs with rhonchi to the right lower lobe, abdomen soft nontender no rigidity rebound or guarding. EKG obtained demonstrate atrial for with rapid ventricular response no acute ischemic changes.   Laboratory work obtained CBC demonstrate leukocytosis of 20.7, CMP obtained demonstrated no acute abnormalities, proBNP elevated 27,000, troponin 54 and repeat 51, lactic acid 1.7. COVID-negative, flu negative. Chest x-ray obtained demonstrated right perihilar and right lower lobe pneumonia. Patient was complaining of generalized body aches given 25 mcg of fentanyl with significant provement symptoms. Finds consistent with respiratory failure secondary to likely aspiration pneumonia. Patient is DNR CCA family would like to continue treatment at this time. 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 Patient also given small IV fluid bolus. On repeat evaluation blood pressure significantly improved patient is resting more comfortably in bed she continues to require nasal cannula for oxygen saturation. She was made to admit patient. Case discussed with TONYA who agreed to admit patient. Plan of care discussed with patient and family, all questions were answered, they were in agreement plan of care and patient was admitted to the hospital in stable condition.        Amount and/or Complexity of Data Reviewed  Clinical lab tests: ordered and reviewed  Tests in the radiology section of CPT®: reviewed  Decide to obtain previous medical records or to obtain history from someone other than the patient: yes    Risk of Complications, Morbidity, and/or Mortality  Presenting problems: moderate  Diagnostic procedures: moderate  Management options: moderate    Patient Progress  Patient progress: stable             --------------------------------------------- PAST HISTORY ---------------------------------------------  Past Medical History:  has a past medical history of A-fib ([facility] [address] Arthritis, Atrial fibrillation ([facility] [address] Blood transfusion, Blood transfusion reaction, CAD (coronary artery disease), Cancer ([facility] [address] CHF (congestive heart failure) ([facility] [address] Chronic back pain, Chronic kidney disease, GERD (gastroesophageal reflux disease), GI bleed, History of blood transfusion, Hyperlipidemia, Hypertension, Pacemaker, Pneumonia, Psychiatric problem, Syncope, and Thyroid disease. Past Surgical History:  has a past surgical history that includes Cardiac pacemaker placement (3/8/11); skin biopsy; ECHO Compl W Dop Color Flow (5/23/2012); Coronary artery bypass graft; Coronary angioplasty with stent (2009); Colonoscopy; Tonsillectomy; Appendectomy; Hysterectomy; Endoscopy, colon, diagnostic; Cardioversion (06/28/2012); Cardiac catheterization (03/13/2014); Breast surgery; eye surgery; pacemaker placement; Colonoscopy (10/3/2018); and pr lap,surg,colectomy, partial, w/anast (N/A, 10/5/2018). Social History:  reports that she has never smoked. She has never used smokeless tobacco. She reports that she does not drink alcohol and does not use drugs. Family History: family history includes Cancer in her sister; Heart Disease in her brother, father, and mother; Stroke in her brother and father. The patients home medications have been reviewed.     Allergies: Latex, Sulfa antibiotics, Adhesive tape, Bactrim, and Pcn [penicillins]    -------------------------------------------------- RESULTS -------------------------------------------------    LABS:  Results for orders placed or performed during the hospital encounter of 11/02/22   COVID-19, Rapid    Specimen: Nasopharyngeal Swab   Result Value Ref Range    SARS-CoV-2, NAAT Not Detected Not Detected   Rapid influenza A/B antigens    Specimen: Nasopharyngeal   Result Value Ref Range    Influenza A by PCR Not Detected Not Detected    Influenza B by PCR Not Detected Not Detected   Culture, Blood 1    Specimen: Blood   Result Value Ref Range    Blood Culture, Routine 24 Hours no growth    Culture, Blood 2    Specimen: Blood   Result Value Ref Range    Culture, Blood 2 24 Hours no growth    Legionella antigen, urine    Specimen: Urine, clean catch   Result Value Ref Range    L. pneumophila Serogp 1 Ur Ag       Presumptive Negative -suggesting no recent or current infections  with Legionella pneumophila serogroup 1. Infection to Legionella cannot be ruled out since other serogroups  and species may cause infection, antigen may not be present in  early infection, or level of antigen may be below the  detection limit. Normal Range: Presumptive Negative     Strep Pneumoniae Antigen    Specimen: Urine, clean catch   Result Value Ref Range    STREP PNEUMONIAE ANTIGEN, URINE       Presumptive negative- suggests no current or recent  pneumococcal infection.   Infection due to Strep pneumoniae cannot be  ruled out since the antigen present in the sample  may be below the detection limit of the test.  Normal Range:Presumptive Negative     CBC with Auto Differential   Result Value Ref Range    WBC 20.7 (H) 4.5 - 11.5 E9/L    RBC 4.30 3.50 - 5.50 E12/L    Hemoglobin 13.6 11.5 - 15.5 g/dL    Hematocrit 41.5 34.0 - 48.0 %    MCV 96.5 80.0 - 99.9 fL    MCH 31.6 26.0 - 35.0 pg    MCHC 32.8 32.0 - 34.5 %    RDW 14.4 11.5 - 15.0 fL    Platelets 287 225 - 062 E9/L    MPV 11.5 7.0 - 12.0 fL    Neutrophils % 93.9 (H) 43.0 - 80.0 %    Lymphocytes % 0.9 (L) 20.0 - 42.0 %    Monocytes % 4.3 2.0 - 12.0 %    Eosinophils % 0.0 0.0 - 6.0 %    Basophils % 0.0 0.0 - 2.0 %    Neutrophils Absolute 19.46 (H) 1.80 - 7.30 E9/L    Lymphocytes Absolute 0.41 (L) 1.50 - 4.00 E9/L    Monocytes Absolute 0.83 0.10 - 0.95 E9/L    Eosinophils Absolute 0.00 (L) 0.05 - 0.50 E9/L    Basophils Absolute 0.00 0.00 - 0.20 E9/L    Atypical Lymphocytes Relative 0.9 0.0 - 4.0 %    nRBC 0.0 /100 WBC    Polychromasia 1+     Poikilocytes 2+     Sabine Cells 2+     Ovalocytes 1+    Comprehensive Metabolic Panel w/ Reflex to MG   Result Value Ref Range    Sodium 143 132 - 146 mmol/L    Potassium reflex Magnesium 4.0 3.5 - 5.0 mmol/L    Chloride 106 98 - 107 mmol/L    CO2 24 22 - 29 mmol/L    Anion Gap 13 7 - 16 mmol/L    Glucose 177 (H) 74 - 99 mg/dL    BUN 35 (H) 6 - 23 mg/dL    Creatinine 0.8 0.5 - 1.0 mg/dL    Est, Glom Filt Rate >60 >=60 mL/min/1.73    Calcium 10.4 (H) 8.6 - 10.2 mg/dL    Total Protein 6.7 6.4 - 8.3 g/dL    Albumin 4.0 3.5 - 5.2 g/dL    Total Bilirubin 1.1 0.0 - 1.2 mg/dL    Alkaline Phosphatase 89 35 - 104 U/L    ALT 14 0 - 32 U/L    AST 19 0 - 31 U/L   Troponin   Result Value Ref Range    Troponin, High Sensitivity 54 (H) 0 - 9 ng/L   Brain Natriuretic Peptide   Result Value Ref Range    Pro-BNP 27,560 (H) 0 - 450 pg/mL   Troponin   Result Value Ref Range    Troponin, High Sensitivity 51 (H) 0 - 9 ng/L   Lactate, Sepsis   Result Value Ref Range    Lactic Acid, Sepsis 1.7 0.5 - 1.9 mmol/L   Procalcitonin   Result Value Ref Range    Procalcitonin 0.11 (H) 0.00 - 0.08 ng/mL   CBC with Auto Differential   Result Value Ref Range    WBC 12.7 (H) 4.5 - 11.5 E9/L    RBC 4.47 3.50 - 5.50 E12/L    Hemoglobin 14.3 11.5 - 15.5 g/dL    Hematocrit 44.1 34.0 - 48.0 %    MCV 98.7 80.0 - 99.9 fL    MCH 32.0 26.0 - 35.0 pg    MCHC 32.4 32.0 - 34.5 %    RDW 14.5 11.5 - 15.0 fL    Platelets 692 645 - 556 E9/L    MPV 11.5 7.0 - 12.0 fL    Neutrophils % 93.0 (H) 43.0 - 80.0 %    Lymphocytes % 2.6 (L) 20.0 - 42.0 %    Monocytes % 4.4 2.0 - 12.0 %    Eosinophils % 0.0 0.0 - 6.0 %    Basophils % 0.0 0.0 - 2.0 %    Neutrophils Absolute 11.81 (H) 1.80 - 7.30 E9/L    Lymphocytes Absolute 0.38 (L) 1.50 - 4.00 E9/L    Monocytes Absolute 0.51 0.10 - 0.95 E9/L    Eosinophils Absolute 0.00 (L) 0.05 - 0.50 E9/L    Basophils Absolute 0.00 0.00 - 0.20 E9/L    nRBC 0.0 /100 WBC    Polychromasia 1+     Poikilocytes 2+     [place] Cells 1+     Ovalocytes 2+     Spherocytes 2+    Basic Metabolic Panel w/ Reflex to MG   Result Value Ref Range    Sodium 144 132 - 146 mmol/L    Potassium reflex Magnesium 4.1 3.5 - 5.0 mmol/L    Chloride 109 (H) 98 - 107 mmol/L    CO2 26 22 - 29 mmol/L    Anion Gap 9 7 - 16 mmol/L    Glucose 120 (H) 74 - 99 mg/dL    BUN 35 (H) 6 - 23 mg/dL    Creatinine 0.7 0.5 - 1.0 mg/dL    Est, Glom Filt Rate >60 >=60 mL/min/1.73 Calcium 10.2 8.6 - 10.2 mg/dL   Urinalysis with Microscopic   Result Value Ref Range    Color, UA Yellow Straw/Yellow    Clarity, UA Clear Clear    Glucose, Ur Negative Negative mg/dL    Bilirubin Urine Negative Negative    Ketones, Urine TRACE (A) Negative mg/dL    Specific Gravity, UA >=1.030 1.005 - 1.030    Blood, Urine TRACE-LYSED Negative    pH, UA 6.0 5.0 - 9.0    Protein,  (A) Negative mg/dL    Urobilinogen, Urine 0.2 <2.0 E.U./dL    Nitrite, Urine Negative Negative    Leukocyte Esterase, Urine Negative Negative    WBC, UA 0-1 0 - 5 /HPF    RBC, UA 0-1 0 - 2 /HPF    Bacteria, UA NONE SEEN None Seen /HPF   EKG 12 Lead   Result Value Ref Range    Ventricular Rate 111 BPM    Atrial Rate 102 BPM    QRS Duration 88 ms    Q-T Interval 294 ms    QTc Calculation (Bazett) 399 ms    R Axis -34 degrees    T Axis -73 degrees       RADIOLOGY:  XR CHEST PORTABLE   Final Result   1. Right perihilar and right lower lobe pneumonia   2. Cardiomegaly. EKG:  This EKG is signed and interpreted by me. Rate: 111  Rhythm: Atrial fibrillation  Interpretation: EKG obtained demonstrated atrial fibrillation rapid ventricular response, rate 111, left axis deviation, , no new acute ischemic changes. Comparison: changes compared to previous EKG      ------------------------- NURSING NOTES AND VITALS REVIEWED ---------------------------  Date / Time Roomed:  11/2/2022  9:24 AM  ED Bed Assignment:  [id number]    The nursing notes within the ED encounter and vital signs as below have been reviewed. No data found.     Oxygen Saturation Interpretation: Abnormal and Improved after treatment    ------------------------------------------ PROGRESS NOTES ------------------------------------------  Re-evaluation(s):  Time: 1400  Patients symptoms show no change  Repeat physical examination is not changed    Counseling:  I have spoken with the patient and daughter  and discussed todays results, in addition to providing specific details for the plan of care and counseling regarding the diagnosis and prognosis. Their questions are answered at this time and they are agreeable with the plan of admission.    --------------------------------- ADDITIONAL PROVIDER NOTES ---------------------------------  Consultations:  Spoke with TONYA  Discussed case. They will admit the patient. This patient's ED course included: a personal history and physicial examination, re-evaluation prior to disposition, and multiple bedside re-evaluations    This patient has remained hemodynamically stable during their ED course. Diagnosis:  1. Acute respiratory failure with hypoxia ([address]    2. Aspiration pneumonia of right lower lobe, unspecified aspiration pneumonia type ([facility] [address]        Disposition:  Patient's disposition: Admit to telemetry  Patient's condition is stable. Patient was seen and evaluated by myself and my attending No att. providers found. Assessment and Plan discussed with attending provider, please see attestation for final plan of care.      DO Alin Khalil DO  Resident  11/07/22 2564

## 2022-11-03 NOTE — PROGRESS NOTES
Physical Therapy  Facility/Department: [address] INTERNAL MEDICINE 2  Physical Therapy Initial Assessment    Name: Doug Saxena  : 1928  MRN: [id number]  Date of Service: 11/3/2022    Past Medical History:  has a past medical history of A-fib ([facility] Arthritis, Atrial fibrillation ([facility] Blood transfusion, Blood transfusion reaction, CAD (coronary artery disease), Cancer ([facility] CHF (congestive heart failure) ([facility] Chronic back pain, Chronic kidney disease, GERD (gastroesophageal reflux disease), GI bleed, History of blood transfusion, Hyperlipidemia, Hypertension, Pacemaker, Pneumonia, Psychiatric problem, Syncope, and Thyroid disease. Past Surgical History:  has a past surgical history that includes Cardiac pacemaker placement (3/8/11); skin biopsy; ECHO Compl W Dop Color Flow (2012); Coronary artery bypass graft; Coronary angioplasty with stent (); Colonoscopy; Tonsillectomy; Appendectomy; Hysterectomy; Endoscopy, colon, diagnostic; Cardioversion (2012); Cardiac catheterization (2014); Breast surgery; eye surgery; pacemaker placement; Colonoscopy (10/3/2018); and pr lap,surg,colectomy, partial, w/anast (N/A, 10/5/2018). Referring provider:   PRISCILLA Wlils - CNP    PT Order:  PT eval and treat     Evaluating PT:  Gris Hebert PT, DPT PT [id number]    Room #:  8345/5441-H  Diagnosis:  Aspiration pneumonia, unspecified aspiration pneumonia type, unspecified laterality, unspecified part of lung ([facility] [J69.0]  Precautions:  fall risk, O2  Equipment Needs:  none    SUBJECTIVE:    Per chart, pt admitted from nursing facility. Pt unable to report social history. Pt reported she ambulates a but with use of a walker. OBJECTIVE:   Initial Evaluation  Date: 11/3 Treatment Short Term/ Long Term   Goals   Was pt agreeable to Eval/treatment? yes     Does pt have pain? None reported     Bed Mobility  Rolling: Max A  Supine to sit: Max A  Sit to supine: Dep A  Scooting:  Max A to sitting Training to improve static/dynamic balance and to reduce fall risk  [x] Endurance Training to improve activity tolerance during functional mobility   [x] Transfer Training to improve safety and independence with all functional transfers   [x] Gait Training to improve gait mechanics, endurance and assess need for appropriate assistive device  [] Stair Training in preparation for safe discharge home and/or into the community   [x] Positioning to prevent skin breakdown and contractures  [x] Safety and Education Training   [x] Patient/Caregiver Education   [] HEP  [] Other     PT long term treatment goals are located in above grid    Frequency of treatments: 2-5x/week x 1-2 weeks. Time in  0914  Time out  0929    Evaluation Time includes thorough review of current medical information, gathering information on past medical history/social history and prior level of function, completion of standardized testing/informal observation of tasks, assessment of data and education on plan of care and goals.     CPT codes:  [x] Low Complexity PT evaluation 56926  [] Moderate Complexity PT evaluation 82309  [] High Complexity PT evaluation 57752  [] PT Re-evaluation 77662  [] Therapeutic activities 98888 __minutes  [] Therapeutic exercises 70887 __ minutes      Emily Kwok, PT, DPT  PT [id number]

## 2022-11-03 NOTE — H&P
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. CXR displays right perihilar and right lower lobe pneumonia and cardiomegaly. Family did sign a waiver at her facility to allow regular consistency foods and liquids knowing patients history of dysphagia and risk for aspiration. WBC elevated at 20.7, BUN elevated at 35, troponin 54> 51, Pro-BNP 27,560, procalcitonin 0.11. Blood cultures pending. Urinalysis negative. Patient denies any abdominal pain, fever, chills, N/V/D. Patient is admitted to [place] [address] without telemetry further work-up and treatment. Upon initial assessment patient is alert and minimally responsive. Family at the bedside. In no acute distress. Further discussion regarding code status was discussed with daughter Yuan(PHIL) who wants to make her mother comfortable and doesn't want any heroic measures taken for her mother. Code status was then changed to [facility] after discussion with case management as well and referral made to hospice house. REVIEW OF SYSTEMS:  Pertinent negatives are above in HPI. 10 point ROS otherwise negative.       Past Medical History:   Diagnosis Date    A-fib ([facility]     Arthritis     Atrial fibrillation ([facility]     Blood transfusion     Blood transfusion reaction     CAD (coronary artery disease)     Cancer (HCC)     hx skin ca nose    CHF (congestive heart failure) (HCC)     Chronic back pain Chronic kidney disease     UTI    GERD (gastroesophageal reflux disease)     GI bleed     History of blood transfusion     Hyperlipidemia     Hypertension     Pacemaker     Pneumonia 1/5/2020    Psychiatric problem     anxiety    Syncope     recurrent with av block    Thyroid disease          Past Surgical History:   Procedure Laterality Date    APPENDECTOMY      BREAST SURGERY      removed tumor L breast    CAPSULOTOMY, HAND  06/28/2012    Dr Luis F Ponce:  DCCV 1 shock 50 joules= successful    CARDIAC CATHETERIZATION  03/13/2014    CARDIAC PACEMAKER PLACEMENT  3/8/11    COLONOSCOPY      COLONOSCOPY  10/3/2018    COLONOSCOPY WITH BIOPSY performed by Fidencio Glover MD at [address]  2009     2 stents    CORONARY ARTERY BYPASS GRAFT      ECHO COMPL W DOP COLOR FLOW  5/23/2012         ENDOSCOPY, COLON, DIAGNOSTIC      EYE SURGERY      bilat cataracts    HYSTERECTOMY      PACEMAKER PLACEMENT      [facility] 5/2011    MT LAP,SURG,COLECTOMY, PARTIAL, W/ANAST N/A 10/5/2018    LAPAROSCOPIC RIGHT FRANCY COLECTOMY performed by Joana Mcconnell MD at [address] [place] [address]         Medications Prior to Admission:    Medications Prior to Admission: acetaminophen (TYLENOL) 325 MG tablet, Take 650 mg by mouth every 4 hours as needed for Pain or Fever  albuterol-ipratropium (COMBIVENT RESPIMAT)  MCG/ACT AERS inhaler, Inhale 2 puffs into the lungs every 3 hours as needed (HEALTH MAINTENANCE)  D-Mannose 500 MG CAPS, Take 1,000 mg by mouth every morning  bisacodyl (DULCOLAX) 10 MG suppository, Place 10 mg rectally daily as needed for Constipation Indications: GIVE IF NO RESULTS FROM MOM  epoetin anupama (EPOGEN;PROCRIT) 64409 UNIT/ML injection, Inject 20,000 Units into the skin once a week **TUESDAYS** -HOLD FOR HGB >10-  loperamide (IMODIUM) 2 MG capsule, Take 2 mg by mouth every 4 hours as needed for Diarrhea  megestrol (MEGACE) 40 MG/ML suspension, Take 800 mg by mouth daily  methylPREDNISolone (MEDROL) 4 MG tablet, Take 2 mg by mouth every morning  metoprolol tartrate (LOPRESSOR) 25 MG tablet, Take 12.5 mg by mouth 2 times daily Indications: HOLD FOR SBP<100 OR PULSE <60  miconazole (MICOTIN) 2 % powder, Apply topically 2 times daily -AA-  magnesium hydroxide (MILK OF MAGNESIA) 400 MG/5ML suspension, Take by mouth daily as needed for Constipation Indications: GIVE IF NO BM IIN 48HRS & NO RESULTS FROM HIGH FIBER JUICE  morphine sulfate 20 MG/ML concentrated oral solution, Take 5 mg by mouth 4 times daily. promethazine (PHENERGAN) 25 MG tablet, Take 25 mg by mouth every 6 hours as needed (NAUSEA/VOMITING)  budesonide (PULMICORT) 0.5 MG/2ML nebulizer suspension, Take 4 mLs by nebulization 2 times daily  formoterol (PERFOROMIST) 20 MCG/2ML nebulizer solution, Take 2 mLs by nebulization 2 times daily  omeprazole (PRILOSEC) 20 MG delayed release capsule, Take 20 mg by mouth daily  Multiple Vitamins-Minerals (THERAPEUTIC MULTIVITAMIN-MINERALS) tablet, Take 1 tablet by mouth daily  isosorbide mononitrate (IMDUR) 60 MG CR tablet,  Take 60 mg by mouth daily   docusate sodium (COLACE) 100 MG capsule, Take 100 mg by mouth daily as needed for Constipation  levothyroxine (SYNTHROID) 25 MCG tablet, Take 25 mcg by mouth every morning  nitroGLYCERIN (NITROSTAT) 0.4 MG SL tablet, Place 0.4 mg under the tongue every 5 minutes as needed for Chest pain (MAX: 3 DOSES)    Note that the patient's home medications were reviewed and the above list is accurate to the best of my knowledge at the time of the exam.    Allergies:    Latex, Sulfa antibiotics, Adhesive tape, Bactrim, and Pcn [penicillins]    Social History:    reports that she has never smoked. She has never used smokeless tobacco. She reports that she does not drink alcohol and does not use drugs. Family History:   family history includes Cancer in her sister;  Heart Disease in her brother, father, and mother; Stroke in her brother and father. PHYSICAL EXAM:    Vitals:  BP (!) 169/77   Pulse 84   Temp 97.3 °F (36.3 °C) (Oral)   Resp 18   Ht 5' 1\" (1.549 m)   Wt 85 lb 3.2 oz (38.6 kg)   SpO2 100%   BMI 16.10 kg/m²       General appearance: NAD  Eyes: Sclerae anicteric, PERRLA  HEENT: AT/NC, MMM  Neck: FROM, supple, no thyromegaly  Lymph: No cervical / supraclavicular lymphadenopathy  Lungs: Diminished to auscultation, WOB normal  CV: RRR, no MRGs, no lower extremity edema  Abdomen: Soft, non-tender; no masses or HSM, +BS  Extremities: FROM without synovitis. No clubbing or cyanosis of the hands. Skin: no rash, induration, lesions, or ulcers  Psych: Calm and cooperative. Neuro: Alert and minimally responsive, face symmetric    LABS:  All labs reviewed.   Of note:  CBC with Differential:    Lab Results   Component Value Date/Time    WBC 12.7 11/03/2022 04:50 AM    RBC 4.47 11/03/2022 04:50 AM    HGB 14.3 11/03/2022 04:50 AM    HCT 44.1 11/03/2022 04:50 AM     11/03/2022 04:50 AM    MCV 98.7 11/03/2022 04:50 AM    MCH 32.0 11/03/2022 04:50 AM    MCHC 32.4 11/03/2022 04:50 AM    RDW 14.5 11/03/2022 04:50 AM    NRBC 0.0 11/03/2022 04:50 AM    SEGSPCT 59 03/14/2014 03:35 AM    METASPCT 1.0 09/26/2018 08:58 AM    LYMPHOPCT 2.6 11/03/2022 04:50 AM    MONOPCT 4.4 11/03/2022 04:50 AM    BASOPCT 0.0 11/03/2022 04:50 AM    MONOSABS 0.51 11/03/2022 04:50 AM    LYMPHSABS 0.38 11/03/2022 04:50 AM    EOSABS 0.00 11/03/2022 04:50 AM    BASOSABS 0.00 11/03/2022 04:50 AM     BMP:    Lab Results   Component Value Date/Time     11/03/2022 04:50 AM    K 4.1 11/03/2022 04:50 AM     11/03/2022 04:50 AM    CO2 26 11/03/2022 04:50 AM    BUN 35 11/03/2022 04:50 AM    LABALBU 4.0 11/02/2022 09:52 AM    LABALBU 3.6 05/25/2012 04:15 AM    CREATININE 0.7 11/03/2022 04:50 AM    CALCIUM 10.2 11/03/2022 04:50 AM    GFRAA >60 12/22/2021 06:04 AM    LABGLOM >60 11/03/2022 04:50 AM    GLUCOSE 120 11/03/2022 04:50 AM    GLUCOSE 118 05/25/2012 04:15 AM Imaging:  CXR- Right perihilar and right lower lobe pneumonia & cardiomegaly    EKG:  I've personally reviewed the patient's EKG:  Atrial fibrillation with rapid ventricular response  Left axis deviation  Septal infarct , age undetermined  Marked ST abnormality, possible lateral subendocardial injury  Abnormal ECG    Telemetry:  I've personally reviewed the patient's telemetry:  Atrial fibrillation with rapid ventricular response    ASSESSMENT/PLAN:  Principal Problem:    Aspiration pneumonia, unspecified aspiration pneumonia type, unspecified laterality, unspecified part of lung ([facility] [address]  Resolved Problems:    * No resolved hospital problems. 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, CHF, CKD, GERD, GI bleed, hyperlipidemia, hypertension, anxiety, thyroid disease, and pacemaker who presents to the ED for  Pneumonia  -Monitor labs  -WBC 20.7> 12.7  -Pro-BNP 27,560> IV Bumex 0.5 mg BID > discontinued d/t new code status and goals of care   -Procalcitonin 0.11  -Start IV meropenem BID  -Blood cultures pending  -Sputum cultures pending  -Patient currently requiring 3 L oxygen, wean as able    Hypertension  -Monitor BP and HR  -Continue BP meds    Comfort Care  -CODE STATUS changed to DNR-CC  -Morphine, Robinul, Ativan ordered as needed  -Hospice consulted to speak with family members     Medication for other comorbidities continue as appropriate dose adjustment as necessary. DVT prophylaxis Lovenox  PT/OT   Discharge planning [facility] with hospice services v. Hospice House    Case discussed with attending and agreed upon plan of care.      Code status: DNR-CC  Requires Inpatient level of care    PRISCILLA Scott - CNP    10:10 AM  11/3/2022     Above note edited to reflect my thoughts   Patient appears extremely frail and chronically ill on evaluation  Cachectic appearing  CODE STATUS DNR comfort care  Continue with comfort as  Would benefit from hospice house  Okay for discharge if able to go to hospice  Please stop checking blood work  Okay to continue antibiotics until discharge is finalized    I personally saw, examined and provided care for the patient. Radiographs, labs and medication list were reviewed by me independently. The case was discussed in detail and plans for care were established. Review of MARIAH López   , documentation was conducted and revisions were made as appropriate directly by me. I agree with the above documented exam, problem list, and plan of care.      Marcelino Gaines MD  2:42 PM  11/3/2022

## 2022-11-03 NOTE — PROGRESS NOTES
Patient was bladder scanned 499ml was found. April Valeria NP was notified. A rivas was placed per order. 450ml of yellow urine was returned. Patient tolerated well.

## 2022-11-03 NOTE — PROGRESS NOTES
Met with patient's daughter Sadie Mtz. Hospice benefit and philosophy explained. Patient resides at [facility] but currently is having symptoms of shortness of breath and anxiety. Explained to daughter [facility] is  short term and may be a discharge plan. Daughter agrees to [facility] for symptom management. Call to YESENIA Weir NP patient accepted GIP level of care dx Acute on Chronic Respiratory Failure r/t pneumonia.  PAS set up transport 15:00

## 2022-11-03 NOTE — CARE COORDINATION
From [facility] PTA- awaiting return call from liaison to check if pt is a bedhold- no precert required to return. Will need COVID test on discharge. Oriented x 1. Met w/ daughter Odalys Strickland and JEREMIE Lopezr- they are requesting to speak with [facility]- interested in the [place] [address] vs returning to [facility] w/ Hospice - referral made to [facility]-awaiting eval. DNR-CCA. Requiring O2 3lNC. Continues on iv abx. PT/OT evals pending. Will follow Izabella Burr RN case manager    The Plan for Transition of Care is related to the following treatment goals: requesting to speak with [facility]    The Patient and/or patient representative Albert Leyva was provided with a choice of provider and agrees   with the discharge plan. [x] Yes [] No    Freedom of choice list was provided with basic dialogue that supports the patient's individualized plan of care/goals, treatment preferences and shares the quality data associated with the providers. [x] Yes [] No      1330: Accepted at [place] [address]-  time 3pm- transport arranged per [facility].  Carl liaison updated on discharge destination Izabella Burr RN case manager

## 2022-11-03 NOTE — PROGRESS NOTES
Occupational Therapy  OCCUPATIONAL THERAPY INITIAL EVALUATION     [address] [facility]  [facility] [place], [address]        EXTE:75/3/0564                                                  Patient Name: Rodrigo Barbour    MRN: [id number]    : 1928    Room: [address] [place] [address]      Evaluating OT: Naomi Meredith OTR/GERMAN [id number]      Referring Provider: PRISCILLA Ramesh CNP    Specific Provider Orders/Date:OT eval and treat 22      Diagnosis:  Aspiration pneumonia, unspecified aspiration pneumonia type, unspecified laterality, unspecified part of lung ([facility] [J69.0]      Pertinent Medical History: a-fib, arthritis, chronic back pain, CKD, HTN, pacemaker       Precautions:  Fall Risk, alarm, contact, O2     Assessment of current deficits    [x] Functional mobility  [x]ADLs  [x] Strength               [x]Cognition    [x] Functional transfers   [x] IADLs         [x] Safety Awareness   [x]Endurance    [x] Fine Coordination              [x] Balance      [] Vision/perception   []Sensation     []Gross Motor Coordination  [] ROM  [] Delirium                   [] Motor Control     OT PLAN OF CARE   OT POC based on physician orders, patient diagnosis and results of clinical assessment    Frequency/Duration 2-4 days/wk for 2 weeks PRN   Specific OT Treatment Interventions to include:   * Instruction/training on adapted ADL techniques and AE recommendations to increase functional independence within precautions       * Training on energy conservation strategies, correct breathing pattern and techniques to improve independence/tolerance for self-care routine  * Functional transfer/mobility training/DME recommendations for increased independence, safety, and fall prevention  * Patient/Family education to increase follow through with safety techniques and functional independence  * Recommendation of environmental modifications for increased safety with functional transfers/mobility and ADLs  * Cognitive retraining/development of therapeutic activities to improve problem solving, judgement, memory, and attention for increased safety/participation in ADL/IADL tasks  * Therapeutic exercise to improve motor endurance, ROM, and functional strength for ADLs/functional transfers  * Therapeutic activities to facilitate/challenge dynamic balance, stand tolerance for increased safety and independence with ADLs  * Therapeutic activities to facilitate gross/fine motor skills for increased independence with ADLs  * Neuro-muscular re-education: facilitation of righting/equilibrium reactions, midline orientation, scapular stability/mobility, normalization of muscle tone, and facilitation of volitional active controled movement  * Positioning to improve skin integrity, interaction with environment and functional independence  * Delirium prevention/treatment        Recommended Adaptive Equipment: TBD     Home Living: Pt with difficulty reporting PLOF and is a poor historian. Per chart pt admitted from SNF. Pt states that she uses wheeled walker for functional mobility. Pain Level: pt did not report pain this date  Cognition: A&O: self; inconsistently follows 1 step commands. Memory:  Forgetful   Sequencing:  Fair-   Problem solving:  Poor   Judgement/safety:  Poor     Functional Assessment:  AM-PAC Daily Activity Raw Score: 11/24   Initial Eval Status  Date: 11/3/22 Treatment Status  Date: STGs = LTGs  Time frame: 10-14 days   Feeding Min A  Sup   Grooming Mod A, seated  Decreased sitting tolerance/balance and endurance for task completion  SBA   UB Dressing Max A  Min A   LB Dressing Dependent/Max A   Mod A-with use of AD as appropriate/needed   Bathing Max A  Mod A -with use of AD as appropriate/needed   Toileting Dependent  Mod A    Bed Mobility  Supine to sit:  Max A   Sit to supine: Dependent   Min A   Functional Transfers Max A with wheeled walker  Sit<>stand from EOB  Cues for hand placement and safe technique   Min A    Functional Mobility NT. Pt unable. Mod A -with device as needed to maximize independence with ADLs and functional task completion   Balance Sitting:     Static:  SBA    Dynamic:Min A  Standing: Max A with wheeled walker  Sup for static/dynamic sitting balance to maximize independence with ADLs and functional task completion    Min A for standing balance to maximize independence with ADLs and functional task completion   Activity Tolerance Poor with light activity. Pt with decreased strength and endurance noted. Fair- with ADL activity. Visual/  Perceptual Glasses: none at bedside                Additional long-term goal: Pt will increase functional independence to PLOF to allow pt to live in least restrictive environment. Hand Dominance R   AROM (PROM) Strength Additional Info:    RUE  WFL grossly Grossly limited decreased  and FMC/dexterity noted during functional bed mobility     LUE WFL grossly Grossly limited decreased  and FMC/dexterity noted during functional bed mobility     Hearing: WFL   Sensation:  No c/o numbness or tingling   Tone: WFL   Edema: none noted    Comments: Upon arrival patient lying in bed. At end of session, patient returned to bed with call light and phone within reach, all lines and tubes intact. Overall patient demonstrated decreased independence and safety during completion of ADL/functional transfer/mobility tasks. Pt would benefit from continued skilled OT to increase safety and independence with completion of ADL/IADL tasks for functional independence and quality of life. Rehab Potential: Fair for established goals     Patient / Family Goal: none stated    Patient and/or family were instructed on functional diagnosis, prognosis/goals and OT plan of care. Demonstrated poor understanding.      Eval Complexity: Low    Time In: 0913  Time Out: 0928    Min Units   OT Eval Low 97165  x  1   OT Eval Medium 07207      OT Eval High 42734      OT Re-Eval [id number]       Therapeutic Ex [id number]       Therapeutic Activities 45248       ADL/Self Care 25418       Orthotic Management 90825       Manual 58416     Neuro Re-Ed 95751       Non-Billable Time          Evaluation Time additionally includes thorough review of current medical information, gathering information on past medical history/social history and prior level of function, interpretation of standardized testing/informal observation of tasks, assessment of data and development of plan of care and goals.             Constance Lefort, OTR/L, [id number]

## 2022-11-07 LAB
BLOOD CULTURE, ROUTINE: NORMAL
CULTURE, BLOOD 2: NORMAL

## 2023-05-12 NOTE — PLAN OF CARE
Problem: Falls - Risk of:  Goal: Absence of physical injury  Absence of physical injury   Outcome: Met This Shift      Problem: Bleeding:  Goal: Knowledge of bleeding precautions  Outcome: Met This Shift      Problem: Hemodynamic Status  Goal: Hemoglobin within specified parameters  Outcome: Met This Shift      Problem: Pain:  Goal: Control of chronic pain  Control of chronic pain   Outcome: Met This Shift
Problem: Falls - Risk of:  Goal: Will remain free from falls  Will remain free from falls   Outcome: Met This Shift
Problem: Falls - Risk of:  Goal: Will remain free from falls  Will remain free from falls   Outcome: Met This Shift
Problem: Falls - Risk of:  Goal: Will remain free from falls  Will remain free from falls   Outcome: Met This Shift      Problem: Risk for Impaired Skin Integrity  Goal: Tissue integrity - skin and mucous membranes  Structural intactness and normal physiological function of skin and  mucous membranes.    Outcome: Met This Shift
Problem: Falls - Risk of:  Goal: Will remain free from falls  Will remain free from falls   Outcome: Met This Shift      Problem: Risk for Impaired Skin Integrity  Goal: Tissue integrity - skin and mucous membranes  Structural intactness and normal physiological function of skin and  mucous membranes.    Outcome: Met This Shift
Problem: Falls - Risk of:  Goal: Will remain free from falls  Will remain free from falls   Outcome: Met This Shift      Problem: Risk for Impaired Skin Integrity  Goal: Tissue integrity - skin and mucous membranes  Structural intactness and normal physiological function of skin and  mucous membranes.    Outcome: Met This Shift      Problem: Pain:  Goal: Control of chronic pain  Control of chronic pain   Outcome: Met This Shift
Problem: Falls - Risk of:  Goal: Will remain free from falls  Will remain free from falls   Outcome: Met This Shift    Goal: Absence of physical injury  Absence of physical injury   Outcome: Met This Shift      Problem: Pain:  Goal: Control of acute pain  Control of acute pain   Outcome: Met This Shift    Goal: Control of chronic pain  Control of chronic pain   Outcome: Met This Shift
Problem: Falls - Risk of:  Goal: Will remain free from falls  Will remain free from falls   Outcome: Met This Shift    Goal: Absence of physical injury  Absence of physical injury   Outcome: Met This Shift      Problem: Pain:  Goal: Pain level will decrease  Pain level will decrease   Outcome: Met This Shift    Goal: Control of acute pain  Control of acute pain   Outcome: Met This Shift    Goal: Control of chronic pain  Control of chronic pain   Outcome: Met This Shift
Problem: Pain:  Goal: Pain level will decrease  Pain level will decrease   Outcome: Met This Shift  Medicated as ordered
none

## (undated) DEVICE — INSUFFLATION TUBING SET WITH FILTER, FUNNEL CONNECTOR AND LUER LOCK: Brand: JOSNOE MEDICAL INC

## (undated) DEVICE — 4-PORT MANIFOLD: Brand: NEPTUNE 2

## (undated) DEVICE — KIT,ANTI FOG,W/SPONGE & FLUID,SOFT PACK: Brand: MEDLINE

## (undated) DEVICE — SKIN AFFIX SURG ADHESIVE 72/CS 0.55ML: Brand: MEDLINE

## (undated) DEVICE — SCISSORS ENDOSCP DIA5MM CRV MPLR CAUT W/ RATCH HNDL

## (undated) DEVICE — GOWN,SIRUS,FABRNF,XL,20/CS: Brand: MEDLINE

## (undated) DEVICE — TROCAR ENDOSCP L100MM DIA5MM BLDELSS STBL SL THRD OPT VW

## (undated) DEVICE — SEALER TISS L20CM DIA13MM ADV BPLR L CRV JAW OPN APPRCH

## (undated) DEVICE — SHEET, T, LAPAROTOMY, STERILE: Brand: MEDLINE

## (undated) DEVICE — ACCESS PLATFORM FOR MINIMALLY INVASIVE SURGERY.: Brand: GELPORT® LAPAROSCOPIC  SYSTEM

## (undated) DEVICE — LAPAROSCOPIC EXTRAS W/ BIPOLAR

## (undated) DEVICE — YANKAUER,POOLE TIP,STERILE,50/CS: Brand: MEDLINE

## (undated) DEVICE — RELOAD STPL L75MM OPN H3.8MM CLS 1.5MM WIRE DIA0.2MM REG

## (undated) DEVICE — PACK PROCEDURE SURG GEN CUST

## (undated) DEVICE — Z INACTIVE USE 2660664 SOLUTION IRRIG 3000ML 0.9% SOD CHL USP UROMATIC PLAS CONT

## (undated) DEVICE — Device

## (undated) DEVICE — PUMP SUC IRR TBNG L10FT W/ HNDPC ASSEMB STRYKEFLOW 2

## (undated) DEVICE — SEALER TISS L45CM ADV BPLR STR TIP LAP APPRCH ENSEAL G2

## (undated) DEVICE — TOWEL,OR,DSP,ST,BLUE,STD,6/PK,12PK/CS: Brand: MEDLINE

## (undated) DEVICE — DOUBLE BASIN SET: Brand: MEDLINE INDUSTRIES, INC.

## (undated) DEVICE — PATIENT RETURN ELECTRODE, SINGLE-USE, CONTACT QUALITY MONITORING, ADULT, WITH 9FT CORD, FOR PATIENTS WEIGING OVER 33LBS. (15KG): Brand: MEGADYNE

## (undated) DEVICE — NEEDLE INSUF L120MM DIA2MM DISP FOR PNEUMOPERI ENDOPATH

## (undated) DEVICE — CHLORAPREP 26ML ORANGE

## (undated) DEVICE — GRADUATE TRIANG MEASURE 1000ML BLK PRNT

## (undated) DEVICE — INSTRUMENT POUCH: Brand: DEROYAL

## (undated) DEVICE — STAPLER INT L60MM REG TISS BLU B FRM 8 FIRING 2 ROW AUTO

## (undated) DEVICE — TOWEL,OR,DSP,ST,GREEN,DLX,XR,4/PK,20PK/C: Brand: MEDLINE

## (undated) DEVICE — STAPLER INT L75MM CUT LN L73MM STPL LN L77MM BLU B FRM 8

## (undated) DEVICE — TOTAL TRAY, 16FR 10ML SIL FOLEY, URN: Brand: MEDLINE

## (undated) DEVICE — ELECTRODE ES 36CM LAP FLAT L HK COAT DISP CLEANCOAT

## (undated) DEVICE — SOLUTION IV IRRIG WATER 1000ML POUR BRL 2F7114

## (undated) DEVICE — TROCAR ENDOSCP L100MM DIA5MM BLDELSS STBL SL OBT RADLUC

## (undated) DEVICE — SOLUTION IV IRRIG POUR BRL 0.9% SODIUM CHL 2F7124

## (undated) DEVICE — INTENDED FOR TISSUE SEPARATION, AND OTHER PROCEDURES THAT REQUIRE A SHARP SURGICAL BLADE TO PUNCTURE OR CUT.: Brand: BARD-PARKER ® STAINLESS STEEL BLADES

## (undated) DEVICE — PLUMEPORT LAPAROSCOPIC SMOKE FILTRATION DEVICE: Brand: PLUMEPORT ACTIV

## (undated) DEVICE — COVER HNDL LT DISP

## (undated) DEVICE — TROCAR ENDOSCP L100MM DIA5MM DIL TIP STBL SL W OPTVW

## (undated) DEVICE — FORCEPS BX L240CM JAW DIA2.4MM ORNG L CAP W/ NDL DISP RAD